# Patient Record
Sex: FEMALE | Race: WHITE | NOT HISPANIC OR LATINO | Employment: OTHER | URBAN - METROPOLITAN AREA
[De-identification: names, ages, dates, MRNs, and addresses within clinical notes are randomized per-mention and may not be internally consistent; named-entity substitution may affect disease eponyms.]

---

## 2017-03-10 ENCOUNTER — TELEPHONE (OUTPATIENT)
Dept: VASCULAR SURGERY | Facility: CLINIC | Age: 27
End: 2017-03-10

## 2017-03-10 DIAGNOSIS — I87.2 VENOUS INSUFFICIENCY: Primary | ICD-10-CM

## 2017-03-15 ENCOUNTER — HOSPITAL ENCOUNTER (EMERGENCY)
Facility: OTHER | Age: 27
Discharge: HOME | End: 2017-03-15
Attending: EMERGENCY MEDICINE
Payer: MEDICARE

## 2017-03-15 VITALS
TEMPERATURE: 99 F | SYSTOLIC BLOOD PRESSURE: 150 MMHG | HEIGHT: 63 IN | HEART RATE: 96 BPM | DIASTOLIC BLOOD PRESSURE: 60 MMHG | BODY MASS INDEX: 22.68 KG/M2 | OXYGEN SATURATION: 99 % | WEIGHT: 128 LBS | RESPIRATION RATE: 18 BRPM

## 2017-03-15 DIAGNOSIS — R10.9 CHRONIC ABDOMINAL PAIN: ICD-10-CM

## 2017-03-15 DIAGNOSIS — R10.10 PAIN OF UPPER ABDOMEN: Primary | ICD-10-CM

## 2017-03-15 DIAGNOSIS — R11.2 NON-INTRACTABLE VOMITING WITH NAUSEA, UNSPECIFIED VOMITING TYPE: ICD-10-CM

## 2017-03-15 DIAGNOSIS — G89.29 CHRONIC ABDOMINAL PAIN: ICD-10-CM

## 2017-03-15 LAB
ALBUMIN SERPL BCP-MCNC: 4.1 G/DL
ALP SERPL-CCNC: 44 U/L
ALT SERPL W/O P-5'-P-CCNC: 14 U/L
ANION GAP SERPL CALC-SCNC: 7 MMOL/L
AST SERPL-CCNC: 19 U/L
B-HCG UR QL: NEGATIVE
BASOPHILS # BLD AUTO: 0.01 K/UL
BASOPHILS NFR BLD: 0.1 %
BILIRUB SERPL-MCNC: 0.9 MG/DL
BUN SERPL-MCNC: 4 MG/DL
CALCIUM SERPL-MCNC: 9.5 MG/DL
CHLORIDE SERPL-SCNC: 106 MMOL/L
CO2 SERPL-SCNC: 26 MMOL/L
CREAT SERPL-MCNC: 0.9 MG/DL
CTP QC/QA: YES
DIFFERENTIAL METHOD: ABNORMAL
EOSINOPHIL # BLD AUTO: 0.1 K/UL
EOSINOPHIL NFR BLD: 0.8 %
ERYTHROCYTE [DISTWIDTH] IN BLOOD BY AUTOMATED COUNT: 11.6 %
EST. GFR  (AFRICAN AMERICAN): >60 ML/MIN/1.73 M^2
EST. GFR  (NON AFRICAN AMERICAN): >60 ML/MIN/1.73 M^2
GLUCOSE SERPL-MCNC: 83 MG/DL
HCT VFR BLD AUTO: 42.7 %
HGB BLD-MCNC: 14.9 G/DL
INR PPP: 1
LIPASE SERPL-CCNC: 7 U/L
LYMPHOCYTES # BLD AUTO: 2.8 K/UL
LYMPHOCYTES NFR BLD: 27.4 %
MAGNESIUM SERPL-MCNC: 2.2 MG/DL
MCH RBC QN AUTO: 31.5 PG
MCHC RBC AUTO-ENTMCNC: 34.9 %
MCV RBC AUTO: 90 FL
MONOCYTES # BLD AUTO: 0.7 K/UL
MONOCYTES NFR BLD: 6.4 %
NEUTROPHILS # BLD AUTO: 6.7 K/UL
NEUTROPHILS NFR BLD: 65.1 %
PLATELET # BLD AUTO: 232 K/UL
PMV BLD AUTO: 9.9 FL
POTASSIUM SERPL-SCNC: 4.3 MMOL/L
PROT SERPL-MCNC: 7.7 G/DL
PROTHROMBIN TIME: 10.7 SEC
RBC # BLD AUTO: 4.73 M/UL
SODIUM SERPL-SCNC: 139 MMOL/L
WBC # BLD AUTO: 10.25 K/UL

## 2017-03-15 PROCEDURE — 85025 COMPLETE CBC W/AUTO DIFF WBC: CPT

## 2017-03-15 PROCEDURE — 25000003 PHARM REV CODE 250: Performed by: EMERGENCY MEDICINE

## 2017-03-15 PROCEDURE — 96376 TX/PRO/DX INJ SAME DRUG ADON: CPT

## 2017-03-15 PROCEDURE — 80053 COMPREHEN METABOLIC PANEL: CPT

## 2017-03-15 PROCEDURE — 83690 ASSAY OF LIPASE: CPT

## 2017-03-15 PROCEDURE — 81025 URINE PREGNANCY TEST: CPT | Performed by: EMERGENCY MEDICINE

## 2017-03-15 PROCEDURE — 83735 ASSAY OF MAGNESIUM: CPT

## 2017-03-15 PROCEDURE — 96375 TX/PRO/DX INJ NEW DRUG ADDON: CPT

## 2017-03-15 PROCEDURE — 96365 THER/PROPH/DIAG IV INF INIT: CPT

## 2017-03-15 PROCEDURE — 99284 EMERGENCY DEPT VISIT MOD MDM: CPT | Mod: 25

## 2017-03-15 PROCEDURE — 63600175 PHARM REV CODE 636 W HCPCS: Performed by: EMERGENCY MEDICINE

## 2017-03-15 PROCEDURE — 96361 HYDRATE IV INFUSION ADD-ON: CPT

## 2017-03-15 PROCEDURE — 85610 PROTHROMBIN TIME: CPT

## 2017-03-15 RX ORDER — AMOXICILLIN AND CLAVULANATE POTASSIUM 875; 125 MG/1; MG/1
1 TABLET, FILM COATED ORAL EVERY 12 HOURS
Qty: 14 TABLET | Refills: 0 | Status: SHIPPED | OUTPATIENT
Start: 2017-03-15 | End: 2017-04-19 | Stop reason: ALTCHOICE

## 2017-03-15 RX ORDER — PROMETHAZINE HYDROCHLORIDE 25 MG/1
25 TABLET ORAL 3 TIMES DAILY PRN
Qty: 20 TABLET | Refills: 0 | Status: SHIPPED | OUTPATIENT
Start: 2017-03-15 | End: 2017-04-19 | Stop reason: ALTCHOICE

## 2017-03-15 RX ORDER — PROMETHAZINE HYDROCHLORIDE 25 MG/1
25 SUPPOSITORY RECTAL EVERY 4 HOURS PRN
COMMUNITY
End: 2017-03-15

## 2017-03-15 RX ORDER — AMOXICILLIN AND CLAVULANATE POTASSIUM 875; 125 MG/1; MG/1
1 TABLET, FILM COATED ORAL
Status: DISCONTINUED | OUTPATIENT
Start: 2017-03-15 | End: 2017-03-15 | Stop reason: HOSPADM

## 2017-03-15 RX ORDER — OXYCODONE AND ACETAMINOPHEN 10; 325 MG/1; MG/1
1 TABLET ORAL EVERY 6 HOURS PRN
COMMUNITY
End: 2017-04-19 | Stop reason: ALTCHOICE

## 2017-03-15 RX ORDER — METRONIDAZOLE 500 MG/1
500 TABLET ORAL EVERY 12 HOURS
COMMUNITY
End: 2017-04-19 | Stop reason: ALTCHOICE

## 2017-03-15 RX ORDER — SULFAMETHOXAZOLE AND TRIMETHOPRIM 800; 160 MG/1; MG/1
1 TABLET ORAL 2 TIMES DAILY
COMMUNITY
End: 2017-04-19 | Stop reason: ALTCHOICE

## 2017-03-15 RX ORDER — PROMETHAZINE HYDROCHLORIDE 25 MG/1
25 TABLET ORAL 3 TIMES DAILY PRN
COMMUNITY
End: 2017-03-15

## 2017-03-15 RX ORDER — MORPHINE SULFATE 2 MG/ML
4 INJECTION, SOLUTION INTRAMUSCULAR; INTRAVENOUS
Status: COMPLETED | OUTPATIENT
Start: 2017-03-15 | End: 2017-03-15

## 2017-03-15 RX ADMIN — MORPHINE SULFATE 4 MG: 2 INJECTION, SOLUTION INTRAMUSCULAR; INTRAVENOUS at 06:03

## 2017-03-15 RX ADMIN — PROMETHAZINE HYDROCHLORIDE 12.5 MG: 25 INJECTION INTRAMUSCULAR; INTRAVENOUS at 06:03

## 2017-03-15 RX ADMIN — PROMETHAZINE HYDROCHLORIDE 12.5 MG: 25 INJECTION INTRAMUSCULAR; INTRAVENOUS at 08:03

## 2017-03-15 RX ADMIN — SODIUM CHLORIDE 1000 ML: 0.9 INJECTION, SOLUTION INTRAVENOUS at 06:03

## 2017-03-15 NOTE — ED PROVIDER NOTES
"Encounter Date: 3/15/2017       History     Chief Complaint   Patient presents with    Abdominal Pain     Pt CO abd pain with diarrhea, and vomiting Xs 3 weeks.     Review of patient's allergies indicates:   Allergen Reactions    Ciprofloxacin hcl     Flagyl [metronidazole hcl]     Hydrocodone-acetaminophen      Bad migraines; dizzy, nausea    Metoclopramide      Other reaction(s): Rash, Unspecified    Peanut     Pylera [bismuth subcit [id number]] Nausea And Vomiting and Other (See Comments)     Pt reports dizziness with Pylera    Toradol [ketorolac]     Zofran [ondansetron hcl (pf)] Other (See Comments)     Pt reports migraine with Zofran    Adhesive Rash     Plastic tape    Avelox [moxifloxacin] Nausea And Vomiting    Demerol [meperidine] Nausea And Vomiting    Neurontin [gabapentin] Nausea And Vomiting    Ultram [tramadol] Nausea And Vomiting     HPI Comments: 26-year-old female with history of chronic abdominal pain, headaches, history of bowel obstruction, ovarian cyst, ulcer with H. pylori interstitial cystitis presents emergency department with complaints of right upper abdominal pain.  She reports associated nausea, vomiting and diarrhea.  She states her symptoms been hasn't for the last 3 weeks.  She complains of pain that is a 9 out of 10.  She reports subjective fever and chills.  She admits that she's been seen multiple times for these complaints since onset.  She states that she was seen at [facility] where she had urine, labs and CT obtained.  She reports that they initially saw concerns for "leaking in my intestines" so a CT was repeated one hour later with no acute findings.  She states that she was then discharged home.  She states that she returned on Sunday for the same complaints any repeat CT was obtained.  She states that she was told that she had colitis in her small intestine and was treated with Flagyl and Cipro as well as Percocet and Phenergan.  She states " "that when she went to fill the prescriptions, the pharmacist would not fill them due to ALLERGIES to Avelox and Pylera.  She states that she was told clear liquid diet and pain medications.  She states that she followed up with gastroenterologist, Dr. Hodge today.  She states that he wanted to do a scope on her however she "did not like the way he spoke to me."  She states that he was asking her "personal questions but didn't apply."  She admits that she treated with Percocet earlier today with no relief.  She admits that she has a history of cholecystectomy.    The history is provided by the patient and a parent.     Past Medical History:   Diagnosis Date    FHx: bowel obstruction     HA (headache)     Interstitial cystitis     Ovarian cyst     Ulcer      Past Surgical History:   Procedure Laterality Date    BACK SURGERY  2010    had the scar from this surgery fixed recently    CHOLECYSTECTOMY N/A 05/2015    COLON SURGERY  2012    bowel obstruction    MINI-LAPAROTOMY      NOSE SURGERY      RHINOPLASTY TIP      tracheal polyp removed       Family History   Problem Relation Age of Onset    Diabetes Mother     Stroke Mother      51 yo    Diabetes Father     Hypertension Father     GI problems Father     Hyperlipidemia Father     Diabetes Maternal Aunt     Diabetes Maternal Uncle     Diabetes Maternal Grandmother     Diabetes Maternal Grandfather     Cancer Paternal Grandmother      bladder    Colon cancer Paternal Grandfather     Breast cancer Neg Hx     Heart disease Neg Hx      Social History   Substance Use Topics    Smoking status: Never Smoker    Smokeless tobacco: Never Used    Alcohol use No     Review of Systems   Constitutional: Positive for chills and fever.   HENT: Negative for sore throat.    Respiratory: Negative for shortness of breath.    Cardiovascular: Negative for chest pain.   Gastrointestinal: Positive for abdominal pain, blood in stool, diarrhea, nausea and vomiting. "   Genitourinary: Negative for dysuria, flank pain, frequency, hematuria, urgency and vaginal bleeding.   Musculoskeletal: Negative for back pain.   Skin: Negative for rash.   Neurological: Negative for weakness.   Hematological: Does not bruise/bleed easily.       Physical Exam   Initial Vitals   BP Pulse Resp Temp SpO2   03/15/17 1647 03/15/17 1647 03/15/17 1647 03/15/17 1647 03/15/17 1647   120/95 12 18 98.5 °F (36.9 °C) 100 %     Physical Exam    Nursing note and vitals reviewed.  Constitutional: Vital signs are normal. She appears well-developed and well-nourished. She is not diaphoretic.  Non-toxic appearance. No distress.   Tearful on exam   HENT:   Head: Normocephalic and atraumatic.   Right Ear: External ear normal.   Left Ear: External ear normal.   Nose: Nose normal.   Mouth/Throat: Uvula is midline, oropharynx is clear and moist and mucous membranes are normal. Mucous membranes are not dry. No trismus in the jaw. No uvula swelling. No oropharyngeal exudate, posterior oropharyngeal edema, posterior oropharyngeal erythema or tonsillar abscesses.   Eyes: Conjunctivae, EOM and lids are normal. Pupils are equal, round, and reactive to light. No scleral icterus.   Neck: Normal range of motion and phonation normal. Neck supple.   Cardiovascular: Normal rate, regular rhythm and normal heart sounds. Exam reveals no gallop and no friction rub.    No murmur heard.  Pulmonary/Chest: Effort normal and breath sounds normal. No respiratory distress. She has no decreased breath sounds. She has no wheezes. She has no rhonchi. She has no rales.   Abdominal: Soft. Normal appearance and bowel sounds are normal. There is no tenderness. There is no rigidity, no rebound, no guarding, no CVA tenderness, no tenderness at McBurney's point and negative Serrano's sign.   Musculoskeletal: Normal range of motion.   No obvious deformities, moving all extremities, normal gait   Neurological: She is alert and oriented to person, place,  and time. She has normal strength and normal reflexes. No sensory deficit.   Skin: Skin is warm, dry and intact. No lesion and no rash noted. No erythema.   Psychiatric: She has a normal mood and affect. Her speech is normal and behavior is normal. Judgment normal. Cognition and memory are normal.         ED Course   Procedures  Labs Reviewed   CBC W/ AUTO DIFFERENTIAL - Abnormal; Notable for the following:        Result Value    MCH 31.5 (*)     All other components within normal limits   COMPREHENSIVE METABOLIC PANEL - Abnormal; Notable for the following:     BUN, Bld 4 (*)     Alkaline Phosphatase 44 (*)     Anion Gap 7 (*)     All other components within normal limits   MAGNESIUM   LIPASE   PROTIME-INR   POCT URINE PREGNANCY             Medical Decision Making:   History:   I obtained history from: someone other than patient.       <> Summary of History: mother  Old Medical Records: I decided to obtain old medical records.  Old Records Summarized: other records.  Initial Assessment:   26-year-old female with complaints consistent with acute on chronic abdominal pain with associated nausea and vomiting.  Vital signs stable, afebrile, neurovascular intact.  She is alert, healthy and nontoxic appearing.  Abdomen is nontender and soft.  Patient is tearful on exam.  No active emesis while in the emergency department.  Clinical Tests:   Lab Tests: Reviewed and Ordered  The following lab test(s) were unremarkable: UPT, CBC, CMP and Lipase  ED Management:  I do believe that this is acute on chronic pain.  I do not feel that workup is indicated as patient has had multiple recent CT exams, hospital visits as well as an evaluation by gastroenterology today. I discussed with the attending physician.  I do not feel that narcotics are indicated at this time based on patient's history and exam.  She is requesting Dilaudid by name.  He does feel that analgesic is going to be required in order to be able to discharge patient.   As the attending physician.  No acute findings on blood work.  Bleeding and pregnancy.  She was administered IV fluids, Phenergan and morphine in the emergency department.  She refused oral Augmentin in the emergency department secondary to being  nauseous. 8:36 PM patient requesting discharge.  She was discharged home with Phenergan and Augmentin and is urged to follow-up with your GI for outpatient scope.    Other:   I have discussed this case with another health care provider.       <> Summary of the Discussion: Yesika  This note was created using Dragon Medical dictation.  There may be typographical errors secondary to dictation.                Attending Attestation:     Physician Attestation Statement for NP/PA:   I have conducted a face to face encounter with this patient in addition to the NP/PA, due to Medical Complexity    Other NP/PA Attestation Additions:      Medical Decision Making:     Complex GI history, 26F with h/o recurrent upper abdominal pain and reported CT at  dx as 'small bowel infection', with persistent pain and nausea, unable to fill Cipro/Flagyl due to reported Levaquin allergy. Likely not true allergy but side effects. Per chart review she had very similar presentation last year and saw GI at [place], but she states this is different. She needs EGD and colonoscopy, and saw GI today but 'did not like his bedside manner'.  Labs checked and normal, no indication for repeat CT. Pain controlled and no emesis in ED. Given reported bowel infection and allergy, advised change Abx to Augmentin. Pt did not want to take dose in ED on empty stomach, will start it tomorrow and f/u with another GI, as scopes are needed for definitive dx of any inflammatory bowel dz.                 ED Course     Clinical Impression:     1. Pain of upper abdomen    2. Chronic abdominal pain    3. Non-intractable vomiting with nausea, unspecified vomiting type          Disposition:   Disposition:  Discharged  Condition: Stable       Nai Drummond PA-C  03/15/17 203       Justin Napoles MD  03/16/17 5804

## 2017-03-15 NOTE — ED AVS SNAPSHOT
[facility]  [address]  [facility] [address]               Kendra Russell   3/15/2017  5:47 PM   ED    Description:  Female : 1990   Department:  [facility]           Your Care was Coordinated By:     Provider Role From To    Justin Napoles MD Attending Provider 03/15/17 974 --    Nai Drummond PA-C Physician Assistant 03/15/17 1749 --      Reason for Visit     Abdominal Pain           Diagnoses this Visit        Comments    Pain of upper abdomen    -  Primary       ED Disposition     None           To Do List           Follow-up Information     Follow up with Roldan De Jesus MD. Schedule an appointment as soon as possible for a visit in 3 days.    Specialty:  Gastroenterology    Contact information:    [address] [facility] [address]  [place] [id number] [phone]         These Medications        Disp Refills Start End    promethazine (PHENERGAN) 25 MG tablet 20 tablet 0 3/15/2017     Take 1 tablet (25 mg total) by mouth 3 (three) times daily as needed for Nausea. - Oral    Pharmacy: [facility] [address] [place] [address] [address] [facility] Ph #: [phone]       amoxicillin-clavulanate 875-125mg (AUGMENTIN) 875-125 mg per tablet 14 tablet 0 3/15/2017     Take 1 tablet by mouth every 12 (twelve) hours. - Oral    Pharmacy: [facility] [address] [place] [address] [address] AT [facility] Ph #: [phone]         [facility]     [facility] Care [facility] -  Assistance  Registered nurses in the [facility] provide clinical advisement, health education, appointment booking, and other advisory services.  Call for this free service at [phone].             Medications           Message regarding Medications     Verify the changes and/or additions to your medication regime listed below are the same as discussed with your clinician  today.  If any of these changes or additions are incorrect, please notify your healthcare provider.        START taking these NEW medications        Refills    promethazine (PHENERGAN) 25 MG tablet 0    Sig: Take 1 tablet (25 mg total) by mouth 3 (three) times daily as needed for Nausea.    Class: Print    Route: Oral    amoxicillin-clavulanate 875-125mg (AUGMENTIN) 875-125 mg per tablet 0    Sig: Take 1 tablet by mouth every 12 (twelve) hours.    Class: Print    Route: Oral      These medications were administered today        Dose Freq    sodium chloride 0.9% bolus 1,000 mL 1,000 mL ED 1 Time    Sig: Inject 1,000 mLs into the vein ED 1 Time.    Class: Normal    Route: Intravenous    morphine injection 4 mg 4 mg ED 1 Time    Sig: Inject 2 mLs (4 mg total) into the vein ED 1 Time.    Class: Normal    Route: Intravenous    promethazine (PHENERGAN) 12.5 mg in dextrose 5 % 50 mL IVPB 12.5 mg ED 1 Time    Sig: Inject 12.5 mg into the vein ED 1 Time.    Class: Normal    Route: Intravenous    promethazine (PHENERGAN) 12.5 mg in dextrose 5 % 50 mL IVPB 12.5 mg ED 1 Time    Sig: Inject 12.5 mg into the vein ED 1 Time.    Class: Normal    Route: Intravenous    amoxicillin-clavulanate 875-125mg per tablet 1 tablet 1 tablet ED 1 Time    Sig: Take 1 tablet by mouth ED 1 Time.    Class: Normal    Route: Oral      STOP taking these medications     oxycodone-acetaminophen (PERCOCET) 5-325 mg per tablet Take 1 tablet by mouth every 6 (six) hours as needed for Pain (severe pain).    promethazine (PHENERGAN) 25 MG suppository Place 25 mg rectally every 4 (four) hours as needed for Nausea.           Verify that the below list of medications is an accurate representation of the medications you are currently taking.  If none reported, the list may be blank. If incorrect, please contact your healthcare provider. Carry this list with you in case of emergency.           Current Medications     amoxicillin-clavulanate 875-125mg (AUGMENTIN)  "875-125 mg per tablet Take 1 tablet by mouth every 12 (twelve) hours.    amoxicillin-clavulanate 875-125mg per tablet 1 tablet Take 1 tablet by mouth ED 1 Time.    docusate sodium (COLACE) 100 MG capsule Take 100 mg by mouth.    etonogestrel-ethinyl estradiol (NUVARING) 0.12-0.015 mg/24 hr vaginal ring Place vaginally.    metronidazole (FLAGYL) 500 MG tablet Take 500 mg by mouth every 12 (twelve) hours.    oxycodone-acetaminophen (PERCOCET)  mg per tablet Take 1 tablet by mouth every 6 (six) hours as needed for Pain.    promethazine (PHENERGAN) 12.5 mg in dextrose 5 % 50 mL IVPB Inject 12.5 mg into the vein ED 1 Time.    promethazine (PHENERGAN) 25 MG tablet Take 1 tablet (25 mg total) by mouth 3 (three) times daily as needed for Nausea.    sulfamethoxazole-trimethoprim 800-160mg (BACTRIM DS) 800-160 mg Tab Take 1 tablet by mouth 2 (two) times daily.           Clinical Reference Information           Your Vitals Were     BP Pulse Temp Resp Height Weight    120/95 (BP Location: Left arm, Patient Position: Sitting) 12 98.5 °F (36.9 °C) (Oral) 18 5' 3" (1.6 m) 58.1 kg (128 lb)    SpO2 BMI             100% 22.67 kg/m2         Allergies as of 3/15/2017        Reactions    Ciprofloxacin Hcl     Flagyl [Metronidazole Hcl]     Hydrocodone-acetaminophen     Bad migraines; dizzy, nausea    Metoclopramide     Other reaction(s): Rash, Unspecified    Peanut     Pylera [Bismuth Subcit [id number]] Nausea And Vomiting, Other (See Comments)    Pt reports dizziness with Pylera    Toradol [Ketorolac]     Zofran [Ondansetron Hcl (Pf)] Other (See Comments)    Pt reports migraine with Zofran    Adhesive Rash    Plastic tape    Avelox [Moxifloxacin] Nausea And Vomiting    Demerol [Meperidine] Nausea And Vomiting    Neurontin [Gabapentin] Nausea And Vomiting    Ultram [Tramadol] Nausea And Vomiting      Immunizations Administered on Date of Encounter - 3/15/2017     None      ED Micro, Lab, POCT     Start Ordered       Status " Ordering Provider    03/15/17 1822 03/15/17 1822  CBC auto differential  STAT      Final result     03/15/17 1822 03/15/17 1822  Comprehensive metabolic panel  STAT      Final result     03/15/17 1822 03/15/17 1822  Magnesium  STAT      Final result     03/15/17 1822 03/15/17 1822  Lipase  STAT      Final result     03/15/17 1822 03/15/17 1822  Protime-INR  STAT      Final result     03/15/17 1650 03/15/17 1649  POCT urine pregnancy  Once      Final result       ED Imaging Orders     None        Discharge Instructions         Abdominal Pain    Abdominal pain is pain in the stomach or belly area. Everyone has this pain from time to time. In many cases it goes away on its own. But abdominal pain can sometimes be due to a serious problem, such as appendicitis. So its important to know when to seek help.  Causes of abdominal pain  There are many possible causes of abdominal pain. Common causes in adults include:  · Constipation, diarrhea, or gas  · Stomach acid flowing back up into the esophagus (acid reflux or heartburn)  · Severe acid reflux, called GERD (gastroesophageal reflux disease)  · A sore in the lining of the stomach or small intestine (peptic ulcer)  · Inflammation of the gallbladder, liver, or pancreas  · Gallstones or kidney stones  · Appendicitis   · Intestinal blockage   · An internal organ pushing through a muscle or other tissue (hernia)  · Urinary tract infections  · In women, menstrual cramps, fibroids, or endometriosis  · Inflammation or infection of the intestines  Diagnosing the cause of abdominal pain  Your healthcare provider will do a physical exam help find the cause of your pain. If needed, tests will be ordered. Belly pain has many possible causes. So it can be hard to find the reason for your pain. Giving details about your pain can help. Tell your provider where and when you feel the pain, and what makes it better or worse. Also let your provider know if you have other symptoms such  as:  · Fever  · Tiredness  · Upset stomach (nausea)  · Vomiting  · Changes in bathroom habits  Treating abdominal pain  Some causes of pain need emergency medical treatment right away. These include appendicitis or a bowel blockage. Other problems can be treated with rest, fluids, or medicines. Your healthcare provider can give you specific instructions for treatment or self-care based on what is causing your pain.  If you have vomiting or diarrhea, sip water or other clear fluids. When you are ready to eat solid foods again, start with small amounts of easy-to-digest, low-fat foods. These include apple sauce, toast, or crackers.   When to seek medical care  Call 911 or go to the hospital right away if you:  · Cant pass stool and are vomiting  · Are vomiting blood or have bloody diarrhea or black, tarry diarrhea  · Have chest, neck, or shoulder pain  · Feel like you might pass out  · Have pain in your shoulder blades with nausea  · Have sudden, severe belly pain  · Have new, severe pain unlike any you have felt before  · Have a belly that is rigid, hard, and tender to touch  Call your healthcare provider if you have:  · Pain for more than 5 days  · Bloating for more than 2 days  · Diarrhea for more than 5 days  · A fever of 100.4°F (38.0°C) or higher, or as directed by your provider  · Pain that gets worse  · Weight loss for no reason  · Continued lack of appetite  · Blood in your stool  How to prevent abdominal pain  Here are some tips to help prevent abdominal pain:  · Eat smaller amounts of food at one time.  · Avoid greasy, fried, or other high-fat foods.  · Avoid foods that give you gas.  · Exercise regularly.  · Drink plenty of fluids.  To help prevent GERD symptoms:  · Quit smoking.  · Reduce alcohol and certain foods that increase stomach acid.  · Avoid aspirin and over-the-counter pain and fever medicines (NSAIDS or nonsteroidal anti-inflammatory drugs), if possible  · Lose extra weight.  · Finish eating  at least 2 hours before you go to bed or lie down.  · Raise the head of your bed.  Date Last Reviewed: 7/1/2016  © 4212-6058 [employer]. [address] [place] [address], [place] [address]. All rights reserved. This information is not intended as a substitute for professional medical care. Always follow your healthcare professional's instructions.          [facility] Sign-Up     Activating your [facility] account is as easy as 1-2-3!     1) Visit [email or web address], select Sign Up Now, enter this activation code and your date of birth, then select Next.  [email or web address]  Expires: 4/29/2017  8:33 PM      2) Create a username and password to use when you visit [facility] in the future and select a security question in case you lose your password and select Next.    3) Enter your e-mail address and click Sign Up!    Additional Information  If you have questions, please e-mail [email or web address] or call [phone] to talk to our [facility] staff. Remember, [facility] is NOT to be used for urgent needs. For medical emergencies, dial 911.          [facility] complies with applicable Federal civil rights laws and does not discriminate on the basis of race, color, national origin, age, disability, or sex.        Language Assistance Services     ATTENTION: Language assistance services are available, free of charge. Please call [phone].      ATENCIÓN: Si habla español, tiene a peter disposición servicios gratuitos de asistencia lingüística. Llame al [phone].     CHÚ Ý: N?u b?n nói Ti?ng Vi?t, có các d?ch v? h? tr? ngôn ng? mi?n phí dành cho b?n. G?i s? [phone].

## 2017-03-15 NOTE — ED NOTES
"Over the past 3 weeks pt reports n/v/d. "severe cramping pain" to epigastric region. Was seen at [facility] 3 weeks ago. CT scan was performed and revealed suspected perforation. Repeat scan done 1 hour later and did not show a perf. Pt went back to [facility] with same symptoms and was dx with UTI and small intestine infection. Given Cipro and Flagyl, but cannot take it because she is allergic. Pt states she has been throwing up bile and has bloody stools. NAD. Pt is crying and anxious. Will cont to monitor.  "

## 2017-03-16 NOTE — ED NOTES
Pt reports that she does not want to stay and finish her IV medication and she just wants to go home. Pt has been drinking juice and has eaten cracker, no emesis since arrival but does report she still has nausea but refuses to stay to receive all medication. Pt given d/c instructions to include medications and follow up care and verbalized understanding. Pt ambulated with steady gait to d/c window and d/c in stable condition.

## 2017-03-16 NOTE — DISCHARGE INSTRUCTIONS
Abdominal Pain    Abdominal pain is pain in the stomach or belly area. Everyone has this pain from time to time. In many cases it goes away on its own. But abdominal pain can sometimes be due to a serious problem, such as appendicitis. So its important to know when to seek help.  Causes of abdominal pain  There are many possible causes of abdominal pain. Common causes in adults include:  · Constipation, diarrhea, or gas  · Stomach acid flowing back up into the esophagus (acid reflux or heartburn)  · Severe acid reflux, called GERD (gastroesophageal reflux disease)  · A sore in the lining of the stomach or small intestine (peptic ulcer)  · Inflammation of the gallbladder, liver, or pancreas  · Gallstones or kidney stones  · Appendicitis   · Intestinal blockage   · An internal organ pushing through a muscle or other tissue (hernia)  · Urinary tract infections  · In women, menstrual cramps, fibroids, or endometriosis  · Inflammation or infection of the intestines  Diagnosing the cause of abdominal pain  Your healthcare provider will do a physical exam help find the cause of your pain. If needed, tests will be ordered. Belly pain has many possible causes. So it can be hard to find the reason for your pain. Giving details about your pain can help. Tell your provider where and when you feel the pain, and what makes it better or worse. Also let your provider know if you have other symptoms such as:  · Fever  · Tiredness  · Upset stomach (nausea)  · Vomiting  · Changes in bathroom habits  Treating abdominal pain  Some causes of pain need emergency medical treatment right away. These include appendicitis or a bowel blockage. Other problems can be treated with rest, fluids, or medicines. Your healthcare provider can give you specific instructions for treatment or self-care based on what is causing your pain.  If you have vomiting or diarrhea, sip water or other clear fluids. When you are ready to eat solid foods again,  start with small amounts of easy-to-digest, low-fat foods. These include apple sauce, toast, or crackers.   When to seek medical care  Call 911 or go to the hospital right away if you:  · Cant pass stool and are vomiting  · Are vomiting blood or have bloody diarrhea or black, tarry diarrhea  · Have chest, neck, or shoulder pain  · Feel like you might pass out  · Have pain in your shoulder blades with nausea  · Have sudden, severe belly pain  · Have new, severe pain unlike any you have felt before  · Have a belly that is rigid, hard, and tender to touch  Call your healthcare provider if you have:  · Pain for more than 5 days  · Bloating for more than 2 days  · Diarrhea for more than 5 days  · A fever of 100.4°F (38.0°C) or higher, or as directed by your provider  · Pain that gets worse  · Weight loss for no reason  · Continued lack of appetite  · Blood in your stool  How to prevent abdominal pain  Here are some tips to help prevent abdominal pain:  · Eat smaller amounts of food at one time.  · Avoid greasy, fried, or other high-fat foods.  · Avoid foods that give you gas.  · Exercise regularly.  · Drink plenty of fluids.  To help prevent GERD symptoms:  · Quit smoking.  · Reduce alcohol and certain foods that increase stomach acid.  · Avoid aspirin and over-the-counter pain and fever medicines (NSAIDS or nonsteroidal anti-inflammatory drugs), if possible  · Lose extra weight.  · Finish eating at least 2 hours before you go to bed or lie down.  · Raise the head of your bed.  Date Last Reviewed: 7/1/2016  © 0758-0474 [employer]. [address] [place] [address], [place] [address]. All rights reserved. This information is not intended as a substitute for professional medical care. Always follow your healthcare professional's instructions.

## 2017-04-19 ENCOUNTER — OFFICE VISIT (OUTPATIENT)
Dept: VASCULAR SURGERY | Facility: CLINIC | Age: 27
End: 2017-04-19
Payer: MEDICARE

## 2017-04-19 ENCOUNTER — HOSPITAL ENCOUNTER (OUTPATIENT)
Dept: VASCULAR SURGERY | Facility: CLINIC | Age: 27
Discharge: HOME | End: 2017-04-19
Attending: SURGERY
Payer: MEDICARE

## 2017-04-19 VITALS
HEIGHT: 63 IN | DIASTOLIC BLOOD PRESSURE: 72 MMHG | SYSTOLIC BLOOD PRESSURE: 105 MMHG | BODY MASS INDEX: 22.5 KG/M2 | TEMPERATURE: 99 F | WEIGHT: 127 LBS | HEART RATE: 91 BPM

## 2017-04-19 DIAGNOSIS — I87.2 VENOUS INSUFFICIENCY: ICD-10-CM

## 2017-04-19 DIAGNOSIS — I83.93 SPIDER VEINS OF BOTH LOWER EXTREMITIES: Primary | ICD-10-CM

## 2017-04-19 PROCEDURE — 99999 PR PBB SHADOW E&M-EST. PATIENT-LVL III: CPT | Mod: PBBFAC,,, | Performed by: SURGERY

## 2017-04-19 PROCEDURE — 99213 OFFICE O/P EST LOW 20 MIN: CPT | Mod: PBBFAC | Performed by: SURGERY

## 2017-04-19 PROCEDURE — 99203 OFFICE O/P NEW LOW 30 MIN: CPT | Mod: S$PBB,,, | Performed by: SURGERY

## 2017-04-19 PROCEDURE — 93970 EXTREMITY STUDY: CPT | Mod: 26,S$PBB,, | Performed by: SURGERY

## 2017-04-19 NOTE — PROGRESS NOTES
"Patient ID: Kendra Russell is a 26 y.o. female.    I. HISTORY     Chief Complaint: Rule out venous insufficiency, and has spider veins    HPI Pleasant 26 year old woman with "spider veins", presents for evaluation.    She has had these for 10 years, and these are slowly worsening.  They have become much worse over the last year and are now painful.      She went to a cosmetic vein center, where a study had revealed venous insufficiency, so she was instructed to seek treatment for this.  A second doctor ordered an US, which showed the same.  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    She has never had any leg swelling, ulcers, or skin changes.    Review of Systems   Constitution: Negative for chills and diaphoresis.   HENT: Negative for congestion and ear discharge.    Eyes: Negative for pain and photophobia.   Cardiovascular: Negative for chest pain and claudication.   Respiratory: Negative for cough, shortness of breath, sleep disturbances due to breathing and snoring.    Endocrine: Negative for heat intolerance and polydipsia.       II. PHYSICAL EXAM     Physical Exam   Constitutional: She is oriented to person, place, and time. She appears well-developed and well-nourished.   HENT:   Head: Normocephalic and atraumatic.   Eyes: Pupils are equal, round, and reactive to light.   Neck: Normal range of motion. Neck supple.   Cardiovascular: Normal rate and regular rhythm.    Pulmonary/Chest: Effort normal and breath sounds normal.   Abdominal: Soft. Bowel sounds are normal. She exhibits no distension. There is no tenderness.   Neurological: She is alert and oriented to person, place, and time.   Skin: Skin is warm and dry.        Minimal spider veins of the hips and upper thigh.  No edema, ulcers, or skin changes of the bilateral legs.  2+ DP bilaterally    III. ASSESSMENT & PLAN (MEDICAL DECISION MAKING)         Imaging Results:  4/19/17 US venous, " bilateral lower extremity: No thrombus, and no signs of reflux    Assessment/Diagnosis and Plan:    26 year old woman with spider veins, no venous insufficiency    OK to go to a cosmetic center for superificial venous sclerotherapy if she desires.    No evidence of venous insufficiency, nor thrombus on today's studies.  No clinical suspicion for these either at this time.    Hernandez Simmons MD  General Surgery, PGY-3  [phone]        Vascular Surgery Staff  I have seen and examined the patient and reviewed the residents note. I agree with their assessment and plan.    No evidence of reflux on our study nor on the exam by Dr Menendez. Sclerotherpay as desired fro spider veins.  See me back as needed    Kelly Calvert MD FACS [facility]  Vascular & Endovascular Surgery

## 2017-10-10 ENCOUNTER — HOSPITAL ENCOUNTER (EMERGENCY)
Facility: HOSPITAL | Age: 27
Discharge: HOME | End: 2017-10-10
Attending: EMERGENCY MEDICINE
Payer: MEDICARE

## 2017-10-10 VITALS
BODY MASS INDEX: 23.74 KG/M2 | DIASTOLIC BLOOD PRESSURE: 71 MMHG | OXYGEN SATURATION: 100 % | TEMPERATURE: 99 F | HEIGHT: 63 IN | WEIGHT: 134 LBS | SYSTOLIC BLOOD PRESSURE: 119 MMHG | RESPIRATION RATE: 16 BRPM | HEART RATE: 88 BPM

## 2017-10-10 DIAGNOSIS — R10.9 LEFT SIDED ABDOMINAL PAIN: Primary | ICD-10-CM

## 2017-10-10 DIAGNOSIS — R11.2 NAUSEA, VOMITING, AND DIARRHEA: ICD-10-CM

## 2017-10-10 DIAGNOSIS — R19.7 NAUSEA, VOMITING, AND DIARRHEA: ICD-10-CM

## 2017-10-10 LAB
ALBUMIN SERPL BCP-MCNC: 4.7 G/DL
ALP SERPL-CCNC: 84 U/L
ALT SERPL W/O P-5'-P-CCNC: 14 U/L
AMYLASE SERPL-CCNC: 132 U/L
ANION GAP SERPL CALC-SCNC: 12 MMOL/L
AST SERPL-CCNC: 19 U/L
B-HCG UR QL: NEGATIVE
BACTERIA #/AREA URNS HPF: NORMAL /HPF
BASOPHILS # BLD AUTO: 0.02 K/UL
BASOPHILS NFR BLD: 0.2 %
BILIRUB SERPL-MCNC: 1.1 MG/DL
BILIRUB UR QL STRIP: NEGATIVE
BUN SERPL-MCNC: 10 MG/DL
CALCIUM SERPL-MCNC: 9.9 MG/DL
CHLORIDE SERPL-SCNC: 106 MMOL/L
CLARITY UR: CLEAR
CO2 SERPL-SCNC: 21 MMOL/L
COLOR UR: YELLOW
CREAT SERPL-MCNC: 0.8 MG/DL
CTP QC/QA: YES
DIFFERENTIAL METHOD: ABNORMAL
EOSINOPHIL # BLD AUTO: 0 K/UL
EOSINOPHIL NFR BLD: 0.3 %
ERYTHROCYTE [DISTWIDTH] IN BLOOD BY AUTOMATED COUNT: 11.9 %
EST. GFR  (AFRICAN AMERICAN): >60 ML/MIN/1.73 M^2
EST. GFR  (NON AFRICAN AMERICAN): >60 ML/MIN/1.73 M^2
GLUCOSE SERPL-MCNC: 86 MG/DL
GLUCOSE UR QL STRIP: NEGATIVE
HCT VFR BLD AUTO: 40.7 %
HGB BLD-MCNC: 14.4 G/DL
HGB UR QL STRIP: NEGATIVE
KETONES UR QL STRIP: NEGATIVE
LEUKOCYTE ESTERASE UR QL STRIP: ABNORMAL
LIPASE SERPL-CCNC: 25 U/L
LYMPHOCYTES # BLD AUTO: 2.2 K/UL
LYMPHOCYTES NFR BLD: 21.8 %
MCH RBC QN AUTO: 31.9 PG
MCHC RBC AUTO-ENTMCNC: 35.4 G/DL
MCV RBC AUTO: 90 FL
MICROSCOPIC COMMENT: NORMAL
MONOCYTES # BLD AUTO: 0.8 K/UL
MONOCYTES NFR BLD: 7.9 %
NEUTROPHILS # BLD AUTO: 7 K/UL
NEUTROPHILS NFR BLD: 69.6 %
NITRITE UR QL STRIP: NEGATIVE
PH UR STRIP: 8 [PH] (ref 5–8)
PLATELET # BLD AUTO: 229 K/UL
PMV BLD AUTO: 9.9 FL
POTASSIUM SERPL-SCNC: 3.6 MMOL/L
PROT SERPL-MCNC: 8.7 G/DL
PROT UR QL STRIP: NEGATIVE
RBC # BLD AUTO: 4.51 M/UL
RBC #/AREA URNS HPF: 0 /HPF (ref 0–4)
SODIUM SERPL-SCNC: 139 MMOL/L
SP GR UR STRIP: 1.01 (ref 1–1.03)
SQUAMOUS #/AREA URNS HPF: 3 /HPF
URN SPEC COLLECT METH UR: ABNORMAL
UROBILINOGEN UR STRIP-ACNC: NEGATIVE EU/DL
WBC # BLD AUTO: 10.02 K/UL
WBC #/AREA URNS HPF: 1 /HPF (ref 0–5)
WBC CLUMPS URNS QL MICRO: NORMAL
YEAST URNS QL MICRO: NORMAL

## 2017-10-10 PROCEDURE — 96372 THER/PROPH/DIAG INJ SC/IM: CPT

## 2017-10-10 PROCEDURE — 25000003 PHARM REV CODE 250: Performed by: EMERGENCY MEDICINE

## 2017-10-10 PROCEDURE — 99283 EMERGENCY DEPT VISIT LOW MDM: CPT | Mod: 25

## 2017-10-10 PROCEDURE — 85025 COMPLETE CBC W/AUTO DIFF WBC: CPT

## 2017-10-10 PROCEDURE — 83690 ASSAY OF LIPASE: CPT

## 2017-10-10 PROCEDURE — 82150 ASSAY OF AMYLASE: CPT

## 2017-10-10 PROCEDURE — 80053 COMPREHEN METABOLIC PANEL: CPT

## 2017-10-10 PROCEDURE — 81000 URINALYSIS NONAUTO W/SCOPE: CPT

## 2017-10-10 PROCEDURE — 63600175 PHARM REV CODE 636 W HCPCS: Performed by: EMERGENCY MEDICINE

## 2017-10-10 PROCEDURE — 81025 URINE PREGNANCY TEST: CPT | Performed by: EMERGENCY MEDICINE

## 2017-10-10 PROCEDURE — 96360 HYDRATION IV INFUSION INIT: CPT

## 2017-10-10 RX ORDER — DICYCLOMINE HYDROCHLORIDE 10 MG/ML
20 INJECTION INTRAMUSCULAR
Status: COMPLETED | OUTPATIENT
Start: 2017-10-10 | End: 2017-10-10

## 2017-10-10 RX ORDER — OXYCODONE AND ACETAMINOPHEN 5; 325 MG/1; MG/1
1 TABLET ORAL EVERY 4 HOURS PRN
COMMUNITY
End: 2019-01-30

## 2017-10-10 RX ORDER — PROCHLORPERAZINE EDISYLATE 5 MG/ML
10 INJECTION INTRAMUSCULAR; INTRAVENOUS
Status: DISCONTINUED | OUTPATIENT
Start: 2017-10-10 | End: 2017-10-11 | Stop reason: HOSPADM

## 2017-10-10 RX ADMIN — SODIUM CHLORIDE 1000 ML: 0.9 INJECTION, SOLUTION INTRAVENOUS at 09:10

## 2017-10-10 RX ADMIN — DICYCLOMINE HYDROCHLORIDE 20 MG: 20 INJECTION, SOLUTION INTRAMUSCULAR at 09:10

## 2017-10-11 NOTE — DISCHARGE INSTRUCTIONS
Take phenergan for nausea.  Take immodium for cramping and diarrhea.  Take percocet for pain.  Eat a bland diet.  See your doctor if you are not improving in 2-3 days.

## 2017-10-11 NOTE — ED NOTES
Pt states that she has had diarrhea and abdominal pain since yesterday and started vomiting today. Pt states that she is having 8/10 pain in her abdomen, tender to touch. rr even and unlabored on ra. Will continue to monitor.

## 2018-10-02 ENCOUNTER — OFFICE VISIT (OUTPATIENT)
Dept: PHYSICAL MEDICINE AND REHAB | Facility: CLINIC | Age: 28
End: 2018-10-02
Payer: MEDICARE

## 2018-10-02 VITALS
HEART RATE: 104 BPM | SYSTOLIC BLOOD PRESSURE: 115 MMHG | WEIGHT: 145 LBS | DIASTOLIC BLOOD PRESSURE: 84 MMHG | BODY MASS INDEX: 25.69 KG/M2 | HEIGHT: 63 IN

## 2018-10-02 DIAGNOSIS — M54.50 CHRONIC BILATERAL LOW BACK PAIN WITHOUT SCIATICA: ICD-10-CM

## 2018-10-02 DIAGNOSIS — G89.29 CHRONIC BILATERAL LOW BACK PAIN WITHOUT SCIATICA: ICD-10-CM

## 2018-10-02 PROCEDURE — 99499 UNLISTED E&M SERVICE: CPT | Mod: S$PBB,,, | Performed by: PHYSICAL MEDICINE & REHABILITATION

## 2018-10-02 PROCEDURE — 99213 OFFICE O/P EST LOW 20 MIN: CPT | Mod: PBBFAC | Performed by: PHYSICAL MEDICINE & REHABILITATION

## 2018-10-02 PROCEDURE — 99999 PR PBB SHADOW E&M-EST. PATIENT-LVL III: CPT | Mod: PBBFAC,,, | Performed by: PHYSICAL MEDICINE & REHABILITATION

## 2018-10-02 NOTE — PROGRESS NOTES
"Kendra Bianchi, is 27 y/o female who came first time to clinic for back pain.     She is self referred.  CC: back pain.   HPI  States that her family member, that is pt of mine, who referred her.  States that she has a h/o interstitial cystitis, for which she had bladder stimulator, inserted in 2010-11, that was removed a month later.  She had residual scaring requiring 3 plastic surgeries. She also states that she was told that she has " permanent nerve damage".   She has no PCP, states that dr. Melo is not her PCP, has Urologist, GI doctor, Ob/GYN, ID and pain specialist outside of [facility].   She has been treated for chronic back pain with Percocet, cannot tolerate any other pain medications, she tried them all.   Her pain doctor is Lynn Mcwilliams, and she states that he had all her tests for LBP done, including all imaging, offered her procedures that she is still undecided.   She brings no medical documentation with her.  When asked about the reason, why she wants to transfer care from dr. Bailey, she became very offensive.   When I explained her that is requirement for chronic pain management that all pt bring previous history,and medical documentation, and d/c letter from their   Previous doctor, and use word" baggage".   She became angry and very offensive, and told me that "I am judging her".   Per , Percocet 10/325 mg #120 tabs refills on 8/27, 7/12, 5/21, 4/26.  And she states that she was not d/c'd from his clinic, she just wanted to see how this clinic visit will go.   We have a prolong discussion after that, and I explained her my approach to conservative treatment of low back pain, that include primarily strengthening and stretching of back.   We had a fluent and "normal " conversation, when she states that pt with IC, have a high suicidal rate ~ 15%, that I answered back, that it is chronic disease as every one ( including stroke, Cancer, MS, SCI, or ulcerative colitis) and if you have a  Good " "family support you should not have those thoughts.   She told me back that I do not know much about " interstitial cystitis"      She has an appointment with her pain doctor, Dr. Bailey,  tomorrow, and I suggest that is the her best interest that she keeps that appointment, that I will not charge her for this appointment  ( time spent > 30 minutes)  She agreed and left the clinic.         "

## 2019-01-03 ENCOUNTER — OFFICE VISIT (OUTPATIENT)
Dept: NEUROLOGY | Facility: CLINIC | Age: 29
End: 2019-01-03
Payer: MEDICARE

## 2019-01-03 VITALS
BODY MASS INDEX: 25.2 KG/M2 | DIASTOLIC BLOOD PRESSURE: 89 MMHG | SYSTOLIC BLOOD PRESSURE: 127 MMHG | HEART RATE: 105 BPM | HEIGHT: 63 IN | WEIGHT: 142.19 LBS

## 2019-01-03 DIAGNOSIS — G50.0 TRIGEMINAL NEURALGIA OF LEFT SIDE OF FACE: Primary | ICD-10-CM

## 2019-01-03 PROCEDURE — 99999 PR PBB SHADOW E&M-EST. PATIENT-LVL III: CPT | Mod: PBBFAC,,, | Performed by: PSYCHIATRY & NEUROLOGY

## 2019-01-03 PROCEDURE — 99204 OFFICE O/P NEW MOD 45 MIN: CPT | Mod: S$PBB,,, | Performed by: PSYCHIATRY & NEUROLOGY

## 2019-01-03 PROCEDURE — 99213 OFFICE O/P EST LOW 20 MIN: CPT | Mod: PBBFAC,PO | Performed by: PSYCHIATRY & NEUROLOGY

## 2019-01-03 PROCEDURE — 99999 PR PBB SHADOW E&M-EST. PATIENT-LVL III: ICD-10-PCS | Mod: PBBFAC,,, | Performed by: PSYCHIATRY & NEUROLOGY

## 2019-01-03 PROCEDURE — 99204 PR OFFICE/OUTPT VISIT, NEW, LEVL IV, 45-59 MIN: ICD-10-PCS | Mod: S$PBB,,, | Performed by: PSYCHIATRY & NEUROLOGY

## 2019-01-03 NOTE — PATIENT INSTRUCTIONS
1. Please follow-up with MRI Brain  2. Please follow-up with oromaxillary surgeon.  3. Please notify me if symptoms worsen or if you would like to try medication for the facial pain.

## 2019-01-07 NOTE — PROGRESS NOTES
Neurology Clinic Visit  Primary Care Provider: John Melo  Referring Provider: damaso Curtis  Date of Visit: 01/03/2019  Reason for referral - facial pain     chief complaint:   Chief Complaint   Patient presents with    Facial Pain     History of present illness  Kendra Russell is a 28 y.o. right handed female I have been asked to consult for evaluation and treatment of possible left facial pain. The patient is accompanied by mother who assist with history. Patient reports about six months ago she was having a dental procedure and the dentist hit a nerve when he was numbing her and she immediately felt pain on left side of her face. Since then, she has had swelling and pain on the left side of her face. She reports it as intermittent stabbing pain for a few seconds on left jaw line, left eye, left temporal aspect of head, and left frontal aspect of head. She also reports intermittent fever and that her face on left sided is still swollen. She had a septoplasty a 2 weeks ago. 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 No pain on chewing or swallowing. Mother reports tried amitryplline in the past and she was allergic to it. Patient also report she is allergic to gabapentin.      Patient Active Problem List    Diagnosis Date Noted    Chronic bilateral low back pain without sciatica 10/02/2018    Venous insufficiency     Right upper quadrant abdominal pain of unknown etiology 09/06/2016    Postoperative right upper quadrant abdominal pain 08/31/2016    Altered bowel habits 08/31/2016    Nausea alone 08/31/2016    Vomiting, persistent, in adult 10/09/2014    Abdominal pain 10/09/2014    Abnormal Pap smear of cervix 10/09/2014     Class: Chronic    Chronic pain 10/09/2014     Class: Chronic    HA (headache) 10/09/2014     Class: Chronic    Interstitial cystitis 10/09/2014     Class: Chronic     Past Medical History:   Diagnosis Date    FHx: bowel obstruction     HA (headache)      Interstitial cystitis     Ovarian cyst     Ulcer      Past Surgical History:   Procedure Laterality Date    BACK SURGERY  2010    had the scar from this surgery fixed recently    CHOLECYSTECTOMY N/A 05/2015    COLON SURGERY  2012    bowel obstruction    ESOPHAGOGASTRODUODENOSCOPY (EGD) N/A 10/9/2014    [facility]    MINI-LAPAROTOMY      NOSE SURGERY      RHINOPLASTY TIP      tracheal polyp removed      ULTRASOUND-ENDOSCOPIC-UPPER N/A 9/6/2016    [facility]    UPPER GASTROINTESTINAL ENDOSCOPY  09/2017     Family History   Problem Relation Age of Onset    Diabetes Mother     Stroke Mother         53 yo    Diabetes Father     Hypertension Father     GI problems Father     Hyperlipidemia Father     Diabetes Maternal Aunt     Diabetes Maternal Uncle     Diabetes Maternal Grandmother     Diabetes Maternal Grandfather     Cancer Paternal Grandmother         bladder    Colon cancer Paternal Grandfather     Breast cancer Neg Hx     Heart disease Neg Hx          Current Outpatient Medications   Medication Sig    oxycodone-acetaminophen (PERCOCET) 5-325 mg per tablet Take 1 tablet by mouth every 4 (four) hours as needed for Pain.     No current facility-administered medications for this visit.          Review of patient's allergies indicates:   Allergen Reactions    Ciprofloxacin hcl     Flagyl [metronidazole hcl]     Hydrocodone-acetaminophen      Bad migraines; dizzy, nausea    Metoclopramide      Other reaction(s): Rash, Unspecified    Peanut     Pylera [bismuth subcit [id number]] Nausea And Vomiting and Other (See Comments)     Pt reports dizziness with Pylera    Toradol [ketorolac]     Zofran [ondansetron hcl (pf)] Other (See Comments)     Pt reports migraine with Zofran    Adhesive Rash     Plastic tape    Avelox [moxifloxacin] Nausea And Vomiting    Compazine [prochlorperazine] Rash    Demerol  "[meperidine] Nausea And Vomiting    Neurontin [gabapentin] Nausea And Vomiting    Ultram [tramadol] Nausea And Vomiting     Social History     Socioeconomic History    Marital status: Single     Spouse name: Not on file    Number of children: Not on file    Years of education: Not on file    Highest education level: Not on file   Social Needs    Financial resource strain: Not on file    Food insecurity - worry: Not on file    Food insecurity - inability: Not on file    Transportation needs - medical: Not on file    Transportation needs - non-medical: Not on file   Occupational History    Not on file   Tobacco Use    Smoking status: Never Smoker    Smokeless tobacco: Never Used   Substance and Sexual Activity    Alcohol use: No    Drug use: No    Sexual activity: Not Currently     Partners: Male     Birth control/protection: Other-see comments, None   Other Topics Concern    Not on file   Social History Narrative    Not on file       Review of Systems  Constitutional: negative  Eyes: negative  Ears, nose, mouth, throat, and face: negative  Respiratory: negative  Cardiovascular: negative  Gastrointestinal: negative  Genitourinary:negative  Integument/breast: negative  Hematologic/lymphatic: negative  Musculoskeletal:negative  Neurological: negative  Behavioral/Psych: negative  Endocrine: negative  Allergic/Immunologic: negative    Objective:  Vital signs in last 24 hours:    Vitals:    01/03/19 1509   BP: 127/89   Pulse: 105   Weight: 64.5 kg (142 lb 3.2 oz)   Height: 5' 3" (1.6 m)     General: no acute distress, well nourished, well-groomed  CVS: RRR, no murmur, gallops or rubs  Respiratory: Clear to ausculation  Extremities: no edema    Neurological Examination:    HIGHER INTEGRATIVE FUNCTIONS:  -Normal attention span and concentration; immediately responds to questions and commands  -Oriented to time, place and person  -Recent and remote memory intact  -Language normal (no aphasia or " dysarthria)  -Normal fund of knowledge    CN:  -PERRLA, visual fields full, sharp disc margins on fundus exam  -EOMI with normal saccades and smooth pursuit  -Facial sensation intact bilaterally  -Facial strength/movement intact bilaterally  -Hearing intact to voice  -Palate elevates symmetrically  -Normal shoulder shrug and head turn  -Tongue protrudes midline    MOTOR: (left/right graded 1-5)  -UE: 5/5 deltoids; 5/5 biceps, triceps; 5/5 wrist flexors, extensors; 5/5 interosseous; 5/5   -LEs: 5/5 hip flexion, extension; 5/5 knee flexion, extension; 5/5 ankle flexion, extension  -Tone: normal  -No pronator drift, no orbiting    SENSORY:  -Light touch, temperature sensation intact bilaterally    REFLEXES:  -2+ upper and lower bilaterally  -Flexor plantar reflex bilaterally  -No clonus    COORDINATION:  -FNF, HKS, INGE intact bilaterally    GAIT:  -Normal casual gait    Assessment/Plan:  1. Left facial pain, suspicion for trigeminal neuralgia however we discussed the atypical features such as swelling on left side of face and intermittent fever which might suggest and underlying infection which needs to be evaluated by her dentist or oromaxillary surgeon.  2. Intermittent fever, per report  3. Left facial swelling  4. Left frontal DVA    Plan:  MRI Brain with and without contrast to evaluate for trigeminal neuralgia and DVA  We discussed starting treatment for pain but patient deferred medications for now until see obtains evaluation by dentist or oromaxillary surgeon.   I asked the patient to notify me if symptoms worsens    I discussed assessment and plan with patient and her mother and answered the questions that they had.

## 2019-01-09 ENCOUNTER — TELEPHONE (OUTPATIENT)
Dept: NEUROLOGY | Facility: CLINIC | Age: 29
End: 2019-01-09

## 2019-01-16 ENCOUNTER — TELEPHONE (OUTPATIENT)
Dept: NEUROLOGY | Facility: CLINIC | Age: 29
End: 2019-01-16

## 2019-01-16 ENCOUNTER — TELEPHONE (OUTPATIENT)
Dept: PHYSICAL MEDICINE AND REHAB | Facility: CLINIC | Age: 29
End: 2019-01-16

## 2019-01-16 NOTE — TELEPHONE ENCOUNTER
Decision to except patient  will be decided by the Doctor.    ----- Message from Colin BOLDEN Suzanna sent at 1/16/2019 12:16 PM CST -----  Needs Advice    Reason for call: Pt states she does not want to continue treatment w/ Dr. Trujillo and would like to schedule w/ Dr. Perez. Pt has only seen Dr. Trujillo once on 10/02/18.        Communication Preference: [phone]    Additional Information:

## 2019-01-16 NOTE — TELEPHONE ENCOUNTER
----- Message from Carmela Espana sent at 1/16/2019  1:56 PM CST -----  Contact: self/[phone]  Patient needs a referral from your office for the oral surgeon before she can schedule an appointment.    Please call and advise.

## 2019-01-16 NOTE — TELEPHONE ENCOUNTER
I returned patient call in regards to a oral surgeon referral. There was no answer and I left a message.

## 2019-01-17 ENCOUNTER — TELEPHONE (OUTPATIENT)
Dept: NEUROLOGY | Facility: CLINIC | Age: 29
End: 2019-01-17

## 2019-01-17 NOTE — TELEPHONE ENCOUNTER
----- Message from Elizabeth Mendoza sent at 1/17/2019  3:43 PM CST -----  Contact: [phone]/self  Patient called in returning your call. She did not get a message. Please advise.

## 2019-01-17 NOTE — TELEPHONE ENCOUNTER
I spoke to patient and she wanted to know if I told Dr. Wright of the oral surgeon referral. I let her know that I sent him a message but he is on vacation this week. And she understood.

## 2019-01-21 DIAGNOSIS — R51.9 LEFT FACIAL PAIN: Primary | ICD-10-CM

## 2019-01-22 ENCOUNTER — TELEPHONE (OUTPATIENT)
Dept: PHYSICAL MEDICINE AND REHAB | Facility: CLINIC | Age: 29
End: 2019-01-22

## 2019-01-22 NOTE — TELEPHONE ENCOUNTER
Patient has an appointment scheduled 05/2019.  Reminder letter mailed to her home.  Message on patient answer machine.      ----- Message from Colin Briseno sent at 1/22/2019 12:10 PM CST -----  Patient Returning Call from [facility]    Who Left Message for Patient: Kamilla  Communication Preference: [phone]  Additional Information:

## 2019-01-22 NOTE — TELEPHONE ENCOUNTER
Called and spoke with patient.  Patient was informed on the message.  Patient states she only wants to see Dr Perez she is willing to wait  2-3 months for an appointment.  Patient appointment scheduled and mailed to patient.          ----- Message from Shobha Perez MD sent at 1/21/2019  5:59 PM CST -----  Pls let her know my schedule is full for the next 2-3 months.  She can see some of the other PM&R doctors.  ----- Message -----  From: Kamilla Madison MA  Sent: 1/21/2019   1:11 PM  To: Shobha Perez MD        ----- Message -----  From: Colin Briseno  Sent: 1/21/2019  11:54 AM  To: Chris ZAMARRIPA Staff    Needs Advice     Reason for call: Pt states she does not want to continue treatment w/ Dr. Trujillo and would like to schedule w/ Dr. Perez. Pt has only seen Dr. Trujillo once on 10/02/18.        Communication Preference: [phone]     Additional Information: Pt said she spoke w/ Dr. Trujillo who advised it be best pt schedule w/ Dr. Perez. Pt is asking for a call back today.

## 2019-01-24 ENCOUNTER — TELEPHONE (OUTPATIENT)
Dept: NEUROLOGY | Facility: CLINIC | Age: 29
End: 2019-01-24

## 2019-01-24 NOTE — TELEPHONE ENCOUNTER
I returned patient call and she wants an external oral surgeon referral. I sent a message to Dr Wright,

## 2019-01-24 NOTE — TELEPHONE ENCOUNTER
I called patient to inform her that her orders for the oral surgeon was put in by Dr. Wright and I was faxing it over to [facility] now. She was appreciative.

## 2019-01-25 ENCOUNTER — TELEPHONE (OUTPATIENT)
Dept: NEUROLOGY | Facility: CLINIC | Age: 29
End: 2019-01-25

## 2019-01-25 NOTE — TELEPHONE ENCOUNTER
----- Message from Delphine Abreu sent at 1/25/2019 12:14 PM CST -----  Contact: Self/ [phone]  Patient asked to speak with you about her MRI.    Please call.

## 2019-01-25 NOTE — TELEPHONE ENCOUNTER
----- Message from Júnior Negron sent at 1/25/2019  1:07 PM CST -----  Contact: [phone]/self  Patient states she's returning your call   Please call and advise

## 2019-01-25 NOTE — TELEPHONE ENCOUNTER
I called patient in regards to her mri. She stated that the facility never called her and now that she has called them, they don't have any appt. Until feb. They did state that they will contact her if anyone cancels

## 2019-01-29 ENCOUNTER — TELEPHONE (OUTPATIENT)
Dept: NEUROLOGY | Facility: CLINIC | Age: 29
End: 2019-01-29

## 2019-01-29 NOTE — TELEPHONE ENCOUNTER
Patient left a message to give her a call about her oral surgeon referral. I called the patient back 3 times. There was no answer and I left a message for a return call.

## 2019-01-29 NOTE — TELEPHONE ENCOUNTER
I called patient and she says that the oral surgeon facility says they didn't receive the referral from us. So I am faxing it again.

## 2019-01-29 NOTE — TELEPHONE ENCOUNTER
----- Message from Lilli Rivera sent at 1/29/2019  1:30 PM CST -----  Contact: self @ [phone]  Pt says Dr Wright want her to see an Oral Surgeon but they will not see her without a referral from Dr Wright.  Pls call asap.

## 2019-01-29 NOTE — TELEPHONE ENCOUNTER
----- Message from Kayla Shultz sent at 1/29/2019  1:59 PM CST -----  Contact: [phone]  Patient called in returning your call. Please advise

## 2019-01-30 ENCOUNTER — OFFICE VISIT (OUTPATIENT)
Dept: PHYSICAL MEDICINE AND REHAB | Facility: CLINIC | Age: 29
End: 2019-01-30
Payer: MEDICARE

## 2019-01-30 ENCOUNTER — HOSPITAL ENCOUNTER (OUTPATIENT)
Dept: RADIOLOGY | Facility: HOSPITAL | Age: 29
Discharge: HOME | End: 2019-01-30
Attending: PHYSICAL MEDICINE & REHABILITATION
Payer: MEDICARE

## 2019-01-30 ENCOUNTER — TELEPHONE (OUTPATIENT)
Dept: PHYSICAL MEDICINE AND REHAB | Facility: CLINIC | Age: 29
End: 2019-01-30

## 2019-01-30 VITALS
WEIGHT: 144.25 LBS | DIASTOLIC BLOOD PRESSURE: 89 MMHG | HEIGHT: 63 IN | SYSTOLIC BLOOD PRESSURE: 137 MMHG | HEART RATE: 114 BPM | BODY MASS INDEX: 25.56 KG/M2

## 2019-01-30 DIAGNOSIS — M54.50 CHRONIC BILATERAL LOW BACK PAIN WITHOUT SCIATICA: ICD-10-CM

## 2019-01-30 DIAGNOSIS — M54.50 CHRONIC BILATERAL LOW BACK PAIN WITHOUT SCIATICA: Primary | ICD-10-CM

## 2019-01-30 DIAGNOSIS — G89.29 CHRONIC BILATERAL LOW BACK PAIN WITHOUT SCIATICA: Primary | ICD-10-CM

## 2019-01-30 DIAGNOSIS — G89.29 CHRONIC BILATERAL LOW BACK PAIN WITHOUT SCIATICA: ICD-10-CM

## 2019-01-30 DIAGNOSIS — N30.10 INTERSTITIAL CYSTITIS: ICD-10-CM

## 2019-01-30 PROCEDURE — 99213 OFFICE O/P EST LOW 20 MIN: CPT | Mod: PBBFAC,25 | Performed by: PHYSICAL MEDICINE & REHABILITATION

## 2019-01-30 PROCEDURE — 99999 PR PBB SHADOW E&M-EST. PATIENT-LVL III: CPT | Mod: PBBFAC,,, | Performed by: PHYSICAL MEDICINE & REHABILITATION

## 2019-01-30 PROCEDURE — 72100 X-RAY EXAM L-S SPINE 2/3 VWS: CPT | Mod: 26,,, | Performed by: RADIOLOGY

## 2019-01-30 PROCEDURE — 72120 XR LUMBAR SPINE AP AND LAT WITH FLEX/EXT: ICD-10-PCS | Mod: 26,,, | Performed by: RADIOLOGY

## 2019-01-30 PROCEDURE — 72100 XR LUMBAR SPINE AP AND LAT WITH FLEX/EXT: ICD-10-PCS | Mod: 26,,, | Performed by: RADIOLOGY

## 2019-01-30 PROCEDURE — 72120 X-RAY BEND ONLY L-S SPINE: CPT | Mod: 26,,, | Performed by: RADIOLOGY

## 2019-01-30 PROCEDURE — 72100 X-RAY EXAM L-S SPINE 2/3 VWS: CPT | Mod: TC

## 2019-01-30 PROCEDURE — 99999 PR PBB SHADOW E&M-EST. PATIENT-LVL III: ICD-10-PCS | Mod: PBBFAC,,, | Performed by: PHYSICAL MEDICINE & REHABILITATION

## 2019-01-30 PROCEDURE — 99215 OFFICE O/P EST HI 40 MIN: CPT | Mod: S$PBB,,, | Performed by: PHYSICAL MEDICINE & REHABILITATION

## 2019-01-30 PROCEDURE — 99215 PR OFFICE/OUTPT VISIT, EST, LEVL V, 40-54 MIN: ICD-10-PCS | Mod: S$PBB,,, | Performed by: PHYSICAL MEDICINE & REHABILITATION

## 2019-01-30 RX ORDER — OXYCODONE AND ACETAMINOPHEN 10; 325 MG/1; MG/1
1 TABLET ORAL 3 TIMES DAILY PRN
Qty: 90 TABLET | Refills: 0 | Status: SHIPPED | OUTPATIENT
Start: 2019-02-01 | End: 2019-02-19 | Stop reason: SDUPTHER

## 2019-01-30 NOTE — TELEPHONE ENCOUNTER
----- Message from Colin Briseno sent at 1/30/2019  9:15 AM CST -----  Pharmacy Calling    Reason for call: [facility] is calling to confirm it's okay to dispense medication, due to pt still taking the same medication from a diff doctor prescribed on 01/14    [facility]armacy Name: [facility]    Prescription Name: oxyCODONE-acetaminophen (PERCOCET)  mg per tablet    Phone Number: [phone]    Additional Information:

## 2019-01-30 NOTE — PATIENT INSTRUCTIONS
Neck/Back Pain [General]    Both neck and back pain are usually caused by injury to the muscles or ligaments of the spine. Sometimes the disks that separate each bone of the spine may cause pain by putting pressure on a nearby nerve. Back and neck pain may appear after a sudden twisting/bending force (such as in a car accident), or sometimes after a simple awkward movement. In either case, muscle spasm is often present and adds to the pain.  Acute neck and back pain usually gets better in one to two weeks. Pain related to disk disease, arthritis in the spinal joints or spinal stenosis (narrowing of the spinal canal) can become chronic and last for months or years.  Home Care:  · FOR NECK PAIN: Use a comfortable pillow that supports the head and keeps the spine in a neutral position. The position of the head should not be tilted forward or backward.  · FOR BACK PAIN: You may need to stay in bed the first few days. But, as soon as possible, begin sitting or walking to avoid problems with prolonged bed rest (muscle weakness, worsening back stiffness and pain, blood clots in the legs).  · When in bed, try to find a position of comfort. A firm mattress is best. Try lying flat on your back with pillows under your knees. You can also try lying on your side with your knees bent up towards your chest and a pillow between your knees.  · Avoid prolonged sitting. This puts more stress on the lower back than standing or walking.  · During the first two days after injury, apply an ICE PACK to the painful area for 20 minutes every 2-4 hours. This will reduce swelling and pain. HEAT (hot shower, hot bath or heating pad) works well for muscle spasm. You can start with ice, then switch to heat after two days. Some patients feel best alternating ice and heat treatments. Use the one method that feels the best to you.  · You may use acetaminophen (Tylenol) or ibuprofen (Motrin, Advil) to control pain, unless another pain medicine was  prescribed. [NOTE: If you have chronic liver or kidney disease or ever had a stomach ulcer or GI bleeding, talk with your doctor before using these medicines.]  · Be aware of safe lifting methods and do not lift anything over 15 pounds until all the pain is gone.  Follow Up  with your physician or this facility if your symptoms do not start to improve after one week. Physical therapy or further tests may be needed.  [NOTE: If X-rays were taken, they will be reviewed by a radiologist. You will be notified of any new findings that may affect your care.]  Get Prompt Medical Attention  if any of the following occur:  · Pain becomes worse or spreads into your arms or legs  · Weakness, numbness or pain in one or both arms or legs  · Loss of bowel or bladder control  · Numbness in the groin area  · Difficulty walking  · Fever of 100.4ºF (38ºC) or higher, or as directed by your healthcare provider  © 0147-6585 [facility], [address] [place] [address]. All rights reserved. This information is not intended as a substitute for professional medical care. Always follow your healthcare professional's instructions.    Back Exercises: Back Press  Do this exercise on your hands and knees. Keep your knees under your hips and your hands under your shoulders. Keep your spine in a neutral position (not arched or sagging). Be sure to maintain your necks natural curve.  · Tighten your abdominal and buttocks muscles to press your back upward. Let your head drop slightly.  · Hold for 5 seconds. Return to starting position.  · Repeat 5 times.     © 8353-4696 [facility], [address] [place] [address]. All rights reserved. This information is not intended as a substitute for professional medical care. Always follow your healthcare professional's instructions.    Back Exercises: Back Release  Do this exercise on your hands and knees. Keep your knees under your hips and your hands under your shoulders. Keep  your spine in a neutral position (not arched or sagging). Be sure to maintain your necks natural curve.  · Relax your abdominal and buttocks muscles, lift your head, and let your back sag. Be sure to keep your weight evenly distributed. Dont sit back on your hips.   · Hold for 5 seconds.  · Return to starting position.  · Repeat 5 times.  © 5760-0732 [facility] [place] [address]. All rights reserved. This information is not intended as a substitute for professional medical care. Always follow your healthcare professional's instructions.    Back Exercises: Bridge  The Bridge exercise strengthens your abdominal, buttocks, and hamstring muscles. This helps keep your back stable and aligned when you walk.  · Lie on the floor with your back and palms flat. Bend your knees. Keep your feet flat on the floor.  · Contract your abdominal and buttocks muscles. Slowly lift your buttocks off the floor until there is a straight line from your knees to your shoulders.  · Hold for 5  seconds. Repeat 10 times.    © 2661-5406 [place] [address]. All rights reserved. This information is not intended as a substitute for professional medical care. Always follow your healthcare professional's instructions.    Back Exercises: Elbow Press    To start, lie face down on your stomach, feet slightly apart, forehead on the floor. Breathe deeply. You should feel comfortable and relaxed in this position.  · Press up on your forearms. Keep your abdomen and hips on the floor.  · Hold for 20 seconds. Lower slowly.  · Repeat 2 times.  · Return to starting position.  © 5484-3575 [facility], [address] [place] [address]. All rights reserved. This information is not intended as a substitute for professional medical care. Always follow your healthcare professional's instructions.    Back Exercises: Pelvic Tilt  To start, lie on your back with your knees bent and  feet flat on the floor. Dont press your neck or lower back to the floor. Breathe deeply. You should feel comfortable and relaxed in this position.  · Tighten your abdomen and buttocks, and press your lower back toward the floor. This should be a small, subtle movement.  · Hold for 5 seconds. Release.  · Repeat 5 times.         © 9170-2151 [facility], [address] [place] [address]. All rights reserved. This information is not intended as a substitute for professional medical care. Always follow your healthcare professional's instructions.    Back Exercises: Partial Curl-Ups          To start, lie on your back with your knees bent and feet flat on the floor. Dont press your neck or lower back to the floor. Breathe deeply. You should feel comfortable and relaxed in this position.  · Cross your arms loosely.  · Tighten your abdomen and curl custodial up, keeping your head in line with your shoulders.  · Hold for 5 seconds. Uncurl to lie down.  · Repeat 5 times.   © 5987-0185 [facility], [address] [place] [address]. All rights reserved. This information is not intended as a substitute for professional medical care. Always follow your healthcare professional's instructions.    Back Exercises: Lower Back Stretch                            To start, sit in a chair with your feet flat on the floor. Shift your weight slightly forward to avoid rounding your back. Relax, and keep your ears, shoulders, and hips aligned.  · Sit with your feet well apart.  · Bend forward and touch the floor with the backs of your hands. Relax and let your body drop.  · Hold for 20 seconds. Return to starting position.  · Repeat 2 times.   © 3055-2359 [facility], [address] [place] [address]. All rights reserved. This information is not intended as a substitute for professional medical care. Always follow your healthcare professional's instructions.    Back Exercises: Seated Rotation      To  start, sit in a chair with your feet flat on the floor. Shift your weight slightly forward to avoid rounding your back. Relax, and keep your ears, shoulders, and hips aligned.  · Fold your arms, elbows just below shoulder height.  · Turn from the waist with hips forward. Turn your head last.  · Hold for a count of 5. Return to starting position.  · Repeat 5 times on one side. Then switch sides.  © 2560-2990 [facility], [address] [place] [address]. All rights reserved. This information is not intended as a substitute for professional medical care. Always follow your healthcare professional's instructions.    Back Exercises: Side Stretch  To start, sit in a chair with your feet flat on the floor. Shift your weight slightly forward to avoid rounding your back. Relax. Keep your ears, shoulders, and hips aligned.  · Stretch your right arm overhead.  · Slowly bend to the left. Dont twist your torso.  · Hold for 20 seconds. Return to starting position.  · Repeat 2 times. Then, switch to the other side.  © 4814-9997 [facility], [address] [place] [address]. All rights reserved. This information is not intended as a substitute for professional medical care. Always follow your healthcare professional's instructions

## 2019-01-30 NOTE — PROGRESS NOTES
Subjective:       Patient ID: Kendra Russell is a 28 y.o. female.    Chief Complaint: No chief complaint on file.    HPI     HISTORY OF PRESENT ILLNESS:  Ms. Russell is a 28-year-old white female who is   presenting to the [facility] for chronic right back and buttock   pain.  She was [facility], but is   switching providers.  Her past medical history is significant for interstitial   cystitis.  The patient underwent back surgery for placement of a bladder   stimulator in 2010.  However, this failed and she needed subsequent removal of   the stimulator.  She has been complaining of chronic back pain since.  She also   has a history of gastric ulcer.  She has history of colon surgery in 2012 due to   bowel obstruction.  She has more recent history of trigeminal neuralgia for   which she has been seen by Dentistry and Neurology.  The patient has been   followed up for the last six months or so by Dr. Kina Bailey, a pain specialist   in [place].  She had multiple imaging studies.  She also reports being   offered spine injections, but she declined.  She has been maintained on oral   medication, namely oxycodone/APAP p.r.n.  She is intolerant/allergic to multiple   medications including hydrocodone, tramadol, gabapentin, Cymbalta.  She cannot   take NSAIDs due to history of gastric ulcer.    Currently, her pain is a constant dull aching sensation in the lower lumbar   spine and right buttock region at the site of her previous surgery.  She has   occasional shooting pain to the buttock, but she denies any radiation distally   to her legs.  Her pain is aggravated by prolonged standing or sitting, walking,   bending and lifting.  It is better with rest, lying down and heating pad.  Her   maximum pain is 9-10/10 and minimum 3-4/10.  Today, it is 7-8/10.  The patient   denies any lower extremity weakness.  She denies any leg numbness.    She is currently taking  oxycodone/APAP 10/325 p.r.n. previously four, but more   recently three times per day.      MS/HN  dd: 01/30/2019 11:50:49 (CST)  td: 01/31/2019 07:19:24 (CST)  Doc ID   #[id number]  Job ID #[id number]    CC:       Review of Systems   Constitutional: Negative for chills, fatigue and fever.   HENT: Negative for trouble swallowing.    Eyes: Positive for visual disturbance.   Respiratory: Negative for shortness of breath.    Cardiovascular: Negative for chest pain.   Gastrointestinal: Positive for nausea and vomiting. Negative for constipation.   Genitourinary: Positive for difficulty urinating.   Musculoskeletal: Positive for back pain. Negative for arthralgias, gait problem, joint swelling and neck pain.   Neurological: Positive for dizziness. Negative for headaches.   Psychiatric/Behavioral: Negative for behavioral problems and sleep disturbance.       Objective:      Physical Exam   Constitutional: She is oriented to person, place, and time. She appears well-developed and well-nourished.   HENT:   Head: Normocephalic and atraumatic.   Neck: Normal range of motion.   Mild tenderness.   Musculoskeletal:   BUE:  ROM:   RUE: full.   LUE: full.  Strength:    RUE: 5/5 at shoulder abduction, 5 elbow flexion, 5 elbow extension, 5 hand .   LUE: 5/5 at shoulder abduction, 5 elbow flexion, 5 elbow extension, 5 hand .  Sensation to pinprick:   RUE: intact.   LUE: intact.  DTR:    RUE: +2 biceps, +2 triceps.   LUE:  +2 biceps, +2 triceps.    BLE:  ROM:   RLE: full.   LLE: full.  Knee crepitus:   RLE: -ve.   LLE: -ve.   Strength:    RLE: 5/5 at hip flexion, 5 knee extension, 5 ankle DF, 5 PF, 5 EHL.   LLE: 5/5 at hip flexion, 5 knee extension, 5 ankle DF, 5 PF, 5 EHL.  Sensation to pinprick:     RLE: intact.      LLE: intact.   DTR:     RLE: +2 knee, +2 ankle.    LLE: +2 knee, +2 ankle.  Clonus:    Rt ankle: -ve.    Lt ankle: -ve.  SLR:      RLE: +ve at 60 degrees.      LLE: +ve at 60 degrees.   TITA:     RLE: -ve.      LLE:  -ve.  SI tenderness:     RLE: +ve.      LLE: -ve.  Gilet's test:     RLE: +ve.      LLE: -ve.  Healed scar over Rt buttock.  +ve mild tenderness over lumbar spine.  90 leg length discrepancy.    Directional Preference:  Spine flexion: 90 degrees , mild pain in back.  Spine extension: 30 degrees, mild pain in back.  Lateral bending: mild pain to Right, mild pain to Left.      Heel walking: WNL.  Toe walking: WNL.  Gait: WNL     Neurological: She is alert and oriented to person, place, and time.   Skin: Skin is warm.   Psychiatric: She has a normal mood and affect. Her behavior is normal.   Vitals reviewed.      Assessment:       1. Chronic bilateral low back pain without sciatica    2. Interstitial cystitis        Plan:       - X-Ray Lumbar Spine Ap Lateral w/Flex Ext; Future  - Continue oxyCODONE-acetaminophen (PERCOCET)  mg per tablet; Take 1 tablet by mouth 3 (three) times daily as needed for Pain.  - A Pain contract was signed and will be scanned into the chart.  - The patient was encouraged to adopt a regular Home Exercise Program, and provided with printouts.  - Follow-up in about 2 months (around 3/30/2019).      This was a 40 minute visit, 50% of which was spent educating the patient about the diagnosis and the treatment plan.

## 2019-01-31 ENCOUNTER — TELEPHONE (OUTPATIENT)
Dept: PHYSICAL MEDICINE AND REHAB | Facility: CLINIC | Age: 29
End: 2019-01-31

## 2019-01-31 NOTE — TELEPHONE ENCOUNTER
Called and spoke with patient.  Patient was confused on why  her Rx refill and her follow up appointment  Did not match up on the same day.  Patient thought she were to come to receive her Rx refill the same day as her appointment so patient wanted to change appointments.  Patient was informed to call her Pharmacy for a refill and they will inform the office.  Patient will keep her appointment.      ----- Message from Ping Martinez sent at 1/31/2019  2:58 PM CST -----  Contact: pt @ [phone]  Calling regarding her appts, says she was in the office on yesterday and Dr. Perez wanted her schedule 2 months out, but her appt is only 1 month, calling to confirm that 3/4 is enough time between visits. Please call patient to discuss.

## 2019-02-19 DIAGNOSIS — M54.50 CHRONIC BILATERAL LOW BACK PAIN WITHOUT SCIATICA: ICD-10-CM

## 2019-02-19 DIAGNOSIS — G89.29 CHRONIC BILATERAL LOW BACK PAIN WITHOUT SCIATICA: ICD-10-CM

## 2019-02-19 RX ORDER — OXYCODONE AND ACETAMINOPHEN 10; 325 MG/1; MG/1
1 TABLET ORAL EVERY 6 HOURS PRN
Qty: 120 TABLET | Refills: 0 | Status: SHIPPED | OUTPATIENT
Start: 2019-02-20 | End: 2019-02-20 | Stop reason: SDUPTHER

## 2019-02-19 NOTE — TELEPHONE ENCOUNTER
Dr. Perez the patient stated that you told her she could increase to 4 times a day taking the medication so now she is out and asking for more.  Please call to discuss or send refill.  I did not see anything in chart stating this

## 2019-02-19 NOTE — TELEPHONE ENCOUNTER
01/30/19 last Rx refill  01/30/19 last office visit  03/04/19 RTC    ----- Message from Ping Martinez sent at 2/19/2019 12:35 PM CST -----  Contact: pt@ [phone]  Asking to speak with Dr. Perez's office regarding her medications, says she was told by the doctor to call to give details, and when she completed the medication. Please call.

## 2019-02-19 NOTE — TELEPHONE ENCOUNTER
----- Message from Colin BOLDEN Suzanna sent at 2/19/2019  4:24 PM CST -----  Rx Refill/Request     Is this a Refill or New Rx: Refill    Rx Name and Strength: oxyCODONE-acetaminophen (PERCOCET)  mg per tablet  Preferred Pharmacy with phone number: see below  Communication Preference: [phone]  Additional Information: Pt said the doctor needs to change the prescription. Pt states pharmacy closes at 6:30 PM and she's sking this be completed before then. Pt is asking to speak w/ the nurse regarding this.    [facility] [place] - [address]  [address]  [place] [address]  Phone: [phone] Fax: [phone]

## 2019-02-20 ENCOUNTER — TELEPHONE (OUTPATIENT)
Dept: NEUROLOGY | Facility: CLINIC | Age: 29
End: 2019-02-20

## 2019-02-20 DIAGNOSIS — M54.50 CHRONIC BILATERAL LOW BACK PAIN WITHOUT SCIATICA: ICD-10-CM

## 2019-02-20 DIAGNOSIS — Q28.3 DEVELOPMENTAL VENOUS ANOMALY: Primary | ICD-10-CM

## 2019-02-20 DIAGNOSIS — G89.29 CHRONIC BILATERAL LOW BACK PAIN WITHOUT SCIATICA: ICD-10-CM

## 2019-02-20 RX ORDER — OXYCODONE AND ACETAMINOPHEN 10; 325 MG/1; MG/1
1 TABLET ORAL EVERY 6 HOURS PRN
Qty: 120 TABLET | Refills: 0 | Status: SHIPPED | OUTPATIENT
Start: 2019-02-20 | End: 2019-03-15 | Stop reason: SDUPTHER

## 2019-02-20 NOTE — TELEPHONE ENCOUNTER
Spoke with patient to inform her that the vascular neurology didn't have a schedule opened yet and they will be open next week.She understood

## 2019-02-20 NOTE — TELEPHONE ENCOUNTER
I called the pt.  She said she had nose surgery.  She is having more pain and may have taken more pain medications.  Her last refill for 90 tablets of oxycodone/apap 10/325 was picked up per  on 2/1/19.  I told her she has to keep track of her pain medication use.  I agreed to refill it tomorrow.

## 2019-02-20 NOTE — TELEPHONE ENCOUNTER
Pharmacy change request  Called to cancel the [facility] prescription to [facility].  Last visit: 01/30/2019  Next visit: 03/04/2019  Last refill Percocet: 02/20/2019      ----- Message from Lilli Rivera sent at 2/20/2019 10:59 AM [facility] -----  Contact: self @ [phone]  Pts prescription for oxycodone 10/325 was sent to [facility] in error.  Pt says it was suppose to go to [facility]acy.  Pls send in asap.     [facility] [place] - [address]                                     [phone] (Phone)          [phone] (Fax)

## 2019-03-04 ENCOUNTER — OFFICE VISIT (OUTPATIENT)
Dept: PHYSICAL MEDICINE AND REHAB | Facility: CLINIC | Age: 29
End: 2019-03-04
Payer: MEDICARE

## 2019-03-04 VITALS
DIASTOLIC BLOOD PRESSURE: 84 MMHG | HEIGHT: 63 IN | SYSTOLIC BLOOD PRESSURE: 132 MMHG | HEART RATE: 108 BPM | WEIGHT: 144.31 LBS | BODY MASS INDEX: 25.57 KG/M2

## 2019-03-04 DIAGNOSIS — Z79.891 CHRONIC USE OF OPIATE FOR THERAPEUTIC PURPOSE: ICD-10-CM

## 2019-03-04 DIAGNOSIS — M26.609 TMJ DYSFUNCTION: ICD-10-CM

## 2019-03-04 DIAGNOSIS — G89.29 CHRONIC BILATERAL LOW BACK PAIN WITHOUT SCIATICA: Primary | ICD-10-CM

## 2019-03-04 DIAGNOSIS — M54.50 CHRONIC BILATERAL LOW BACK PAIN WITHOUT SCIATICA: Primary | ICD-10-CM

## 2019-03-04 PROCEDURE — 99999 PR PBB SHADOW E&M-EST. PATIENT-LVL II: CPT | Mod: PBBFAC,,, | Performed by: PHYSICAL MEDICINE & REHABILITATION

## 2019-03-04 PROCEDURE — 99214 OFFICE O/P EST MOD 30 MIN: CPT | Mod: S$GLB,,, | Performed by: PHYSICAL MEDICINE & REHABILITATION

## 2019-03-04 PROCEDURE — 99214 PR OFFICE/OUTPT VISIT, EST, LEVL IV, 30-39 MIN: ICD-10-PCS | Mod: S$GLB,,, | Performed by: PHYSICAL MEDICINE & REHABILITATION

## 2019-03-04 PROCEDURE — 99212 OFFICE O/P EST SF 10 MIN: CPT | Mod: PBBFAC | Performed by: PHYSICAL MEDICINE & REHABILITATION

## 2019-03-04 PROCEDURE — 99999 PR PBB SHADOW E&M-EST. PATIENT-LVL II: ICD-10-PCS | Mod: PBBFAC,,, | Performed by: PHYSICAL MEDICINE & REHABILITATION

## 2019-03-04 NOTE — PROGRESS NOTES
Subjective:       Patient ID: Kendra Russell is a 28 y.o. female.    Chief Complaint: No chief complaint on file.    HPI     HISTORY OF PRESENT ILLNESS:  Mrs. Russell is a 28-year-old white female who   presented to the Physical Medicine Clinic on 01/30/2019 for chronic low back   pain.  Her past medical history is significant for interstitial cystitis, status   post bladder stimulator in 2010 and subsequent removal, colon surgery in 2012   for bowel obstruction, TMJ dysfunction and trigeminal neuralgia.  The patient   presented to the Rehab Clinic on 01/30/2019.  X-rays of the lumbar spine were   ordered.  She was maintained on oxycodone/APAP (previously prescribed by her   pain specialist outside [facility]).    The patient has come to the clinic for followup.  She is still under duress due   to her trigeminal neuralgia as well as TMJ dysfunction.  She was seen recently   by Neurology outside [facility].  She was complaining of dizziness and blurred   vision, headaches, upper and lower extremity weakness.  She was told that   further Neurology workup was needed and she may be referred to Vascular   Neurology.    Her low back pain has been recently worse.  It is a constant aching pain in the   lower lumbar spine.  She has occasional shooting pain to the right buttock.  She   denies any radiation distally to her legs.  Her pain is worse with activity and   with bending.  It is better with rest.  Her maximum pain is 9/10 and minimum   3/10.  Today, it is 5/10.  The patient denies any lower extremity weakness.  She   denies any leg numbness.  She denies any bowel or bladder incontinence.  She   thinks she has occasional swelling in her lower lumbar/upper sacral region with   fluctuance.    As noted previous visit, the patient is allergic/intolerant to multiple   medications.  She is currently taking oxycodone/APAP 10/325 p.r.n. four times   per day.  She has been trying to exercise regularly.      MS/HN  dd: 03/04/2019  11:34:12 (CST)  td: 03/05/2019 01:20:40 (CST)  Doc ID   #[id number]  Job ID #[id number]    CC:         Review of Systems   Constitutional: Negative for chills, fatigue and fever.   HENT: Negative for trouble swallowing.    Eyes: Positive for visual disturbance.   Respiratory: Negative for shortness of breath.    Cardiovascular: Negative for chest pain.   Gastrointestinal: Positive for nausea and vomiting. Negative for constipation.   Genitourinary: Positive for difficulty urinating.   Musculoskeletal: Positive for back pain. Negative for arthralgias, gait problem, joint swelling and neck pain.   Neurological: Positive for dizziness. Negative for headaches.   Psychiatric/Behavioral: Negative for behavioral problems and sleep disturbance.       Objective:      Physical Exam   Constitutional: She is oriented to person, place, and time. She appears well-developed and well-nourished.   HENT:   Head: Normocephalic and atraumatic.   Neck: Normal range of motion.   Mild tenderness.   Musculoskeletal:   BLE:  ROM:   RLE: full.   LLE: full.  Knee crepitus:   RLE: -ve.   LLE: -ve.   Strength:    RLE: 5/5 at hip flexion, 5 knee extension, 5 ankle DF, 5 PF, 5 EHL.   LLE: 5/5 at hip flexion, 5 knee extension, 5 ankle DF, 5 PF, 5 EHL.  Sensation to pinprick:     RLE: intact.      LLE: intact.   DTR:     RLE: +2 knee, +2 ankle.    LLE: +2 knee, +2 ankle.  Clonus:    Rt ankle: -ve.    Lt ankle: -ve.  SLR:      RLE: -ve at 60 degrees.      LLE: -ve at 60 degrees.   TITA:     RLE: -ve.      LLE: -ve.  SI tenderness:     RLE: -ve.      LLE: -ve.  Gilet's test:     RLE: -ve.      LLE: -ve.  Healed scar over Rt buttock.    +ve mild tenderness over low lumbar spine.    Gait: WNL     Neurological: She is alert and oriented to person, place, and time.   Skin: Skin is warm.   Psychiatric: She has a normal mood and affect. Her behavior is normal.   Vitals reviewed.        IMAGING STUDIES:    XR LUMBAR SPINE AP AND LAT WITH FLEX/EXT  (1/30/19)    CLINICAL HISTORY:  Low back pain    FINDINGS:  No fracture dislocation bone destruction seen.  There is mild anterolisthesis of L5 on S1.  No instability seen.  No fracture dislocation bone destruction seen.    Impression      See above    No acute process seen.      Assessment:       1. Chronic bilateral low back pain without sciatica    2. TMJ dysfunction    3. Chronic use of opiate for therapeutic purpose        Plan:       - X-Ray findings were discussed with the patient.  - Continue oxyCODONE-acetaminophen (PERCOCET)  mg per tablet; Take 1 tablet by mouth 3 (three) times daily as needed for Pain.  - A Pain contract was signed and will be scanned into the chart.  - Pain Clinic Drug Screen; Future  - Regular home exercise program was encouraged.  - Follow-up in about 3 months (around 6/4/2019).     This was a 25 minute visit, more than 50% of which was spent counseling the patient about the diagnosis and the treatment plan.      This was a 25 minute visit, more than 50% of which was spent counseling the patient about the diagnosis and the treatment plan.

## 2019-03-08 ENCOUNTER — TELEPHONE (OUTPATIENT)
Dept: PHYSICAL MEDICINE AND REHAB | Facility: CLINIC | Age: 29
End: 2019-03-08

## 2019-03-08 NOTE — TELEPHONE ENCOUNTER
Called lab, they say the sample leaked in transit and will need to be recollected.    ----- Message from Alma Rothman sent at 3/8/2019 11:09 AM CST -----  There was an issue with a test ordered on this patient from 03/04/2019. Please call the [facility] lab as soon as possible at [phone] ext. [phone] for complete details.  Anyone in the [facility] lab will be able to assist you with further information.

## 2019-03-15 ENCOUNTER — TELEPHONE (OUTPATIENT)
Dept: PHYSICAL MEDICINE AND REHAB | Facility: CLINIC | Age: 29
End: 2019-03-15

## 2019-03-15 DIAGNOSIS — M54.50 CHRONIC BILATERAL LOW BACK PAIN WITHOUT SCIATICA: ICD-10-CM

## 2019-03-15 DIAGNOSIS — G89.29 CHRONIC BILATERAL LOW BACK PAIN WITHOUT SCIATICA: ICD-10-CM

## 2019-03-15 RX ORDER — OXYCODONE AND ACETAMINOPHEN 10; 325 MG/1; MG/1
1 TABLET ORAL EVERY 6 HOURS PRN
Qty: 120 TABLET | Refills: 0 | Status: SHIPPED | OUTPATIENT
Start: 2019-03-22 | End: 2019-03-18 | Stop reason: SDUPTHER

## 2019-03-15 NOTE — TELEPHONE ENCOUNTER
02/20/19 last Rx refill  03/04/19 last office visit  07/18/19 RTC    ----- Message from Dawson Perdomo sent at 3/15/2019 11:06 AM CDT -----        Medication #1 oxyCODONE-acetaminophen (PERCOCET)  mg per tablet    Preferred Pharmacy: [facility] [place] - [address]

## 2019-03-18 ENCOUNTER — TELEPHONE (OUTPATIENT)
Dept: PHYSICAL MEDICINE AND REHAB | Facility: CLINIC | Age: 29
End: 2019-03-18

## 2019-03-18 DIAGNOSIS — M54.50 CHRONIC BILATERAL LOW BACK PAIN WITHOUT SCIATICA: ICD-10-CM

## 2019-03-18 DIAGNOSIS — G89.29 CHRONIC BILATERAL LOW BACK PAIN WITHOUT SCIATICA: ICD-10-CM

## 2019-03-18 RX ORDER — OXYCODONE AND ACETAMINOPHEN 10; 325 MG/1; MG/1
1 TABLET ORAL EVERY 6 HOURS PRN
Qty: 120 TABLET | Refills: 0 | Status: SHIPPED | OUTPATIENT
Start: 2019-03-21 | End: 2019-04-15 | Stop reason: SDUPTHER

## 2019-03-18 NOTE — TELEPHONE ENCOUNTER
----- Message from Elias Mccormick sent at 3/18/2019 11:29 AM CDT -----  Contact: [facility] @  [phone]  Caller requesting a return call per patient request about changing the release date on the (oxyCODONE-acetaminophen (PERCOCET)  mg per tablet )  from 3-22nd to 3-21st, pls call to advise     [facility] [place] - [address]  [address]  [place] [address]  Phone: [phone] Fax: [phone]

## 2019-04-08 ENCOUNTER — TELEPHONE (OUTPATIENT)
Dept: PHYSICAL MEDICINE AND REHAB | Facility: CLINIC | Age: 29
End: 2019-04-08

## 2019-04-08 NOTE — TELEPHONE ENCOUNTER
----- Message from Elias Mccormick sent at 4/8/2019 11:13 AM CDT -----  Contact: [facility] @  [phone]  Caller requesting a return caller regarding the medication (oxyCODONE-acetaminophen (PERCOCET)  mg per tablet ) pls call to discuss     [facility] [place] - [address]  [address]  [place] [address]  Phone: [phone] Fax: [phone]

## 2019-04-08 NOTE — TELEPHONE ENCOUNTER
"Called and spoke with [facility].  She asked if the Doctor can give her a call.  Patient received Percocet 5-325,10 tablets from the emergency room but is taking  Percocet  from your office.  Asking to give her a call which to refill.        Jace ZAMARRIPA Staff   Caller: [facility] @  [phone] (Today, 11:13 AM)             Caller requesting a return caller regarding the medication (oxyCODONE-acetaminophen (PERCOCET)  mg per tablet ) pls call to discuss     [facility] [place] - [address]   [address]   [place] [address]   Phone: [phone] Fax: [phone]              "

## 2019-04-15 ENCOUNTER — TELEPHONE (OUTPATIENT)
Dept: PHYSICAL MEDICINE AND REHAB | Facility: CLINIC | Age: 29
End: 2019-04-15

## 2019-04-15 DIAGNOSIS — G89.29 CHRONIC BILATERAL LOW BACK PAIN WITHOUT SCIATICA: ICD-10-CM

## 2019-04-15 DIAGNOSIS — M54.50 CHRONIC BILATERAL LOW BACK PAIN WITHOUT SCIATICA: ICD-10-CM

## 2019-04-15 RX ORDER — OXYCODONE AND ACETAMINOPHEN 10; 325 MG/1; MG/1
1 TABLET ORAL EVERY 6 HOURS PRN
Qty: 120 TABLET | Refills: 0 | Status: SHIPPED | OUTPATIENT
Start: 2019-04-15 | End: 2019-05-14 | Stop reason: SDUPTHER

## 2019-04-15 NOTE — TELEPHONE ENCOUNTER
I called the patient.  He is going out of town to [place] later today.  I checked her [place]  and her last refill was picked up on 03/21/2019.  Her next refill is due on 04/20/2019.  I sent a refill for today with a message to the pharmacist that the next refill is still do on 05/20/2019.

## 2019-04-15 NOTE — TELEPHONE ENCOUNTER
----- Message from Lilli Rivera sent at 4/15/2019 11:27 AM CDT -----  Contact: self @ [phone]  Pt says she has to leave town unexpectedly and may be gone for a couple of weeks.  She would like to speak with someone asap about her medication, oxycodone 10/325 that is due to refill on 4-20-19.  Would like to speak with someone before she leaves this afternoon.  Pls call asap.

## 2019-04-15 NOTE — TELEPHONE ENCOUNTER
----- Message from Ping Martinez sent at 4/15/2019 12:07 PM CDT -----  Contact: pt @ [phone]  Says she is returning a missed call from Dr. Perez's office says if the call was accidentally done, there is no need to call back.

## 2019-04-15 NOTE — TELEPHONE ENCOUNTER
----- Message from Kamilla Madison MA sent at 4/15/2019 12:59 PM CDT -----  Contact: pt @ [phone]      ----- Message -----  From: Ping Martinez  Sent: 4/15/2019  12:07 PM  To: Chris ZAMARRIPA Staff    Says she is returning a missed call from Dr. Perez's office says if the call was accidentally done, there is no need to call back.

## 2019-05-08 ENCOUNTER — OFFICE VISIT (OUTPATIENT)
Dept: NEUROLOGY | Facility: CLINIC | Age: 29
End: 2019-05-08
Payer: MEDICARE

## 2019-05-08 VITALS
HEIGHT: 63 IN | WEIGHT: 144 LBS | DIASTOLIC BLOOD PRESSURE: 84 MMHG | HEART RATE: 103 BPM | SYSTOLIC BLOOD PRESSURE: 120 MMHG | BODY MASS INDEX: 25.52 KG/M2

## 2019-05-08 DIAGNOSIS — G89.4 CHRONIC PAIN SYNDROME: Primary | ICD-10-CM

## 2019-05-08 DIAGNOSIS — Q28.3 DEVELOPMENTAL VENOUS ANOMALY: ICD-10-CM

## 2019-05-08 DIAGNOSIS — G50.0 TRIGEMINAL NEURALGIA: ICD-10-CM

## 2019-05-08 PROCEDURE — 99215 OFFICE O/P EST HI 40 MIN: CPT | Mod: S$GLB,,, | Performed by: PSYCHIATRY & NEUROLOGY

## 2019-05-08 PROCEDURE — 99999 PR PBB SHADOW E&M-EST. PATIENT-LVL IV: CPT | Mod: PBBFAC,,, | Performed by: PSYCHIATRY & NEUROLOGY

## 2019-05-08 PROCEDURE — 99215 PR OFFICE/OUTPT VISIT, EST, LEVL V, 40-54 MIN: ICD-10-PCS | Mod: S$GLB,,, | Performed by: PSYCHIATRY & NEUROLOGY

## 2019-05-08 PROCEDURE — 99999 PR PBB SHADOW E&M-EST. PATIENT-LVL IV: ICD-10-PCS | Mod: PBBFAC,,, | Performed by: PSYCHIATRY & NEUROLOGY

## 2019-05-08 RX ORDER — CELECOXIB 200 MG/1
CAPSULE ORAL
COMMUNITY
End: 2019-06-03

## 2019-05-08 RX ORDER — ESOMEPRAZOLE MAGNESIUM 40 MG/1
40 CAPSULE, DELAYED RELEASE ORAL
COMMUNITY
Start: 2015-08-24 | End: 2019-06-03

## 2019-05-08 NOTE — PROGRESS NOTES
"Vascular Neurology  Clinic Note    Reason For Visit (Chief Complaint): DVA, facial pain    HPI: 28 y.o. woman with PMH interstitial cystitis, chronic pain, traumatic TGN, migraines who presents for evaluation of incidentally discovered DVA.    Patient first sought neurological care with Dr. Wright when she developed facial pain following a dental procedure last summer.  She believes the dentist hit a nerve while applying anesthesia.  Since then she has had swelling and pain throughout the L side of her face.  Rather than improve over time, it has become more severe, and is now radiating up the top of her head and to the R side of her face.  Facial CT (done by oral surgeon) was reportedly normal.  Dr. Wright ordered MRI, which was unremarkable aside from L frontal convexity DVA (no cavernoma seen).  She has been managing her facial pain with CBD oil, as she has allergies to several neuropathic pain medications.  She also sees a pain specialist for her other ailments.  She expresses concern that her facial symptoms are getting worse, and that the pain feels "inside" her head, rather than just localized to her face.  She has done research on TGN and wonders if something intracranial may be responsible for her symptoms.    Brain Imaging:  MRI Brain 2/11/19:  L frontal convexity DVA    Cardiac Evaluation:  No results found for this or any previous visit.    I independently viewed the above imaging studies and  I reviewed the reports of the above imaging.  I reviewed the above labwork.    Review of Systems  Msk: negative for muscle pain  Skin: negative for pruritis  Neuro: + facial pain  All others negative    Past Medical History  Past Medical History:   Diagnosis Date    FHx: bowel obstruction     HA (headache)     Interstitial cystitis     Ovarian cyst     Ulcer      Family History  Cancer-related family history includes Cancer in her paternal grandmother. There is no history of Breast cancer.    Social " "History  Non-smoker.  Not currently working due to medical issues.    Medication List with Changes/Refills   New Medications    CARBAMAZEPINE (TEGRETOL) 100 MG CHEWABLE TABLET    Take 1 tablet (100 mg total) by mouth 2 (two) times daily. In 1-2 days, if no relief, may increase dose to 3 times per day.   Current Medications    CELECOXIB (CELEBREX) 200 MG CAPSULE    celecoxib 200 mg capsule    ESOMEPRAZOLE (NEXIUM) 40 MG CAPSULE    Take 40 mg by mouth.    ETONOGESTREL/ETHINYL ESTRADIOL (NUVARING VAGL)    Place vaginally.   Changed and/or Refilled Medications    Modified Medication Previous Medication    OXYCODONE-ACETAMINOPHEN (PERCOCET)  MG PER TABLET oxyCODONE-acetaminophen (PERCOCET)  mg per tablet       Take 1 tablet by mouth every 6 (six) hours as needed for Pain.    Take 1 tablet by mouth every 6 (six) hours as needed for Pain.       EXAM  Vital Signs:Blood pressure 120/84, pulse 103, height 5' 3" (1.6 m), weight 65.3 kg (144 lb).  General: well appearing without discomfort   HEENT: very subtle fullness of L side of face   CV: RRR, nL S1&S2  Resp: breathing comfortably, no wheezing  Ext: wwp, no pedal edema    Mental Status: Alert and oriented, normal attention, speech fluent and prosodic, naming and repetition intact, follows multistep embedded commands, no e/o neglect or extinction  Cranial Nerves: PERRL, EOMI, VFF, sensation intact (+hyperesthesia LV1-V3), face symmetric, TUP midline, SCM/trap 5/5  Motor: Normal bulk and tone, no drift, finger taps symmetric  Strength 5/5 throughout  Sensory: intact light touch bilaterally  Coordination: no ataxia on finger-to-nose  Gait & Stance: normal  DTR: 2+ symmetric    NIHSS - 0     ___________________  ASSESSMENT & PLAN  Ms. Russell is a 28 y.o. woman with PMH interstitial cystitis, chronic pain, traumatic TGN, migraines who presents for evaluation of DVA.  We discussed that the DVA is likely an incidental finding and not related to her symptoms of facial " pain.  No further treatment or monitoring necessary for DVA.  Her facial pain does seem to be a traumatic case of trigeminal neuralgia, following a dental injection.  We discussed that it often takes up to 12 mos for traumatic nerve injury to improve.  No need for additional CNS imaging as injury to TGN is most certainly extracranial.  I think she would be best served by seeing a specialist in this area (role for EMG?).   Will discuss with neuromuscular and facial pain colleagues.    - Continue symptomatic treatment of facial pain.  - No follow-up needed of DVA.  - No need to return to vascular clinic.    Problem List Items Addressed This Visit        Unprioritized    Chronic pain - Primary    Developmental venous anomaly    Trigeminal neuralgia          Josie Driver MD  Vascular Neurology

## 2019-05-08 NOTE — LETTER
May 13, 2019      Evan Wright MD  [address] [address]           [facility]  [address]  [facility] [place] [facility] [address]  Phone: [phone]          Patient: Kendra Russell   MR Number: [id number]   YOB: 1990   Date of Visit: 5/8/2019       Dear Dr. Evan Wright:    Thank you for referring Kendra Russell to me for evaluation. Attached you will find relevant portions of my assessment and plan of care.    If you have questions, please do not hesitate to call me. I look forward to following Kendra Russell along with you.    Sincerely,    Sidney Mejia  CC:  No Recipients    If you would like to receive this communication electronically, please contact [email or web address] or [phone] to request more information on [employer] access.    For providers and/or their staff who would like to refer a patient to [facility], please contact us through our one-stop-shop provider referral line, Arabella Daley, at [phone].    If you feel you have received this communication in error or would no longer like to receive these types of communications, please e-mail [email or web address]

## 2019-05-08 NOTE — PATIENT INSTRUCTIONS
- Will refer to neuromuscular specialist Dr. Evan Huitron.    - Will likely need Nerve Conduction Study / EMG.    - Can also check if Dr. Christiano Biswas ([facility]) has availability.  - Continue symptomatic management of facial pain.

## 2019-05-09 ENCOUNTER — TELEPHONE (OUTPATIENT)
Dept: PHYSICAL MEDICINE AND REHAB | Facility: CLINIC | Age: 29
End: 2019-05-09

## 2019-05-09 ENCOUNTER — TELEPHONE (OUTPATIENT)
Dept: NEUROLOGY | Facility: CLINIC | Age: 29
End: 2019-05-09

## 2019-05-09 NOTE — TELEPHONE ENCOUNTER
Patient placed on wait list for sooner appt        ----- Message from Colin Briseno sent at 5/9/2019 11:29 AM CDT -----  Patient Requesting Sooner Appointment.     Reason for sooner appt.: pt said Dr. Driver advised pt see Dr. Chris rosales  When is the first available appointment? 07/18/19, pt currently scheduled  Communication Preference: [phone]  Additional Information:

## 2019-05-09 NOTE — TELEPHONE ENCOUNTER
I returned patient's call in regards to vascular neurologist. She wanted to inform Dr. Wright that she went to the appt. 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 I will send the message to Dr. Wright

## 2019-05-09 NOTE — TELEPHONE ENCOUNTER
----- Message from Brianne Calvert sent at 5/9/2019 11:33 AM CDT -----  Contact: PT  Needs Advice    Reason for call: Patient needs to speak to someone in regards to the diagnosis that the doctor gave her.        Communication Preference: [phone]    Additional Information:

## 2019-05-10 ENCOUNTER — TELEPHONE (OUTPATIENT)
Dept: PHYSICAL MEDICINE AND REHAB | Facility: CLINIC | Age: 29
End: 2019-05-10

## 2019-05-10 NOTE — TELEPHONE ENCOUNTER
Patient placed on wait list for sooner appointment.    ----- Message from Elias Mccormick sent at 5/10/2019 12:01 PM CDT -----  Contact: Patient @ [phone]  Patient requesting a sooner appt, due to the 5-8th visit, pls call

## 2019-05-10 NOTE — TELEPHONE ENCOUNTER
Patient received first available.  Patient added to wait list.      ----- Message from Shobha Perez MD sent at 5/9/2019  6:31 PM CDT -----  You can give her 1st available appointment and place her on a waiting list.  If she has a problem I can try to talk to her tomorrow.  ----- Message -----  From: Kamilla Madison MA  Sent: 5/9/2019   2:27 PM  To: Shobha Perez MD        ----- Message -----  From: Colin Briseno  Sent: 5/9/2019  11:29 AM  To: Chris ZAMARRIPA Staff    Patient Requesting Sooner Appointment.     Reason for sooner appt.: pt said Dr. Driver advised pt see Dr. Chris rosales  When is the first available appointment? 07/18/19, pt currently scheduled  Communication Preference: [phone]  Additional Information:

## 2019-05-13 ENCOUNTER — TELEPHONE (OUTPATIENT)
Dept: PHYSICAL MEDICINE AND REHAB | Facility: CLINIC | Age: 29
End: 2019-05-13

## 2019-05-13 NOTE — TELEPHONE ENCOUNTER
Per your chart message 05/10/19  Patient received first available appointment and placed on wait list.      ----- Message from Colin Briseno sent at 5/13/2019 11:08 AM CDT -----  Needs Advice    Reason for call: Pt is asking to speak w/ the doctor about seeing him earlier than 07/18/19, pt said the doctor is the only doctor that can order a test on her nerves. Pt states it's really important she speak w/ the doctor, pt said she'll need to go to the ED and wants to speak w/ the doctor before that happens.        Communication Preference: [phone]    Additional Information:

## 2019-05-14 ENCOUNTER — TELEPHONE (OUTPATIENT)
Dept: PHYSICAL MEDICINE AND REHAB | Facility: CLINIC | Age: 29
End: 2019-05-14

## 2019-05-14 DIAGNOSIS — G89.29 CHRONIC BILATERAL LOW BACK PAIN WITHOUT SCIATICA: ICD-10-CM

## 2019-05-14 DIAGNOSIS — M54.50 CHRONIC BILATERAL LOW BACK PAIN WITHOUT SCIATICA: ICD-10-CM

## 2019-05-14 PROBLEM — G50.0 TRIGEMINAL NEURALGIA: Status: ACTIVE | Noted: 2019-05-14

## 2019-05-14 PROBLEM — Q28.3 DEVELOPMENTAL VENOUS ANOMALY: Status: ACTIVE | Noted: 2019-05-14

## 2019-05-14 RX ORDER — CARBAMAZEPINE 100 MG/1
100 TABLET, CHEWABLE ORAL 2 TIMES DAILY
Qty: 90 TABLET | Refills: 1 | Status: SHIPPED | OUTPATIENT
Start: 2019-05-15 | End: 2019-07-24 | Stop reason: SDUPTHER

## 2019-05-14 RX ORDER — OXYCODONE AND ACETAMINOPHEN 10; 325 MG/1; MG/1
1 TABLET ORAL EVERY 6 HOURS PRN
Qty: 120 TABLET | Refills: 0 | Status: SHIPPED | OUTPATIENT
Start: 2019-05-15 | End: 2019-05-30 | Stop reason: SDUPTHER

## 2019-05-14 NOTE — TELEPHONE ENCOUNTER
Called the pt.  She just came from [facility] after treatment for UTI.  She said she was seen by Neurology for trigeminal neuralgia following dental procedures.  The doctor apparently suggested EMG testing but wanted her pain management specialist to order the test, as well as starting seizure medications (likely Tegretol) to help with the trigeminal neuralgia.  She needed a refill for her pain medications.  She is having a procedure on her back by Plastic surgery and may need to increase her dosage after the procedure.  I told her I will order Tegretol and Percocet.  I will send a message to the neurologist specializing in electrodiagnostic studies to check if he does testing for trigeminal nerves

## 2019-05-14 NOTE — TELEPHONE ENCOUNTER
----- Message from Colinnavin Briseno sent at 5/14/2019 12:42 PM CDT -----  Needs Advice     Reason for call: Pt states she understands she has the doctor's first available and she's been added to the wait list, but she is still requesting to speak w/ the doctor regarding her condition. Pt is asking for a call back today.        Communication Preference: [phone]     Additional Information:

## 2019-05-15 DIAGNOSIS — G50.0 TRIGEMINAL NEURALGIA: Primary | ICD-10-CM

## 2019-05-17 ENCOUNTER — TELEPHONE (OUTPATIENT)
Dept: UROGYNECOLOGY | Facility: CLINIC | Age: 29
End: 2019-05-17

## 2019-05-17 NOTE — TELEPHONE ENCOUNTER
Spoke to pt and scheduled pt appointment with Dr. Walker on 5/24 and informed her I'd send an appointment letter in the mail. Pt voiced understanding and call ended.

## 2019-05-17 NOTE — TELEPHONE ENCOUNTER
----- Message from Mere Rocha sent at 5/17/2019  1:47 PM CDT -----  Contact: PT   PT calling in to schedule an appointment with Dr Jayne VICKERS was unable to schedule   Hospital F/U from  Pt was discharged on 05/14/2019  PT can be reached at [phone].    Thanks

## 2019-05-24 ENCOUNTER — TELEPHONE (OUTPATIENT)
Dept: UROGYNECOLOGY | Facility: CLINIC | Age: 29
End: 2019-05-24

## 2019-05-24 NOTE — TELEPHONE ENCOUNTER
Pt states she has a virus. Rescheduled pt appointment per request and informed her I'd send the appointment letter in the mail.

## 2019-05-24 NOTE — TELEPHONE ENCOUNTER
----- Message from Tramaine Oden sent at 5/24/2019 11:00 AM CDT -----  Contact: self  Type:  Sooner Apoointment Request    Caller is requesting a sooner appointment.  Caller declined first available appointment listed below.  Caller will not accept being placed on the waitlist and is requesting a message be sent to doctor.  Name of Caller:Pt  When is the first available appointment?n/a  Symptoms:  Would the patient rather a call back or a response via [facility]? [employer]  Best Call Back Number: [phone]  Additional Information: Pt called in about wanting to cancel appt and rs appt

## 2019-05-24 NOTE — TELEPHONE ENCOUNTER
----- Message from Aaron Davis sent at 5/24/2019  3:50 PM CDT -----  Contact: pt  Name of Who is Calling: DUTCH GALINDO [[id number]]    What is the request in detail: Patient is requesting a call back regarding her appt on 6/6/19 pt would like and earlier appt date and time  that the nurse first stated to her which was the 6/3/19........Please contact to further discuss and advise      Can the clinic reply by [facility]: Yes    What Number [facility] Back [facility]:  [phone]

## 2019-05-30 ENCOUNTER — TELEPHONE (OUTPATIENT)
Dept: PHYSICAL MEDICINE AND REHAB | Facility: CLINIC | Age: 29
End: 2019-05-30

## 2019-05-30 DIAGNOSIS — M54.50 CHRONIC BILATERAL LOW BACK PAIN WITHOUT SCIATICA: ICD-10-CM

## 2019-05-30 DIAGNOSIS — G89.29 CHRONIC BILATERAL LOW BACK PAIN WITHOUT SCIATICA: ICD-10-CM

## 2019-05-30 RX ORDER — OXYCODONE AND ACETAMINOPHEN 10; 325 MG/1; MG/1
1 TABLET ORAL EVERY 4 HOURS PRN
Qty: 180 TABLET | Refills: 0 | Status: SHIPPED | OUTPATIENT
Start: 2019-06-01 | End: 2019-06-28 | Stop reason: SDUPTHER

## 2019-05-30 NOTE — TELEPHONE ENCOUNTER
----- Message from Ping Martinez sent at 5/30/2019  1:55 PM CDT -----  Contact: pt @ [phone]  Calling to inform Dr. Perez of her surgery date and has other topics to discuss with the doctor that are time sensitive . Please call.

## 2019-05-30 NOTE — TELEPHONE ENCOUNTER
Call the patient to get more information she stated she will just wait for you to call her, thanks

## 2019-05-31 ENCOUNTER — TELEPHONE (OUTPATIENT)
Dept: UROGYNECOLOGY | Facility: CLINIC | Age: 29
End: 2019-05-31

## 2019-05-31 ENCOUNTER — TELEPHONE (OUTPATIENT)
Dept: PHYSICAL MEDICINE AND REHAB | Facility: CLINIC | Age: 29
End: 2019-05-31

## 2019-05-31 NOTE — TELEPHONE ENCOUNTER
----- Message from Ronan Lobato sent at 5/31/2019 10:51 AM CDT -----  PLEASE CALL [facility] REGARDING A RX FOR THIS -[phone]

## 2019-05-31 NOTE — TELEPHONE ENCOUNTER
Spoke with pt and rescheduled her appointment to a sooner date due to pt being in pain, pt voiced understanding and call was ended.

## 2019-05-31 NOTE — TELEPHONE ENCOUNTER
Yvonne with Pharmacy called back.      She is concerned that the pt has gotten early refills 3mo in a row, and that this refill is 16d early with a dose increase.      Pt told Pharmacy that she is having surgery on Monday 06/03/2019 [told Dr Perez 06/01/2019] with Dr Walker [Dr Walker is an [facility] MD, no surgery scheduled in the system at all].  Pharmacy states they called Dr Walker's office, who said the pt is not scheduled for surgery.      In addition, if pt was taking the LR from 05/15/2019 q4h PRN it should have lasted 20d, but would be receiving the new prescription after 18d.

## 2019-05-31 NOTE — TELEPHONE ENCOUNTER
Spoke to pt's pharmacy who wanted to confirm if pt was due to have emergency surgery with  on Monday because pt was requesting to johana her pain medication 2 weeks early, pharmacy concern because this is not the pt's first attempt to receive medication before refill date, Advised pharmacy that pt is not scheduled for surgery and has not established care with  yet at [facility] pt is scheduled for a consult appointment on 6/6/19, she voiced understanding and call was ended.

## 2019-05-31 NOTE — TELEPHONE ENCOUNTER
Refill start date 06/01/19  Med Comments: refill was due 04/20/19,  Please refill today due to travel  Next refill due 05/2019      ----- Message from Elias Mccormick sent at 5/31/2019 10:53 AM CDT -----  Contact: [facility] @ [phone]  Caller requesting a return call to discuss the details of the early fill for (oxyCODONE-acetaminophen (PERCOCET)  mg per tablet ) pls advise       [facility] [place] - [address]  [address]  [place] [address]  Phone: [phone] Fax: [phone]

## 2019-05-31 NOTE — TELEPHONE ENCOUNTER
----- Message from Melinda Evansin sent at 5/31/2019 11:22 AM CDT -----  Contact: DUTCH GALINDO [[id number]]  Name of Who is Calling:  DUTCH GALINDO [[id number]]      What is the request in detail:   Patient called requesting a call as this pertains to rescheduling her appointment on Thursday 06/06/2019. I did attempt to reschedule per patient's request but was unsuccessful.      Please give a call back at your earliest convenience.      THANKS!    Can the clinic reply by [facility]:  no      What Number to Call Back :DUTCH GALINDO / # [phone]

## 2019-05-31 NOTE — TELEPHONE ENCOUNTER
I spoke to the patient.  She said she is going to see Urology on 06/01/2019 at which time she may need surgery.  She has been using more of the Percocet.  She was asking for it to be filled to more.  On my calculation, it should be due on 06/03/2019.  I called the pharmacist and she is okay to fill tomorrow if necessary.  She is going to talk to the patient.

## 2019-06-03 ENCOUNTER — TELEPHONE (OUTPATIENT)
Dept: UROGYNECOLOGY | Facility: CLINIC | Age: 29
End: 2019-06-03

## 2019-06-03 ENCOUNTER — INITIAL CONSULT (OUTPATIENT)
Dept: UROGYNECOLOGY | Facility: CLINIC | Age: 29
End: 2019-06-03
Payer: MEDICARE

## 2019-06-03 VITALS
WEIGHT: 153.25 LBS | DIASTOLIC BLOOD PRESSURE: 70 MMHG | BODY MASS INDEX: 27.15 KG/M2 | SYSTOLIC BLOOD PRESSURE: 100 MMHG | HEIGHT: 63 IN

## 2019-06-03 DIAGNOSIS — R10.2 PELVIC PAIN: ICD-10-CM

## 2019-06-03 DIAGNOSIS — N30.10 IC (INTERSTITIAL CYSTITIS): Primary | ICD-10-CM

## 2019-06-03 PROCEDURE — 99203 OFFICE O/P NEW LOW 30 MIN: CPT | Mod: S$PBB,,, | Performed by: OBSTETRICS & GYNECOLOGY

## 2019-06-03 PROCEDURE — 99999 PR PBB SHADOW E&M-EST. PATIENT-LVL III: CPT | Mod: PBBFAC,,, | Performed by: OBSTETRICS & GYNECOLOGY

## 2019-06-03 PROCEDURE — 99213 OFFICE O/P EST LOW 20 MIN: CPT | Mod: PBBFAC | Performed by: OBSTETRICS & GYNECOLOGY

## 2019-06-03 PROCEDURE — 99203 PR OFFICE/OUTPT VISIT, NEW, LEVL III, 30-44 MIN: ICD-10-PCS | Mod: S$PBB,,, | Performed by: OBSTETRICS & GYNECOLOGY

## 2019-06-03 PROCEDURE — 99999 PR PBB SHADOW E&M-EST. PATIENT-LVL III: ICD-10-PCS | Mod: PBBFAC,,, | Performed by: OBSTETRICS & GYNECOLOGY

## 2019-06-03 RX ORDER — PROMETHAZINE HYDROCHLORIDE 12.5 MG/1
TABLET ORAL
COMMUNITY
Start: 2015-08-09 | End: 2019-06-03

## 2019-06-03 RX ORDER — HYDROCODONE BITARTRATE AND ACETAMINOPHEN 5; 325 MG/1; MG/1
TABLET ORAL
COMMUNITY
End: 2019-06-03

## 2019-06-03 RX ORDER — GABAPENTIN 300 MG/1
CAPSULE ORAL
COMMUNITY
End: 2019-06-03

## 2019-06-03 RX ORDER — PANTOPRAZOLE SODIUM 40 MG/1
TABLET, DELAYED RELEASE ORAL
COMMUNITY
End: 2019-06-03

## 2019-06-03 RX ORDER — FLUTICASONE PROPIONATE 50 MCG
SPRAY, SUSPENSION (ML) NASAL
COMMUNITY
End: 2019-06-03

## 2019-06-03 RX ORDER — VALACYCLOVIR HYDROCHLORIDE 500 MG/1
TABLET, FILM COATED ORAL
COMMUNITY
End: 2019-06-03

## 2019-06-03 RX ORDER — OXYCODONE AND ACETAMINOPHEN 7.5; 325 MG/1; MG/1
TABLET ORAL
COMMUNITY
Start: 2017-08-14 | End: 2019-06-03

## 2019-06-03 RX ORDER — OXYCODONE AND ACETAMINOPHEN 10; 325 MG/1; MG/1
TABLET ORAL
COMMUNITY
End: 2019-06-03 | Stop reason: SDUPTHER

## 2019-06-03 RX ORDER — ETONOGESTREL AND ETHINYL ESTRADIOL .12; .015 MG/D; MG/D
INSERT, EXTENDED RELEASE VAGINAL
Refills: 6 | COMMUNITY
Start: 2019-05-28 | End: 2021-04-30 | Stop reason: SDUPTHER

## 2019-06-03 RX ORDER — PROMETHAZINE HYDROCHLORIDE 25 MG/1
25 TABLET ORAL EVERY 4 HOURS PRN
Refills: 2 | COMMUNITY
Start: 2019-05-29 | End: 2019-06-03 | Stop reason: SDUPTHER

## 2019-06-03 RX ORDER — FAMOTIDINE 20 MG/1
20 TABLET, FILM COATED ORAL
COMMUNITY
Start: 2016-07-25 | End: 2019-06-03

## 2019-06-03 RX ORDER — OXYCODONE AND ACETAMINOPHEN 5; 325 MG/1; MG/1
TABLET ORAL
COMMUNITY
End: 2019-06-03

## 2019-06-03 RX ORDER — OXYBUTYNIN CHLORIDE 5 MG/1
TABLET, EXTENDED RELEASE ORAL
Qty: 63 TABLET | Refills: 0 | Status: SHIPPED | OUTPATIENT
Start: 2019-06-03 | End: 2019-10-10

## 2019-06-03 RX ORDER — FLUCONAZOLE 150 MG/1
TABLET ORAL
COMMUNITY
End: 2019-06-03

## 2019-06-03 RX ORDER — PROMETHAZINE HYDROCHLORIDE 25 MG/1
TABLET ORAL
COMMUNITY
Start: 2018-12-30 | End: 2020-04-13

## 2019-06-03 RX ORDER — NYSTATIN AND TRIAMCINOLONE ACETONIDE 100000; 1 [USP'U]/G; MG/G
CREAM TOPICAL
COMMUNITY
End: 2019-06-03

## 2019-06-03 RX ORDER — FLAVOXATE HYDROCHLORIDE 100 MG/1
100 TABLET ORAL 3 TIMES DAILY PRN
Qty: 90 TABLET | Refills: 3 | Status: SHIPPED | OUTPATIENT
Start: 2019-06-03 | End: 2019-10-10

## 2019-06-03 NOTE — PROGRESS NOTES
Subjective:      Patient ID: Kendra Russell is a 28 y.o. female.    Chief Complaint:  interstitial cystitis      History of Present Illness  28-year-old  0 with longstanding history of interstitial cystitis as per patient.  Patient states 10 years ago she had a diagnosis made by Dr. Bailey  who apparently told her that there was was the worst IC patient ever seen.  Patient has been doing well for the last several years.  Treatment a log roll went as far as an InterStim device.  It has it was then recur removed.  Patient has been undergoing plastic surgery to have the scar revised.  Patient has felt as though she has had a urinary tract infection goals last several months.  Patient states she has the been going to the emergency room at [facility] and that in the past she has been administered 2 by Dr. Evan richey.  Patient now comes to us through the emergency room where she might have had another interstitial cystitis flare is requesting help.  In the past patient states that pretty mess helped but not Uribel but she has not taken that continuously.  Patient states that she has had bladder installations to some efficacy in the past.     Patient with urinary frequency as well as pelvic discomfort        Past Medical History:   Diagnosis Date    FHx: bowel obstruction     HA (headache)     Interstitial cystitis     Ovarian cyst     Ulcer        Past Surgical History:   Procedure Laterality Date    BACK SURGERY      had the scar from this surgery fixed recently    CHOLECYSTECTOMY N/A 2015    COLON SURGERY  2012    bowel obstruction    ESOPHAGOGASTRODUODENOSCOPY (EGD) N/A 10/9/2014    Performed by [facility]    MINI-LAPAROTOMY      NOSE SURGERY      RHINOPLASTY TIP      tracheal polyp removed      ULTRASOUND-ENDOSCOPIC-UPPER N/A 2016    Performed by John Cervantes MD at [facility] ENDO    UPPER GASTROINTESTINAL ENDOSCOPY  2017        GYN & OB History  Patient's last menstrual period was 05/10/2019.   Date of Last Pap: No result found    OB History    Para Term  AB Living   0 0 0 0 0 0   SAB TAB Ectopic Multiple Live Births   0 0 0 0 0       Health Maintenance       Date Due Completion Date    TETANUS VACCINE 2006 ---    Pap Smear with HPV Cotest 2019    Influenza Vaccine 2019 ---          Family History   Problem Relation Age of Onset    Diabetes Mother     Stroke Mother         51 yo    Diabetes Father     Hypertension Father     GI problems Father     Hyperlipidemia Father     Diabetes Maternal Aunt     Diabetes Maternal Uncle     Diabetes Maternal Grandmother     Diabetes Maternal Grandfather     Cancer Paternal Grandmother         bladder    Bladder Cancer Paternal Grandmother     Colon cancer Paternal Grandfather     Breast cancer Neg Hx     Heart disease Neg Hx     Ovarian cancer Neg Hx     Cervical cancer Neg Hx     Vaginal cancer Neg Hx     Endometrial cancer Neg Hx        Social History     Socioeconomic History    Marital status: Single     Spouse name: Not on file    Number of children: Not on file    Years of education: Not on file    Highest education level: Not on file   Occupational History    Not on file   Social Needs    Financial resource strain: Not on file    Food insecurity:     Worry: Not on file     Inability: Not on file    Transportation needs:     Medical: Not on file     Non-medical: Not on file   Tobacco Use    Smoking status: Never Smoker    Smokeless tobacco: Never Used   Substance and Sexual Activity    Alcohol use: No    Drug use: No    Sexual activity: Not Currently     Partners: Male     Birth control/protection: Other-see comments, None   Lifestyle    Physical activity:     Days per week: Not on file     Minutes per session: Not on file    Stress: Not on file   Relationships    Social connections:     Talks on phone: Not on file  "    Gets together: Not on file     Attends [employer] service: Not on file     Active member of club or organization: Not on file     Attends meetings of clubs or organizations: Not on file     Relationship status: Not on file   Other Topics Concern    Not on file   Social History Narrative    Not on file       Review of Systems  Review of Systems   Genitourinary: Positive for dysuria, pelvic pain and urgency. Negative for dyspareunia.          Objective:   /70 (BP Location: Right arm, Patient Position: Sitting, BP Method: Medium (Manual))   Ht 5' 3" (1.6 m)   Wt 69.5 kg (153 lb 3.5 oz)   LMP 05/10/2019   BMI 27.14 kg/m²     Physical Exam   Genitourinary: Pelvic exam was performed with patient supine. Vagina normal, uterus normal, cervix normal, skenes normal and bartholins normal. Right labia normal and left labia normal. Urethra exhibits no urethral caruncle, no urethral diverticulum, no urethral mass and no hypermobility. No tenderness, rectocele, cystocele or unspecified prolapse of vaginal walls in the vagina.   Empty cough stress test: Negative.  Kegel: 3/5    POP-Q deferred.     No evidence of trigonitis no urethritis on physical exam     Assessment:     1. IC (interstitial cystitis)    2. Pelvic pain            Plan:     1. IC (interstitial cystitis)    2. Pelvic pain        After physical exam we then presented to the office for direct face-to-face consultation.    I started by discussing the American urologic treatment a log rhythm for IC.  This my intent at this point to start with a Urogesic in combination with an oxybutynin titration if patient does not have success with the anticholinergic we will switch her to a beta 3 agonist.    Patient was requesting pain manage medication at this time it is not that is not indicated especially on my physical exam findings.  I was called away from my office upon my return patient had left.    Phone call to patient in electronic Medical Record went " unaswered    Time spent with the patient in a face-to-face discussion was 30 min in total with greater than 50% of time 20 min spent obtaining history as well as physical exam in starting with discussion.  I was not able to finish complete discussion with patient  I have asked nursing to document their observations of the visit as well.  Orders will be placed to the pharmacy should the patient decide to obtain the initial medications as discussed          Patient Instructions

## 2019-06-03 NOTE — TELEPHONE ENCOUNTER
Saw pt in the office today however when reviewing medication in the chart she was upset when various oxycodone-acetaminophen medications appeared on her medication list. Pt asked repeatedly where we got the list from. Pt was then seen for an exam by Dr. Walker and was asked to step in the office to discuss IC management options. When Dr. Walker stepped out she then asked if we were going to do surgery today. I informed her, If a scope was needed Dr. Walker will discuss that with her and she will be rescheduled for another day. Pt then stepped in Dr. Walker's office. Physical therapy then came to speak with Dr. Walker about another patient. After stepping out of the office pt left the office.

## 2019-06-28 ENCOUNTER — PATIENT MESSAGE (OUTPATIENT)
Dept: PHYSICAL MEDICINE AND REHAB | Facility: CLINIC | Age: 29
End: 2019-06-28

## 2019-06-28 DIAGNOSIS — G89.29 CHRONIC BILATERAL LOW BACK PAIN WITHOUT SCIATICA: ICD-10-CM

## 2019-06-28 DIAGNOSIS — M54.50 CHRONIC BILATERAL LOW BACK PAIN WITHOUT SCIATICA: ICD-10-CM

## 2019-06-28 RX ORDER — OXYCODONE AND ACETAMINOPHEN 10; 325 MG/1; MG/1
1 TABLET ORAL EVERY 4 HOURS PRN
Qty: 180 TABLET | Refills: 0 | Status: SHIPPED | OUTPATIENT
Start: 2019-07-01 | End: 2019-07-24 | Stop reason: SDUPTHER

## 2019-07-01 ENCOUNTER — PATIENT MESSAGE (OUTPATIENT)
Dept: PHYSICAL MEDICINE AND REHAB | Facility: CLINIC | Age: 29
End: 2019-07-01

## 2019-07-02 ENCOUNTER — TELEPHONE (OUTPATIENT)
Dept: PHYSICAL MEDICINE AND REHAB | Facility: CLINIC | Age: 29
End: 2019-07-02

## 2019-07-18 ENCOUNTER — TELEPHONE (OUTPATIENT)
Dept: PHYSICAL MEDICINE AND REHAB | Facility: CLINIC | Age: 29
End: 2019-07-18

## 2019-07-18 ENCOUNTER — PATIENT MESSAGE (OUTPATIENT)
Dept: PHYSICAL MEDICINE AND REHAB | Facility: CLINIC | Age: 29
End: 2019-07-18

## 2019-07-18 NOTE — TELEPHONE ENCOUNTER
patient scheduled and placed on wait list.    ----- Message from Colin Briseno sent at 7/18/2019 11:00 AM CDT -----  Patient Requesting Sooner Appointment.     Reason for sooner appt.: pt said she canceled appt scheduled today bc she completed surgery recently  When is the first available appointment? None available for the year  Communication Preference: [phone]  Additional Information:

## 2019-07-19 ENCOUNTER — PATIENT MESSAGE (OUTPATIENT)
Dept: PHYSICAL MEDICINE AND REHAB | Facility: CLINIC | Age: 29
End: 2019-07-19

## 2019-07-19 ENCOUNTER — TELEPHONE (OUTPATIENT)
Dept: PHYSICAL MEDICINE AND REHAB | Facility: CLINIC | Age: 29
End: 2019-07-19

## 2019-07-19 NOTE — TELEPHONE ENCOUNTER
Called patient no answer.    ----- Message from Elias Mccormick sent at 7/19/2019  4:41 PM CDT -----  Contact: Patient @ [phone]  Patient returning a missed call, pls return call

## 2019-07-19 NOTE — TELEPHONE ENCOUNTER
responding to patient threw [facility] message.    ----- Message from Elias Mccormick sent at 7/19/2019  4:41 PM CDT -----  Contact: Patient @ [phone]  Patient returning a missed call, pls return call

## 2019-07-24 ENCOUNTER — PATIENT MESSAGE (OUTPATIENT)
Dept: PHYSICAL MEDICINE AND REHAB | Facility: CLINIC | Age: 29
End: 2019-07-24

## 2019-07-24 ENCOUNTER — TELEPHONE (OUTPATIENT)
Dept: PHYSICAL MEDICINE AND REHAB | Facility: CLINIC | Age: 29
End: 2019-07-24

## 2019-07-24 DIAGNOSIS — G89.29 CHRONIC BILATERAL LOW BACK PAIN WITHOUT SCIATICA: ICD-10-CM

## 2019-07-24 DIAGNOSIS — M54.50 CHRONIC BILATERAL LOW BACK PAIN WITHOUT SCIATICA: ICD-10-CM

## 2019-07-24 RX ORDER — CARBAMAZEPINE 100 MG/1
TABLET, CHEWABLE ORAL
Qty: 90 TABLET | Refills: 1 | Status: SHIPPED | OUTPATIENT
Start: 2019-07-24 | End: 2019-11-20 | Stop reason: SDUPTHER

## 2019-07-24 RX ORDER — OXYCODONE AND ACETAMINOPHEN 10; 325 MG/1; MG/1
1 TABLET ORAL EVERY 4 HOURS PRN
Qty: 180 TABLET | Refills: 0 | Status: SHIPPED | OUTPATIENT
Start: 2019-07-30 | End: 2019-08-29 | Stop reason: SDUPTHER

## 2019-07-24 NOTE — TELEPHONE ENCOUNTER
Message forward to the Nurse.        ----- Message from Noel Carroll sent at 7/24/2019 12:25 PM CDT -----  Contact: pt   PT missed a call and would the nurse to return their call     Pt can be reached at [phone]

## 2019-08-16 ENCOUNTER — OFFICE VISIT (OUTPATIENT)
Dept: PHYSICAL MEDICINE AND REHAB | Facility: CLINIC | Age: 29
End: 2019-08-16
Payer: MEDICARE

## 2019-08-16 VITALS
SYSTOLIC BLOOD PRESSURE: 127 MMHG | HEART RATE: 101 BPM | BODY MASS INDEX: 25.78 KG/M2 | DIASTOLIC BLOOD PRESSURE: 90 MMHG | WEIGHT: 145.5 LBS | HEIGHT: 63 IN

## 2019-08-16 DIAGNOSIS — M54.42 CHRONIC BILATERAL LOW BACK PAIN WITH LEFT-SIDED SCIATICA: Primary | ICD-10-CM

## 2019-08-16 DIAGNOSIS — Z79.891 CHRONIC USE OF OPIATE FOR THERAPEUTIC PURPOSE: ICD-10-CM

## 2019-08-16 DIAGNOSIS — M26.609 TMJ DYSFUNCTION: ICD-10-CM

## 2019-08-16 DIAGNOSIS — G89.29 CHRONIC BILATERAL LOW BACK PAIN WITH LEFT-SIDED SCIATICA: Primary | ICD-10-CM

## 2019-08-16 PROCEDURE — 99999 PR PBB SHADOW E&M-EST. PATIENT-LVL III: ICD-10-PCS | Mod: PBBFAC,,, | Performed by: PHYSICAL MEDICINE & REHABILITATION

## 2019-08-16 PROCEDURE — 99213 OFFICE O/P EST LOW 20 MIN: CPT | Mod: PBBFAC | Performed by: PHYSICAL MEDICINE & REHABILITATION

## 2019-08-16 PROCEDURE — 99999 PR PBB SHADOW E&M-EST. PATIENT-LVL III: CPT | Mod: PBBFAC,,, | Performed by: PHYSICAL MEDICINE & REHABILITATION

## 2019-08-16 PROCEDURE — 99214 PR OFFICE/OUTPT VISIT, EST, LEVL IV, 30-39 MIN: ICD-10-PCS | Mod: S$PBB,,, | Performed by: PHYSICAL MEDICINE & REHABILITATION

## 2019-08-16 PROCEDURE — 99214 OFFICE O/P EST MOD 30 MIN: CPT | Mod: S$PBB,,, | Performed by: PHYSICAL MEDICINE & REHABILITATION

## 2019-08-16 NOTE — PROGRESS NOTES
"Subjective:       Patient ID: Kendra Russell is a 29 y.o. female.    Chief Complaint: No chief complaint on file.    HPI     HISTORY OF PRESENT ILLNESS:  Mrs. Russell is a 29-year-old white female who is followed up [facility] for chronic low back pain.  Her past medical history is significant for interstitial cystitis, status post bladder stimulator in 2010 and subsequent removal, colon surgery in 2012 for bowel obstruction, TMJ dysfunction and trigeminal neuralgia.  Her last visit to the clinic was on 03/05/2019.  She was maintained on p.r.n. Oxycodone/APAP.    The patient has come to the clinic for followup.  Since her last visit, she has been followed up by Urology at [facility].  In 06/2019, she underwent surgery for interstitial cystitis.  In 07/2019, she underwent plastic surgery due to scar tissue in her previous surgical site.  Her her low back pain got worse and her pain medications were consequently increased.    The patient continues to complain of back pain.  It is a deep sensation of shattering her tailbone".  Over the past couple months, she has been having intermittent shooting pain to the left foot with tingling sensations.  Her pain is aggravated with prolonged standing, bending and lifting.  It is better with alternating heat or ice, lying down and pain medications.  Her maximum pain is 10/10 and minimum 3 to 4/10.  Today it is 6/10.  The patient complains of left lower extremity weakness.  She denies any bowel or bladder incontinence although she does intermittent bladder catheterization program due to occasional retention.    As noted previous visit, the patient is allergic/intolerant to multiple medications.  She is currently taking oxycodone/APAP 10/325 p.r.n. 6 times per day.  She has been trying to exercise regularly using a stationary bicycle.          Review of Systems   Constitutional: Positive for fatigue and fever. Negative for chills.   HENT: " Negative for trouble swallowing.    Eyes: Positive for visual disturbance.   Respiratory: Negative for shortness of breath.    Cardiovascular: Negative for chest pain.   Gastrointestinal: Negative for blood in stool, constipation, nausea and vomiting.   Genitourinary: Positive for difficulty urinating.   Musculoskeletal: Positive for back pain and neck pain. Negative for arthralgias, gait problem and joint swelling.   Neurological: Positive for dizziness. Negative for headaches.   Psychiatric/Behavioral: Negative for behavioral problems and sleep disturbance.       Objective:      Physical Exam   Constitutional: She is oriented to person, place, and time. She appears well-developed and well-nourished.   HENT:   Head: Normocephalic and atraumatic.   Neck: Normal range of motion.   Mild tenderness.   Musculoskeletal:   BUE:  ROM:full.  Strength:    RUE: 5/5 at shoulder abduction, 5 elbow flexion, 5 elbow extension, 5 hand .   LUE: 5/5 at shoulder abduction, 5 elbow flexion, 5 elbow extension, 5 hand .  Sensation to pinprick:   RUE: intact.   LUE: intact.  DTR:    RUE: +2 biceps, +1 triceps.   LUE:  +2 biceps, +1 triceps.      BLE:  ROM:   RLE: full.   LLE: full.  Knee crepitus:   RLE: -ve.   LLE: -ve.   Strength:    RLE: 5/5 at hip flexion, 5 knee extension, 5 ankle DF, 5 PF, 5 EHL.   LLE: 5/5 at hip flexion, 5 knee extension, 5 ankle DF, 5 PF, 5 EHL.  Sensation to pinprick:     RLE: intact.      LLE: intact.   DTR:     RLE: +2 knee, +2 ankle.    LLE: +2 knee, +2 ankle.  Clonus:    Rt ankle: -ve.    Lt ankle: -ve.  SLR:      RLE: -ve at 60 degrees.      LLE: -ve at 60 degrees.   TITA:     RLE: -ve.      LLE: -ve.  SI tenderness:     RLE: -ve.      LLE: -ve.    Healed scar over Rt buttock.    +ve mild tenderness over low lumbar spine.    Gait: WNL     Neurological: She is alert and oriented to person, place, and time.   Skin: Skin is warm.   Psychiatric: She has a normal mood and affect. Her behavior is  normal.   Vitals reviewed.          Assessment:       1. Chronic bilateral low back pain with left-sided sciatica    2. TMJ dysfunction    3. Chronic use of opiate for therapeutic purpose        Plan:       - MRI of lumbar spine was ordered (an open MRI was requested outside [facility] due to the patient's claustrophobia).  - Continue oxyCODONE-acetaminophen (PERCOCET)  mg per tablet; Take 1 tablet by mouth every 4 hours as needed for Pain.  Plan to decrease her maximum 5 times per day at next refill time.  - Pain Clinic Drug Screen; Future  - Regular home exercise program was encouraged.  - Follow up in about 4 months (around 12/16/2019).     This was a 25 minute visit, more than 50% of which was spent counseling the patient about the diagnosis and the treatment plan.

## 2019-08-22 ENCOUNTER — TELEPHONE (OUTPATIENT)
Dept: NEUROLOGY | Facility: CLINIC | Age: 29
End: 2019-08-22

## 2019-08-22 NOTE — TELEPHONE ENCOUNTER
----- Message from Yvonne Asencio RN sent at 8/22/2019  3:46 PM CDT -----  Contact: pt       ----- Message -----  From: Noel Carroll  Sent: 8/22/2019   2:26 PM  To: [facility] Staff    Pt would like a call back regarding scheduling np appointment no dates in Epic referral in the system         Pt can be reached at [phone]

## 2019-08-23 ENCOUNTER — PATIENT MESSAGE (OUTPATIENT)
Dept: PHYSICAL MEDICINE AND REHAB | Facility: CLINIC | Age: 29
End: 2019-08-23

## 2019-08-23 DIAGNOSIS — G89.29 CHRONIC BILATERAL LOW BACK PAIN WITHOUT SCIATICA: ICD-10-CM

## 2019-08-23 DIAGNOSIS — M54.50 CHRONIC BILATERAL LOW BACK PAIN WITHOUT SCIATICA: ICD-10-CM

## 2019-08-23 RX ORDER — OXYCODONE AND ACETAMINOPHEN 10; 325 MG/1; MG/1
TABLET ORAL
Qty: 180 TABLET | OUTPATIENT
Start: 2019-08-23

## 2019-08-28 NOTE — TELEPHONE ENCOUNTER
I talked to the patient.  I told her that 2 options at this point is continuing with oxycodone/APAP 10/325 6 times per day, or switching to oxycodone (without Tylenol) 15 mg tablets 4 times per day.  It will be the same total daily dose oxycodone.  The patient was interested in trying it for few days.  I told her she can check with her insurance to see if getting a few day supply does not interfere with her ability to get a month supply later.  She said she would let me know tomorrow.

## 2019-08-29 ENCOUNTER — PATIENT MESSAGE (OUTPATIENT)
Dept: PHYSICAL MEDICINE AND REHAB | Facility: CLINIC | Age: 29
End: 2019-08-29

## 2019-08-29 DIAGNOSIS — G89.29 CHRONIC BILATERAL LOW BACK PAIN WITHOUT SCIATICA: ICD-10-CM

## 2019-08-29 DIAGNOSIS — M54.50 CHRONIC BILATERAL LOW BACK PAIN WITHOUT SCIATICA: ICD-10-CM

## 2019-08-29 RX ORDER — OXYCODONE AND ACETAMINOPHEN 10; 325 MG/1; MG/1
1 TABLET ORAL
Qty: 150 TABLET | Refills: 0 | Status: SHIPPED | OUTPATIENT
Start: 2019-08-29 | End: 2019-09-23 | Stop reason: SDUPTHER

## 2019-09-17 ENCOUNTER — TELEPHONE (OUTPATIENT)
Dept: VASCULAR SURGERY | Facility: CLINIC | Age: 29
End: 2019-09-17

## 2019-09-17 NOTE — TELEPHONE ENCOUNTER
----- Message from Papa Gong sent at 9/17/2019  2:07 PM CDT -----  Contact: Pt  Type:  Needs Medical Advice    Who Called: The Pt states that she was a former Pt of Dr. Kelly Calvert.  She also states that she has been sending messages for a month now to try to get in to the first available doctor.  Please contact the Pt asap.      Best Call Back Number: [phone]    Additional Information:  The Pt is very frustrated and would like a call asap.

## 2019-09-17 NOTE — TELEPHONE ENCOUNTER
I spoke to the pt she was upset because she was calling a number for Dr. Sandy that was no longer good and when give the correct number she calm down. Appt was scheduled with [facility].

## 2019-09-22 ENCOUNTER — PATIENT MESSAGE (OUTPATIENT)
Dept: PHYSICAL MEDICINE AND REHAB | Facility: CLINIC | Age: 29
End: 2019-09-22

## 2019-09-22 DIAGNOSIS — G89.29 CHRONIC BILATERAL LOW BACK PAIN WITHOUT SCIATICA: ICD-10-CM

## 2019-09-22 DIAGNOSIS — M54.50 CHRONIC BILATERAL LOW BACK PAIN WITHOUT SCIATICA: ICD-10-CM

## 2019-09-23 RX ORDER — OXYCODONE AND ACETAMINOPHEN 10; 325 MG/1; MG/1
1 TABLET ORAL
Qty: 150 TABLET | Refills: 0 | Status: SHIPPED | OUTPATIENT
Start: 2019-09-25 | End: 2019-10-25 | Stop reason: SDUPTHER

## 2019-09-24 ENCOUNTER — TELEPHONE (OUTPATIENT)
Dept: PHYSICAL MEDICINE AND REHAB | Facility: CLINIC | Age: 29
End: 2019-09-24

## 2019-09-24 ENCOUNTER — PATIENT MESSAGE (OUTPATIENT)
Dept: PHYSICAL MEDICINE AND REHAB | Facility: CLINIC | Age: 29
End: 2019-09-24

## 2019-09-24 NOTE — TELEPHONE ENCOUNTER
Dx:M54.42      Chronic bilateral low back pain with left-sided sciatica      ----- Message from Lilli Rivera sent at 9/24/2019  2:46 PM CDT -----  Contact: [facility]    [phone]  Calling for pts diagnosis for oxycodone 10/325.      [facility] [place] - [address] [phone] (Phone)                     [phone] (Fax)

## 2019-10-09 ENCOUNTER — TELEPHONE (OUTPATIENT)
Dept: NEUROLOGY | Facility: CLINIC | Age: 29
End: 2019-10-09

## 2019-10-09 NOTE — TELEPHONE ENCOUNTER
----- Message from Karyn Noel RN sent at 10/9/2019  2:50 PM CDT -----  Contact: [phone]/self  Lisa-  Do you think she can see Luiz Barone?  Karyn  ----- Message -----  From: Elizabeth Mendoza  Sent: 10/9/2019   2:03 PM CDT  To: [facility] Staff    Type:  Sooner Apoointment Request    Caller is requesting a sooner appointment.  Caller declined first available appointment listed below.  Caller will not accept being placed on the waitlist and is requesting a message be sent to doctor.  Name of Caller: self  When is the first available appointment? none  Symptoms: New patient with Cons./Trigeminal neuralgia/Driver  Would the patient rather a call back or a response via [facility]?  call  Best Call Back Number:   Additional Information:

## 2019-10-10 ENCOUNTER — INITIAL CONSULT (OUTPATIENT)
Dept: CARDIOLOGY | Facility: CLINIC | Age: 29
End: 2019-10-10
Payer: MEDICARE

## 2019-10-10 VITALS
WEIGHT: 153.69 LBS | HEIGHT: 63 IN | BODY MASS INDEX: 27.23 KG/M2 | SYSTOLIC BLOOD PRESSURE: 121 MMHG | DIASTOLIC BLOOD PRESSURE: 83 MMHG | HEART RATE: 95 BPM

## 2019-10-10 DIAGNOSIS — M54.2 NECK PAIN: ICD-10-CM

## 2019-10-10 DIAGNOSIS — M79.662 PAIN IN BOTH LOWER LEGS: ICD-10-CM

## 2019-10-10 DIAGNOSIS — M79.661 PAIN IN BOTH LOWER LEGS: ICD-10-CM

## 2019-10-10 DIAGNOSIS — M79.601 PAIN IN BOTH UPPER EXTREMITIES: ICD-10-CM

## 2019-10-10 DIAGNOSIS — M79.602 PAIN IN BOTH UPPER EXTREMITIES: ICD-10-CM

## 2019-10-10 DIAGNOSIS — I83.813 VARICOSE VEINS OF BOTH LOWER EXTREMITIES WITH PAIN: Primary | ICD-10-CM

## 2019-10-10 PROBLEM — M79.603 ARM PAIN: Status: ACTIVE | Noted: 2019-10-10

## 2019-10-10 PROBLEM — I83.93 VARICOSE VEINS OF BOTH LOWER EXTREMITIES: Status: ACTIVE | Noted: 2019-10-10

## 2019-10-10 PROCEDURE — 99999 PR PBB SHADOW E&M-EST. PATIENT-LVL IV: CPT | Mod: PBBFAC,,, | Performed by: INTERNAL MEDICINE

## 2019-10-10 PROCEDURE — 99205 PR OFFICE/OUTPT VISIT, NEW, LEVL V, 60-74 MIN: ICD-10-PCS | Mod: S$PBB,,, | Performed by: INTERNAL MEDICINE

## 2019-10-10 PROCEDURE — 99999 PR PBB SHADOW E&M-EST. PATIENT-LVL IV: ICD-10-PCS | Mod: PBBFAC,,, | Performed by: INTERNAL MEDICINE

## 2019-10-10 PROCEDURE — 99205 OFFICE O/P NEW HI 60 MIN: CPT | Mod: S$PBB,,, | Performed by: INTERNAL MEDICINE

## 2019-10-10 PROCEDURE — 99214 OFFICE O/P EST MOD 30 MIN: CPT | Mod: PBBFAC | Performed by: INTERNAL MEDICINE

## 2019-10-10 NOTE — LETTER
October 10, 2019      John Melo MD  [address]  [place] [address]           [facility]  [address]  [place] [address]  Phone: [phone]          Patient: Kendra Russell   MR Number: [id number]   YOB: 1990   Date of Visit: 10/10/2019       Dear Dr. John Melo:    Thank you for referring Kendra Russell to me for evaluation. Attached you will find relevant portions of my assessment and plan of care.    If you have questions, please do not hesitate to call me. I look forward to following Kendra Russell along with you.    Sincerely,    Junaid Manuel MD PhD    Enclosure  CC:  No Recipients    If you would like to receive this communication electronically, please contact [email or web address] or [phone] to request more information on [employer] access.    For providers and/or their staff who would like to refer a patient to [facility], please contact us through our one-stop-shop provider referral line, [facility] , at [phone].    If you feel you have received this communication in error or would no longer like to receive these types of communications, please e-mail [email or web address]

## 2019-10-10 NOTE — PROGRESS NOTES
"[facility]      Chief Complaint   Patient presents with    Varicose veins of left lower extremity with inflammation       Patient ID: Kendra Russell is a 29 y.o. female with a past medical history of interstitial cystitis, multiple surgeries, who presents for an initial appointment.  Pertinent history/events are as follows:     -Pt kindly referred by Dr. Melo for evaluation of varicose veins.     HPI:  Mrs. Russell reports being diagnosed with varicose veins in 2017.  She reports "development of multiple spider veins at an extremely fast rate over the past year."  Also reports some BLE edema.  Pt states she "feels pain in my veins".  Exam shows few small scattered varicose veins on both legs. No significant veins seen at neck or arms.  BLE Venous Reflux Study from 3/10/2017 showed no reflux and no DVT.     Past Medical History:   Diagnosis Date    FHx: bowel obstruction     HA (headache)     Interstitial cystitis     Ovarian cyst     Ulcer      Past Surgical History:   Procedure Laterality Date    BACK SURGERY  2010    had the scar from this surgery fixed recently    CHOLECYSTECTOMY N/A 05/2015    COLON SURGERY  2012    bowel obstruction    MINI-LAPAROTOMY      NOSE SURGERY      RHINOPLASTY TIP      tracheal polyp removed      UPPER GASTROINTESTINAL ENDOSCOPY  09/2017     Social History     Socioeconomic History    Marital status: Single     Spouse name: Not on file    Number of children: Not on file    Years of education: Not on file    Highest education level: Not on file   Occupational History    Not on file   Social Needs    Financial resource strain: Not on file    Food insecurity:     Worry: Not on file     Inability: Not on file    Transportation needs:     Medical: Not on file     Non-medical: Not on file   Tobacco Use    Smoking status: Never Smoker    Smokeless tobacco: Never Used   Substance and Sexual Activity    Alcohol use: No    Drug use: No    Sexual activity: " Not Currently     Partners: Male     Birth control/protection: Other-see comments, None   Lifestyle    Physical activity:     Days per week: Not on file     Minutes per session: Not on file    Stress: Not on file   Relationships    Social connections:     Talks on phone: Not on file     Gets together: Not on file     Attends Cheondo[employer] service: Not on file     Active member of club or organization: Not on file     Attends meetings of clubs or organizations: Not on file     Relationship status: Not on file   Other Topics Concern    Not on file   Social History Narrative    Not on file     Family History   Problem Relation Age of Onset    Diabetes Mother     Stroke Mother         53 yo    Diabetes Father     Hypertension Father     GI problems Father     Hyperlipidemia Father     Diabetes Maternal Aunt     Diabetes Maternal Uncle     Diabetes Maternal Grandmother     Diabetes Maternal Grandfather     Cancer Paternal Grandmother         bladder    Bladder Cancer Paternal Grandmother     Colon cancer Paternal Grandfather     Breast cancer Neg Hx     Heart disease Neg Hx     Ovarian cancer Neg Hx     Cervical cancer Neg Hx     Vaginal cancer Neg Hx     Endometrial cancer Neg Hx        Review of patient's allergies indicates:   Allergen Reactions    Ciprofloxacin hcl     Flagyl [metronidazole hcl]     Hydrocodone-acetaminophen      Bad migraines; dizzy, nausea    Metoclopramide      Other reaction(s): Rash, Unspecified    Peanut     Pylera [bismuth subcit [id number]] Nausea And Vomiting and Other (See Comments)     Pt reports dizziness with Pylera    Toradol [ketorolac]     Zofran [ondansetron hcl (pf)] Other (See Comments)     Pt reports migraine with Zofran    Adhesive Rash     Plastic tape    Avelox [moxifloxacin] Nausea And Vomiting    Compazine [prochlorperazine] Rash    Demerol [meperidine] Nausea And Vomiting    Neurontin [gabapentin] Nausea And Vomiting    Ultram  "[tramadol] Nausea And Vomiting       Medication List with Changes/Refills   Current Medications    CARBAMAZEPINE (TEGRETOL) 100 MG CHEWABLE TABLET    TAKE ONE TABLET BY MOUTH TWICE DAILY. IN 1-2 DAYS. if no RELIEF MAY increase DOSE TO THREE TIMES DAILY    CYANOCOBALAMIN, VITAMIN B-12, (VITAMIN B-12) 5,000 MCG/ML DROP    Take by mouth once daily.     NUVARING 0.12-0.015 MG/24 HR VAGINAL RING    AS DIRECTED    OXYCODONE-ACETAMINOPHEN (PERCOCET)  MG PER TABLET    Take 1 tablet by mouth every 4 to 6 hours as needed for Pain (maximum 5 tablets per day).    PROMETHAZINE (PHENERGAN) 25 MG TABLET    promethazine 25 mg tablet   TAKE one tablet BY MOUTH EVERY FOUR hours AS NEEDED   Discontinued Medications    ETONOGESTREL/ETHINYL ESTRADIOL (NUVARING VAGL)    Place vaginally.    FLAVOXATE (URISPAS) 100 MG TAB    Take 1 tablet (100 mg total) by mouth 3 (three) times daily as needed.    OXYBUTYNIN (DITROPAN-XL) 5 MG TR24    Take 1 tablet (5 mg total) by mouth once daily for 7 days, THEN 2 tablets (10 mg total) once daily for 7 days, THEN 3 tablets (15 mg total) once daily for 14 days. Only increase if needed.       Review of Systems  Constitution: Denies chills, fever, and sweats.  HENT: Denies headaches or blurry vision.  Cardiovascular: Denies chest pain or irregular heart beat.  Respiratory: Denies cough or shortness of breath.  Gastrointestinal: Denies abdominal pain, nausea, or vomiting.  Musculoskeletal: Positive for pain in neck, legs and arm veins.  Neurological: Denies dizziness or focal weakness.  Psychiatric/Behavioral: Normal mental status.  Hematologic/Lymphatic: Denies bleeding problem or easy bruising/bleeding.  Skin: Denies rash or suspicious lesions    Physical Examination  /83 (BP Location: Left arm, Patient Position: Sitting, BP Method: Large (Automatic))   Pulse 95   Ht 5' 3" (1.6 m)   Wt 69.7 kg (153 lb 10.6 oz)   BMI 27.22 kg/m²     Constitutional: No acute distress, conversant  HEENT: " Sclera anicteric, Pupils equal, round and reactive to light, extraocular motions intact, Oropharynx clear  Neck: No JVD, no carotid bruits  Cardiovascular: regular rate and rhythm, no murmur, rubs or gallops, normal S1/S2  Pulmonary: Clear to auscultation bilaterally  Abdominal: Abdomen soft, nontender, nondistended, positive bowel sounds  Extremities: No lower extremity edema, few small scattered varicose veins on both legs. No significant veins seen at neck or arms.      Pulses:  Carotid pulses are 2+ on the right side, and 2+ on the left side.  Radial pulses are 2+ on the right side, and 2+ on the left side.   Femoral pulses are 2+ on the right side, and 2+ on the left side.  Popliteal pulses are 2+ on the right side, and 2+ on the left side.   Dorsalis pedis pulses are 2+ on the right side, and 2+ on the left side.   Posterior tibial pulses are 2+ on the right side, and 2+ on the left side.    Skin: No ecchymosis, erythema, or ulcers  Psych: Alert and oriented x 3, appropriate affect  Neuro: CNII-XII intact, no focal deficits    Labs:  Most Recent Data  CBC:   Lab Results   Component Value Date    WBC 10.02 10/10/2017    HGB 14.4 10/10/2017    HCT 40.7 10/10/2017     10/10/2017    MCV 90 10/10/2017    RDW 11.9 10/10/2017     BMP:   Lab Results   Component Value Date     10/10/2017    K 3.6 10/10/2017     10/10/2017    CO2 21 (L) 10/10/2017    BUN 10 10/10/2017    CREATININE 0.8 10/10/2017    GLU 86 10/10/2017    CALCIUM 9.9 10/10/2017    MG 2.2 03/15/2017     LFTS;   Lab Results   Component Value Date    PROT 8.7 (H) 10/10/2017    ALBUMIN 4.7 10/10/2017    BILITOT 1.1 (H) 10/10/2017    AST 19 10/10/2017    ALKPHOS 84 10/10/2017    ALT 14 10/10/2017     COAGS:   Lab Results   Component Value Date    INR 1.0 03/15/2017     FLP: No results found for: CHOL, HDL, LDLCALC, TRIG, CHOLHDL  CARDIAC: No results found for: TROPONINI, CKMB, BNP    BLE Venous Reflux Study 3/10/2017:  Impression:   Right  Leg:  Color flow evaluation of the lower extremity demonstrates fully compressible and patent veins. There is no evidence of venous thrombosis in the deep or superficial veins. No significant reflux noted in the GSV including the saphenofemoral   junction (SFJ). No reflux noted in the SSV or accessory saphenous vein.     Left Leg:  Color flow evaluation of the lower extremity demonstrates fully compressible and patent veins. There is no evidence of venous thrombosis in the deep or superficial veins. No significant reflux noted in the GSV including the saphenofemoral   junction (SFJ). No reflux noted in the SSV or accessory saphenous vein.    Assessment/Plan:   Kendra Russell is a 29 y.o. female with a past medical history of interstitial cystitis, multiple surgeries, who presents for an initial appointment.    1. Varicose Veins- Pt presents with multiple complaints of pain and edema in veins of arm, neck and legs.  Etiology unclear.  Exam shows few small scattered varicose veins on both legs. No significant veins seen at neck or arms.  Exam of varicose veins is not in proportion with pt's symptoms.   Check BLE venous reflux study and BUE venous ultrasound to evaluate further.  Pt to elevated legs when resting.  Refer to Neurology and Rheumatology for evaluation.     Follow up in 1 month    Total duration of face to face visit time 30 minutes.  Total time spent counseling greater than fifty percent of total visit time.  Counseling included discussion regarding imaging findings, diagnosis, possibilities, treatment options, risks and benefits.  The patient had many questions regarding the options and long-term effects.    Junaid Manuel MD, PhD  Interventional Cardiology

## 2019-10-10 NOTE — PATIENT INSTRUCTIONS
Assessment/Plan:   Kendra Russell is a 29 y.o. female with a past medical history of interstitial cystitis, multiple surgeries, who presents for an initial appointment.    1. Varicose Veins- Pt presents with multiple complaints of pain and edema in veins of arm, neck and legs.  Etiology unclear.  Exam shows few small scattered varicose veins on both legs. No significant veins seen at neck or arms.  Exam of varicose veins is not in proportion with pt's symptoms.   Check BLE venous reflux study and BUE venous ultrasound to evaluate further.  Pt to elevated legs when resting.  Refer to Neurology and Rheumatology for evaluation.     Follow up in 1 month

## 2019-10-14 ENCOUNTER — PATIENT MESSAGE (OUTPATIENT)
Dept: PHYSICAL MEDICINE AND REHAB | Facility: CLINIC | Age: 29
End: 2019-10-14

## 2019-10-14 ENCOUNTER — TELEPHONE (OUTPATIENT)
Dept: PHYSICAL MEDICINE AND REHAB | Facility: CLINIC | Age: 29
End: 2019-10-14

## 2019-10-14 RX ORDER — OXYCODONE HYDROCHLORIDE 10 MG/1
10 TABLET ORAL EVERY 4 HOURS PRN
Qty: 18 TABLET | Refills: 0 | Status: SHIPPED | OUTPATIENT
Start: 2019-10-23 | End: 2019-10-15

## 2019-10-14 NOTE — TELEPHONE ENCOUNTER
Ping Martinez   Mon 10/14/2019 9:41 AM   Kamilla Bender  ?   Ping Martinez 9:37 AM:   santo Kohli, I have Kendra Russell mrn [phone] on the phone she has nothing nice to say about you, but is asking to speak with someone regarding a medication that was to be a 3 day trial prescribed by Dr. Perez

## 2019-10-14 NOTE — TELEPHONE ENCOUNTER
Called and spoke with patient.  Patient was very rude and starting attacking my job ability.  I informed the patient that  each message sent goes directly to the Doctor.  Patient continue to become rude and speak over me.  I informed patient again of the process of sending and receiving messages.  Patient decided to discuss a problem that was resolved  on 07/19.  This was verbaly resolved once patient came to the visit on 08/19.  Patient did not allow me to speak.  Patient wanted to continue to discuss a message from 07/2019.  Patient was verbaly abusive and I informed her that I was hanging up the Phone and I did.      ----- Message from Ping Martinez sent at 10/14/2019  9:44 AM CDT -----  Contact: pt @ [phone]  Patient is asking to speak with Dr. Perez, regarding the 3 day trail of medication: oxycodone 15 mg tablets. Says she has been calling, leaving messages and no return calls. Please call patient prior to sending prescription to the pharmacy.

## 2019-10-14 NOTE — TELEPHONE ENCOUNTER
I called the patient.  We discussed previously a trial of changing her pain medications from Percocet 10/325 6 times per day to oxycodone 15 mg tablets 4 times per day.  She has some problem getting the message through our office recently at this point, he wants to have 3 date trial of oxycodone 10 mg tablets 6 times per day before transitioning to the 50 mg tablets.  I agreed and sent a refill to her pharmacy, dated 10/23/2019.

## 2019-10-15 ENCOUNTER — PATIENT MESSAGE (OUTPATIENT)
Dept: PHYSICAL MEDICINE AND REHAB | Facility: CLINIC | Age: 29
End: 2019-10-15

## 2019-10-15 RX ORDER — OXYCODONE HYDROCHLORIDE 10 MG/1
10 TABLET ORAL EVERY 4 HOURS PRN
Qty: 18 TABLET | Refills: 0 | Status: SHIPPED | OUTPATIENT
Start: 2019-10-22 | End: 2019-10-25

## 2019-10-16 ENCOUNTER — PATIENT MESSAGE (OUTPATIENT)
Dept: PHYSICAL MEDICINE AND REHAB | Facility: CLINIC | Age: 29
End: 2019-10-16

## 2019-10-22 ENCOUNTER — TELEPHONE (OUTPATIENT)
Dept: CARDIOLOGY | Facility: CLINIC | Age: 29
End: 2019-10-22

## 2019-10-22 NOTE — TELEPHONE ENCOUNTER
I called to schedule pt for Rheumatology appointment, Neurology appointment and ultrasound of left arm and pt stated that she is scheduled to see Dr. Friend in Neurology next month and is seeing a new Vascular doctor at  because of her insurance and is seeing Dr. Villafana at  for Rheumatology. Pt stated that she does not want/need to schedule any of the appointments.

## 2019-10-25 ENCOUNTER — PATIENT MESSAGE (OUTPATIENT)
Dept: PHYSICAL MEDICINE AND REHAB | Facility: CLINIC | Age: 29
End: 2019-10-25

## 2019-10-25 DIAGNOSIS — G89.29 CHRONIC BILATERAL LOW BACK PAIN WITHOUT SCIATICA: ICD-10-CM

## 2019-10-25 DIAGNOSIS — M54.50 CHRONIC BILATERAL LOW BACK PAIN WITHOUT SCIATICA: ICD-10-CM

## 2019-10-25 RX ORDER — OXYCODONE AND ACETAMINOPHEN 10; 325 MG/1; MG/1
1 TABLET ORAL
Qty: 150 TABLET | Refills: 0 | Status: SHIPPED | OUTPATIENT
Start: 2019-10-25 | End: 2019-11-19 | Stop reason: SDUPTHER

## 2019-11-11 ENCOUNTER — TELEPHONE (OUTPATIENT)
Dept: PHYSICAL MEDICINE AND REHAB | Facility: CLINIC | Age: 29
End: 2019-11-11

## 2019-11-12 ENCOUNTER — TELEPHONE (OUTPATIENT)
Dept: PHYSICAL MEDICINE AND REHAB | Facility: CLINIC | Age: 29
End: 2019-11-12

## 2019-11-12 NOTE — TELEPHONE ENCOUNTER
----- Message from Colin Briseno sent at 11/11/2019  1:52 PM CST -----  Contact: pt @ [phone]  Pt is asking to speak w/ the doctor regarding MRI completed recently. Pt also wants to discuss a trial medication.      Note                                                                11/11/19     Called and spoke with patient.  Patient was informed that her message will be sent to the Doctor on tomorrow.  None emergency message was on hold until he comes back to the office, 11/12/19.  Patient was ok with this.

## 2019-11-18 ENCOUNTER — PATIENT MESSAGE (OUTPATIENT)
Dept: PHYSICAL MEDICINE AND REHAB | Facility: CLINIC | Age: 29
End: 2019-11-18

## 2019-11-18 DIAGNOSIS — M54.50 CHRONIC BILATERAL LOW BACK PAIN WITHOUT SCIATICA: ICD-10-CM

## 2019-11-18 DIAGNOSIS — G89.29 CHRONIC BILATERAL LOW BACK PAIN WITHOUT SCIATICA: ICD-10-CM

## 2019-11-19 DIAGNOSIS — G89.29 CHRONIC BILATERAL LOW BACK PAIN WITHOUT SCIATICA: ICD-10-CM

## 2019-11-19 DIAGNOSIS — M54.50 CHRONIC BILATERAL LOW BACK PAIN WITHOUT SCIATICA: ICD-10-CM

## 2019-11-19 RX ORDER — OXYCODONE AND ACETAMINOPHEN 10; 325 MG/1; MG/1
1 TABLET ORAL
Qty: 150 TABLET | Refills: 0 | Status: SHIPPED | OUTPATIENT
Start: 2019-11-22 | End: 2019-12-20 | Stop reason: SDUPTHER

## 2019-11-19 RX ORDER — OXYCODONE AND ACETAMINOPHEN 10; 325 MG/1; MG/1
1 TABLET ORAL
Qty: 150 TABLET | Refills: 0 | Status: CANCELLED | OUTPATIENT
Start: 2019-11-19 | End: 2019-12-19

## 2019-11-20 RX ORDER — CARBAMAZEPINE 100 MG/1
TABLET, CHEWABLE ORAL
Qty: 90 TABLET | Refills: 1 | Status: SHIPPED | OUTPATIENT
Start: 2019-11-20 | End: 2020-01-15

## 2019-11-26 ENCOUNTER — OFFICE VISIT (OUTPATIENT)
Dept: NEUROLOGY | Facility: CLINIC | Age: 29
End: 2019-11-26
Payer: MEDICARE

## 2019-11-26 VITALS
SYSTOLIC BLOOD PRESSURE: 121 MMHG | HEART RATE: 98 BPM | HEIGHT: 63 IN | BODY MASS INDEX: 26.83 KG/M2 | WEIGHT: 151.44 LBS | DIASTOLIC BLOOD PRESSURE: 87 MMHG

## 2019-11-26 DIAGNOSIS — G89.29 OTHER CHRONIC PAIN: Primary | ICD-10-CM

## 2019-11-26 DIAGNOSIS — G50.0 TRIGEMINAL NEURALGIA: ICD-10-CM

## 2019-11-26 PROCEDURE — 99213 OFFICE O/P EST LOW 20 MIN: CPT | Mod: PBBFAC | Performed by: PSYCHIATRY & NEUROLOGY

## 2019-11-26 PROCEDURE — 99999 PR PBB SHADOW E&M-EST. PATIENT-LVL III: ICD-10-PCS | Mod: PBBFAC,,, | Performed by: PSYCHIATRY & NEUROLOGY

## 2019-11-26 PROCEDURE — 99214 OFFICE O/P EST MOD 30 MIN: CPT | Mod: S$PBB,,, | Performed by: PSYCHIATRY & NEUROLOGY

## 2019-11-26 PROCEDURE — 99214 PR OFFICE/OUTPT VISIT, EST, LEVL IV, 30-39 MIN: ICD-10-PCS | Mod: S$PBB,,, | Performed by: PSYCHIATRY & NEUROLOGY

## 2019-11-26 PROCEDURE — 99999 PR PBB SHADOW E&M-EST. PATIENT-LVL III: CPT | Mod: PBBFAC,,, | Performed by: PSYCHIATRY & NEUROLOGY

## 2019-11-26 NOTE — PROGRESS NOTES
"Subjective:       Patient ID: Kendra Russell is a 29 y.o. female.    Chief Complaint: Neurologic Problem    HPI     Dr. Driver note:   Ms. Russell is a 28 y.o. woman with PMH interstitial cystitis, chronic pain, traumatic TGN, migraines who presents for evaluation of DVA.  We discussed that the DVA is likely an incidental finding and not related to her symptoms of facial pain.  No further treatment or monitoring necessary for DVA.  Her facial pain does seem to be a traumatic case of trigeminal neuralgia, following a dental injection.  We discussed that it often takes up to 12 mos for traumatic nerve injury to improve.  No need for additional CNS imaging as injury to TGN is most certainly extracranial.  I think she would be best served by seeing a specialist in this area (role for EMG?).   Will discuss with neuromuscular and facial pain colleagues.    1.5 yr ago - dental procedure injury of TN during inj of lwer jaw on L  continuous pain 2/10   Flare up lasting up to 3 weeks   Trigger - chew, talk, touch   Now scalp touch - pain   Also reports pain transferred to R side     " I can feel my DVA on my scalp - its always throbbing"   "I really want to know the extent of the injury"     Also has migraines     Treatment history:  Resolution of headache with sleep - yes   Meds tried:  Failed: Tramadol, tylenol, neurontin  Oxycodone - helps. Can go 4 days a week without taking it.     carbamez - taking PRN     Headache risk factors:   H/o TBI  - no   H/o Meningitis  - no   F/h Aneurysms  - no    Review of Systems   Constitutional: Negative.    HENT: Negative.    Eyes: Negative.    Respiratory: Negative.    Cardiovascular: Negative.    Gastrointestinal: Negative.    Endocrine: Negative.    Genitourinary: Negative.    Musculoskeletal: Negative.    Skin: Negative.    Allergic/Immunologic: Negative.    Neurological: Positive for facial asymmetry.   Hematological: Negative.    Psychiatric/Behavioral: Negative.        Objective:    "   Physical Exam   Constitutional: She is oriented to person, place, and time. She appears well-developed and well-nourished.   HENT:   Head: Normocephalic and atraumatic.   Right Ear: External ear normal.   Left Ear: External ear normal.   Nose: Nose normal.   Mouth/Throat: Oropharynx is clear and moist.   Eyes: Pupils are equal, round, and reactive to light. EOM are normal.   Cardiovascular: Normal rate and regular rhythm.   Pulmonary/Chest: Effort normal.   Neurological: She is alert and oriented to person, place, and time. She exhibits normal muscle tone. Coordination normal.   Skin: Skin is warm and dry.   Psychiatric: She has a normal mood and affect. Her behavior is normal. Judgment and thought content normal.       Slight edema of L face   No autonomic dysf on L     Headache Exam:  Optic disc is flat OU   V2,V3 distribution hyperalgesia jack   No gross TMJ abnormalities   No ptosis   No conj injection   No meningismus   No neck stiffness  No C spine tenderness  No Cervical facet tenderness on palpation jack   No tender points over trapezium   No supraorbital nerve exit point tenderness  No occipital nerve exit point tenderness  jack auriculotemporal nerve tenderness     Assessment:       1. Other chronic pain    2. Trigeminal neuralgia      Unlikely relate to DVA  Pain refers to scalp       Plan:     1, Pending NCS  2, consider gasserian-ganglion-block by pain management     Patient verbalized understanding to plan. I answered all her questions to her satisfaction.

## 2019-11-26 NOTE — LETTER
November 26, 2019      Josie Driver MD  [address]  [facility] [address]           [facility] - Neurology Epilepsy  [address]  [facility] [address]  Phone: [phone]  Fax: [phone]          Patient: Kendra Russell   MR Number: [id number]   YOB: 1990   Date of Visit: 11/26/2019       Dear Dr. Josie Driver:    Thank you for referring Kendra Russell to me for evaluation. Attached you will find relevant portions of my assessment and plan of care.    If you have questions, please do not hesitate to call me. I look forward to following Kendra Russell along with you.    Sincerely,    Amadou Friend MD    Enclosure  CC:  No Recipients    If you would like to receive this communication electronically, please contact [email or web address] or [phone] to request more information on [employer] access.    For providers and/or their staff who would like to refer a patient to [facility], please contact us through our one-stop-shop provider referral line, [facility], at [phone].    If you feel you have received this communication in error or would no longer like to receive these types of communications, please e-mail [email or web address]

## 2019-12-17 ENCOUNTER — TELEPHONE (OUTPATIENT)
Dept: PHYSICAL MEDICINE AND REHAB | Facility: CLINIC | Age: 29
End: 2019-12-17

## 2019-12-17 ENCOUNTER — PATIENT MESSAGE (OUTPATIENT)
Dept: PHYSICAL MEDICINE AND REHAB | Facility: CLINIC | Age: 29
End: 2019-12-17

## 2019-12-17 NOTE — TELEPHONE ENCOUNTER
Doctor was informed of patient message, verbal.    ----- Message from Lilli Rivera sent at 12/17/2019  9:52 AM CST -----  Contact: self @ [phone]  Pt says she was suppose to f/u today but has the flu.  She has rescheduled for 2-21-20.  Pls call.

## 2019-12-20 ENCOUNTER — PATIENT MESSAGE (OUTPATIENT)
Dept: PHYSICAL MEDICINE AND REHAB | Facility: CLINIC | Age: 29
End: 2019-12-20

## 2019-12-20 DIAGNOSIS — G89.29 CHRONIC BILATERAL LOW BACK PAIN WITHOUT SCIATICA: ICD-10-CM

## 2019-12-20 DIAGNOSIS — M54.50 CHRONIC BILATERAL LOW BACK PAIN WITHOUT SCIATICA: ICD-10-CM

## 2019-12-20 RX ORDER — OXYCODONE AND ACETAMINOPHEN 10; 325 MG/1; MG/1
1 TABLET ORAL
Qty: 150 TABLET | Refills: 0 | Status: SHIPPED | OUTPATIENT
Start: 2019-12-21 | End: 2020-01-14 | Stop reason: SDUPTHER

## 2019-12-20 RX ORDER — OXYCODONE AND ACETAMINOPHEN 10; 325 MG/1; MG/1
1 TABLET ORAL
Qty: 150 TABLET | Refills: 0 | Status: CANCELLED | OUTPATIENT
Start: 2019-12-20 | End: 2020-01-19

## 2020-01-14 ENCOUNTER — PATIENT MESSAGE (OUTPATIENT)
Dept: PHYSICAL MEDICINE AND REHAB | Facility: CLINIC | Age: 30
End: 2020-01-14

## 2020-01-14 DIAGNOSIS — M54.50 CHRONIC BILATERAL LOW BACK PAIN WITHOUT SCIATICA: ICD-10-CM

## 2020-01-14 DIAGNOSIS — G89.29 CHRONIC BILATERAL LOW BACK PAIN WITHOUT SCIATICA: ICD-10-CM

## 2020-01-15 ENCOUNTER — PATIENT MESSAGE (OUTPATIENT)
Dept: PHYSICAL MEDICINE AND REHAB | Facility: CLINIC | Age: 30
End: 2020-01-15

## 2020-01-15 RX ORDER — OXYCODONE AND ACETAMINOPHEN 10; 325 MG/1; MG/1
1 TABLET ORAL
Qty: 150 TABLET | Refills: 0 | Status: SHIPPED | OUTPATIENT
Start: 2020-01-17 | End: 2020-02-13 | Stop reason: SDUPTHER

## 2020-01-15 RX ORDER — CARBAMAZEPINE 100 MG/1
TABLET, CHEWABLE ORAL
Qty: 90 TABLET | Refills: 1 | Status: SHIPPED | OUTPATIENT
Start: 2020-01-15 | End: 2020-06-25

## 2020-01-31 NOTE — TELEPHONE ENCOUNTER
0711  Received report on pt.from off going RN. Resting quietly in bed on rounds. Bilateral wrist restraints on. Ace wrap on rt leg stump. No acute distress noted. Mabeline Sink Pt awake and alert. Oriented to person, place and situation. Reoriented to time. Angry that wrist restraints are on. Aware that she pulled her dressing off during the night. Sts\" I did it because it was hurting\". Will remove restraints and monitor orientation. Bed alarms on. Side rails up x 4.      1000  Remains oriented x3 but forgetful to time. Cooperative and not pulling at iv lines or dressings. 1600 family at bedside. Pt pulled dressing off stump again. Seen by Sandi Olivas vascular PA who said  to leave bulky dressing off and apply thin dressing. 1925 Bedside and Verbal shift change report given to [address] (oncoming nurse) by Elizabeth Singh RN (offgoing nurse). Report given with LEA, Izaiah and MAR. I called the patient.  She said she is due for back surgery on 07/09/2019 at the [facility] [place] state take away the scar tissue and the fluid.  He is also due for bladder surgery for interstitial cystitis on 06/01/2019. She has been having more bladder pain and has been using oxycodone/APAP 10/325 every 4 hr instead of every 6 hr.  I agree to send an early refill on 06/01/2019.

## 2020-02-12 ENCOUNTER — TELEPHONE (OUTPATIENT)
Dept: VASCULAR SURGERY | Facility: CLINIC | Age: 30
End: 2020-02-12

## 2020-02-12 NOTE — TELEPHONE ENCOUNTER
Attempted to contact patient in response to message. Voice message left for patient requesting return call.----- Message from Papa Gong sent at 2/12/2020  2:09 PM CST -----  Contact: Pt      The Pt would like to confirm that you would accept her insurance for a consult.  She states that she has Medicare Part A & B and her secondary is Medicaid.    Phone # [phone]

## 2020-02-13 DIAGNOSIS — M54.50 CHRONIC BILATERAL LOW BACK PAIN WITHOUT SCIATICA: ICD-10-CM

## 2020-02-13 DIAGNOSIS — G89.29 CHRONIC BILATERAL LOW BACK PAIN WITHOUT SCIATICA: ICD-10-CM

## 2020-02-13 RX ORDER — OXYCODONE AND ACETAMINOPHEN 10; 325 MG/1; MG/1
1 TABLET ORAL
Qty: 150 TABLET | Refills: 0 | Status: SHIPPED | OUTPATIENT
Start: 2020-02-20 | End: 2020-03-16

## 2020-02-17 ENCOUNTER — PATIENT MESSAGE (OUTPATIENT)
Dept: PHYSICAL MEDICINE AND REHAB | Facility: CLINIC | Age: 30
End: 2020-02-17

## 2020-02-21 ENCOUNTER — PATIENT MESSAGE (OUTPATIENT)
Dept: PHYSICAL MEDICINE AND REHAB | Facility: CLINIC | Age: 30
End: 2020-02-21

## 2020-03-16 ENCOUNTER — PATIENT MESSAGE (OUTPATIENT)
Dept: PHYSICAL MEDICINE AND REHAB | Facility: CLINIC | Age: 30
End: 2020-03-16

## 2020-03-16 DIAGNOSIS — M54.50 CHRONIC BILATERAL LOW BACK PAIN WITHOUT SCIATICA: ICD-10-CM

## 2020-03-16 DIAGNOSIS — G89.29 CHRONIC BILATERAL LOW BACK PAIN WITHOUT SCIATICA: ICD-10-CM

## 2020-03-16 RX ORDER — OXYCODONE AND ACETAMINOPHEN 10; 325 MG/1; MG/1
TABLET ORAL
Qty: 150 TABLET | Refills: 0 | Status: SHIPPED | OUTPATIENT
Start: 2020-03-18 | End: 2020-04-14 | Stop reason: SDUPTHER

## 2020-04-13 ENCOUNTER — OFFICE VISIT (OUTPATIENT)
Dept: URGENT CARE | Facility: CLINIC | Age: 30
End: 2020-04-13
Payer: MEDICARE

## 2020-04-13 VITALS
HEIGHT: 63 IN | WEIGHT: 146 LBS | DIASTOLIC BLOOD PRESSURE: 67 MMHG | HEART RATE: 130 BPM | TEMPERATURE: 101 F | BODY MASS INDEX: 25.87 KG/M2 | OXYGEN SATURATION: 99 % | SYSTOLIC BLOOD PRESSURE: 108 MMHG

## 2020-04-13 DIAGNOSIS — R11.2 NON-INTRACTABLE VOMITING WITH NAUSEA, UNSPECIFIED VOMITING TYPE: ICD-10-CM

## 2020-04-13 DIAGNOSIS — Z20.822 SUSPECTED COVID-19 VIRUS INFECTION: Primary | ICD-10-CM

## 2020-04-13 DIAGNOSIS — R06.02 SOB (SHORTNESS OF BREATH): ICD-10-CM

## 2020-04-13 DIAGNOSIS — R19.7 DIARRHEA, UNSPECIFIED TYPE: ICD-10-CM

## 2020-04-13 PROCEDURE — U0002 COVID-19 LAB TEST NON-CDC: HCPCS

## 2020-04-13 PROCEDURE — 71046 X-RAY EXAM CHEST 2 VIEWS: CPT | Mod: S$GLB,,, | Performed by: RADIOLOGY

## 2020-04-13 PROCEDURE — 71046 XR CHEST PA AND LATERAL: ICD-10-PCS | Mod: S$GLB,,, | Performed by: RADIOLOGY

## 2020-04-13 PROCEDURE — 99214 PR OFFICE/OUTPT VISIT, EST, LEVL IV, 30-39 MIN: ICD-10-PCS | Mod: S$GLB,,, | Performed by: NURSE PRACTITIONER

## 2020-04-13 PROCEDURE — 99214 OFFICE O/P EST MOD 30 MIN: CPT | Mod: S$GLB,,, | Performed by: NURSE PRACTITIONER

## 2020-04-13 RX ORDER — PROMETHAZINE HYDROCHLORIDE 25 MG/1
25 TABLET ORAL EVERY 6 HOURS PRN
Qty: 20 TABLET | Refills: 0 | Status: SHIPPED | OUTPATIENT
Start: 2020-04-13

## 2020-04-13 NOTE — PATIENT INSTRUCTIONS
You are currently being tested for covid 19.  Please self quarantine herself until we have results in the next 2-3 days.  If your symptoms become worse please use [facility] any were care on line and you will be reassessed or go to the nearest emergency room.      [place] Diet (Child)  Your child has been prescribed a bland diet (also called a BRAT diet which stands for bananas, rice, applesauce, toast). This diet consists of foods that are soft in texture, mildly seasoned, low in fiber, and easily digested. This diet is for children who have digestive problems. A bland diet reduces irritation of the digestive tract. Have your child eat small frequent meals throughout the day, but stop eating 2 hours before bedtime. Follow any specific instructions from the healthcare provider about foods and beverages your child can and cannot have. The general guidelines below can help get your child started on this diet.    OK to include:  · Water, formula, milk, clear liquids, juices, oral rehydration solutions, broth.  · Cereal, oatmeal, pasta, mashed bananas, applesauce, cooked vegetables, mashed potatoes, rice, and soups with rice or noodles  · Dry toast, crackers, pretzels, bread  Avoid raw fruits and vegetables, beans, spices.  Note: Some children may be sensitive to the lactose in milk or formula. Their symptoms may worsen. If that happens, use oral rehydration solution instead of milk or formula.  Home care  Children should follow the BRAT diet for only a short period of time because it does not provide all the elements of a healthy diet. Following the BRAT diet for too long can cause your child's body to become malnourished. This means he or she is not getting enough of many important nutrients. If your child's body is malnourished, it will be hard for him or her to get better.  Your child should be able to start eating a more regular diet, including fruits and vegetables, within about 24 to 48 hours after vomiting or  "having diarrhea.  Ask your family doctor if you have any questions about whether your child should follow the BRAT diet.  Date Last Reviewed: 12/21/2015 © 2000-2017 [employer]. [address] [place] [address], [place] [address]. All rights reserved. This information is not intended as a substitute for professional medical care. Always follow your healthcare professional's instructions.        Viral Syndrome (Adult)  A viral illness may cause a number of symptoms. The symptoms depend on the part of the body that the virus affects. If it settles in your nose, throat, and lungs, it may cause cough, sore throat, congestion, and sometimes headache. If it settles in your stomach and intestinal tract, it may cause vomiting and diarrhea. Sometimes it causes vague symptoms like "aching all over," feeling tired, loss of appetite, or fever.  A viral illness usually lasts 1 to 2 weeks, but sometimes it lasts longer. In some cases, a more serious infection can look like a viral syndrome in the first few days of the illness. You may need another exam and additional tests to know the difference. Watch for the warning signs listed below.  Home care  Follow these guidelines for taking care of yourself at home:  · If symptoms are severe, rest at home for the first 2 to 3 days.  · Stay away from cigarette smoke - both your smoke and the smoke from others.  · You may use over-the-counter acetaminophen or ibuprofen for fever, muscle aching, and headache, unless another medicine was prescribed for this. If you have chronic liver or kidney disease or ever had a stomach ulcer or GI bleeding, talk with your doctor before using these medicines. No one who is younger than 18 and ill with a fever should take aspirin. It may cause severe disease or death.  · Your appetite may be poor, so a light diet is fine. Avoid dehydration by drinking 8 to 12 8-ounce glasses of fluids each day. This may include water; orange juice; lemonade; apple, " grape, and cranberry juice; clear fruit drinks; electrolyte replacement and sports drinks; and decaffeinated teas and coffee. If you have been diagnosed with a kidney disease, ask your doctor how much and what types of fluids you should drink to prevent dehydration. If you have kidney disease, drinking too much fluid can cause it build up in the your body and be dangerous to your health.  · Over-the-counter remedies won't shorten the length of the illness but may be helpful for cough, sore throat; and nasal and sinus congestion. Don't use decongestants if you have high blood pressure.  Follow-up care  Follow up with your healthcare provider if you do not improve over the next week.  Call 911  Get emergency medical care if any of the following occur:  · Convulsion  · Feeling weak, dizzy, or like you are going to faint  · Chest pain, shortness of breath, wheezing, or difficulty breathing  When to seek medical advice  Call your healthcare provider right away if any of these occur:  · Cough with lots of colored sputum (mucus) or blood in your sputum  · Chest pain, shortness of breath, wheezing, or difficulty breathing  · Severe headache; face, neck, or ear pain  · Severe, constant pain in the lower right side of your belly (abdominal)  · Continued vomiting (cant keep liquids down)  · Frequent diarrhea (more than 5 times a day); blood (red or black color) or mucus in diarrhea  · Feeling weak, dizzy, or like you are going to faint  · Extreme thirst  · Fever of 100.4°F (38°C) or higher, or as directed by your healthcare provider  Date Last Reviewed: 9/25/2015  © 5192-6611 [employer]. [address] [place] [address], [place] [address]. All rights reserved. This information is not intended as a substitute for professional medical care. Always follow your healthcare professional's instructions.

## 2020-04-13 NOTE — PROGRESS NOTES
"Subjective:       Patient ID: Kendra Russell is a 29 y.o. female.    Vitals:  height is 5' 3" (1.6 m) and weight is 66.2 kg (146 lb). Her temperature is 100.6 °F (38.1 °C) (abnormal). Her blood pressure is 108/67 and her pulse is 130 (abnormal). Her oxygen saturation is 99%.     Chief Complaint: Fever    Patient #[phone]. Exposure to positive COVID family members.  Patient c/o of onset Saturday of fever above 102,body ache/headaches, diarrhea, nausea, vomiting. OTC medication taken with no relief. Started 2 weeks ago with n/v/d and feeling like her liver was bruised. This week started with fever and chest pain, SOB. Is not currently on any immunosuppressing drugs. Took 1000mg of Tylenol at 12 noon PTA.     Fever    The current episode started in the past 7 days. The problem occurs constantly. The problem has been unchanged. The maximum temperature noted was 102 to 102.9 F. The temperature was taken using an oral thermometer. Associated symptoms include diarrhea, headaches, muscle aches, nausea and vomiting. Pertinent negatives include no chest pain, congestion, coughing, rash, sore throat or urinary pain. She has tried acetaminophen for the symptoms. The treatment provided no relief.   Risk factors: sick contacts        Constitution: Positive for chills, sweating, fatigue and fever.   HENT: Negative for congestion and sore throat.    Neck: Negative for painful lymph nodes.   Cardiovascular: Negative for chest pain and leg swelling.   Eyes: Negative for double vision and blurred vision.   Respiratory: Positive for chest tightness and shortness of breath. Negative for cough.    Gastrointestinal: Positive for nausea, vomiting and diarrhea.   Genitourinary: Negative for dysuria, frequency, urgency and history of kidney stones.   Musculoskeletal: Positive for muscle ache. Negative for joint pain, joint swelling and muscle cramps.   Skin: Negative for color change, pale, rash and bruising.   Allergic/Immunologic: " Negative for seasonal allergies.   Neurological: Positive for headaches. Negative for dizziness, history of vertigo, light-headedness and passing out.   Hematologic/Lymphatic: Negative for swollen lymph nodes.   Psychiatric/Behavioral: Negative for nervous/anxious, sleep disturbance and depression. The patient is not nervous/anxious.        Objective:      Physical Exam    due to the state of emergency this visit has been done remotely as per [facility] protocol.   Xr Chest Pa And Lateral    Result Date: 4/13/2020  EXAMINATION: XR CHEST PA AND LATERAL CLINICAL HISTORY: Other general symptoms and signs TECHNIQUE: PA and lateral views of the chest were performed. COMPARISON: 10/09/2014. FINDINGS: Soft tissues of the patient's arms project over lateral view obscuring some detail of the retrosternal airspace and mediastinal margins. Mediastinal structures are midline. Cardiac silhouette and pulmonary vascular distribution are normal. Lung volumes are normal and symmetric. I detect no pulmonary disease, pleural fluid, lymph node enlargement, cardiac decompensation, pneumothorax, pneumomediastinum, pneumoperitoneum or significant osseous abnormality.     I detect no radiographic evidence of pneumonia or other source of cough, noting that early or mild viral pneumonia may be radiographically occult. Electronically signed by: Cindy Broussard MD Date:    04/13/2020 Time:    15:08  Assessment:       1. Suspected Covid-19 Virus Infection    2. Non-intractable vomiting with nausea, unspecified vomiting type    3. Diarrhea, unspecified type    4. SOB (shortness of breath)        Plan:     I had a detailed discussion with the patient on quarantining themselves in till they receive results.  Also discussed symptomatic management.  Also discussed calling anywhere care for further assistance if they start become worse.  Patient voiced understanding and is in agreement with the plan of care.    Suspected Covid-19 Virus Infection  -      XR CHEST PA AND LATERAL; Future; Expected date: 04/13/2020  -     COVID-19 Routine Screening    Non-intractable vomiting with nausea, unspecified vomiting type  -     promethazine (PHENERGAN) 25 MG tablet; Take 1 tablet (25 mg total) by mouth every 6 (six) hours as needed for Nausea.  Dispense: 20 tablet; Refill: 0    Diarrhea, unspecified type    SOB (shortness of breath)          Patient Instructions     You are currently being tested for covid 19.  Please self quarantine herself until we have results in the next 2-3 days.  If your symptoms become worse please use [facility] any were care on line and you will be reassessed or go to the nearest emergency room.      [place] Diet (Child)  Your child has been prescribed a bland diet (also called a BRAT diet which stands for bananas, rice, applesauce, toast). This diet consists of foods that are soft in texture, mildly seasoned, low in fiber, and easily digested. This diet is for children who have digestive problems. A bland diet reduces irritation of the digestive tract. Have your child eat small frequent meals throughout the day, but stop eating 2 hours before bedtime. Follow any specific instructions from the healthcare provider about foods and beverages your child can and cannot have. The general guidelines below can help get your child started on this diet.    OK to include:  · Water, formula, milk, clear liquids, juices, oral rehydration solutions, broth.  · Cereal, oatmeal, pasta, mashed bananas, applesauce, cooked vegetables, mashed potatoes, rice, and soups with rice or noodles  · Dry toast, crackers, pretzels, bread  Avoid raw fruits and vegetables, beans, spices.  Note: Some children may be sensitive to the lactose in milk or formula. Their symptoms may worsen. If that happens, use oral rehydration solution instead of milk or formula.  Home care  Children should follow the BRAT diet for only a short period of time because it does not provide all the elements of a  "healthy diet. Following the BRAT diet for too long can cause your child's body to become malnourished. This means he or she is not getting enough of many important nutrients. If your child's body is malnourished, it will be hard for him or her to get better.  Your child should be able to start eating a more regular diet, including fruits and vegetables, within about 24 to 48 hours after vomiting or having diarrhea.  Ask your family doctor if you have any questions about whether your child should follow the BRAT diet.  Date Last Reviewed: 12/21/2015 © 2000-2017 [employer]. [address] [place] [address]. All rights reserved. This information is not intended as a substitute for professional medical care. Always follow your healthcare professional's instructions.        Viral Syndrome (Adult)  A viral illness may cause a number of symptoms. The symptoms depend on the part of the body that the virus affects. If it settles in your nose, throat, and lungs, it may cause cough, sore throat, congestion, and sometimes headache. If it settles in your stomach and intestinal tract, it may cause vomiting and diarrhea. Sometimes it causes vague symptoms like "aching all over," feeling tired, loss of appetite, or fever.  A viral illness usually lasts 1 to 2 weeks, but sometimes it lasts longer. In some cases, a more serious infection can look like a viral syndrome in the first few days of the illness. You may need another exam and additional tests to know the difference. Watch for the warning signs listed below.  Home care  Follow these guidelines for taking care of yourself at home:  · If symptoms are severe, rest at home for the first 2 to 3 days.  · Stay away from cigarette smoke - both your smoke and the smoke from others.  · You may use over-the-counter acetaminophen or ibuprofen for fever, muscle aching, and headache, unless another medicine was prescribed for this. If you have chronic liver or " kidney disease or ever had a stomach ulcer or GI bleeding, talk with your doctor before using these medicines. No one who is younger than 18 and ill with a fever should take aspirin. It may cause severe disease or death.  · Your appetite may be poor, so a light diet is fine. Avoid dehydration by drinking 8 to 12 8-ounce glasses of fluids each day. This may include water; orange juice; lemonade; apple, grape, and cranberry juice; clear fruit drinks; electrolyte replacement and sports drinks; and decaffeinated teas and coffee. If you have been diagnosed with a kidney disease, ask your doctor how much and what types of fluids you should drink to prevent dehydration. If you have kidney disease, drinking too much fluid can cause it build up in the your body and be dangerous to your health.  · Over-the-counter remedies won't shorten the length of the illness but may be helpful for cough, sore throat; and nasal and sinus congestion. Don't use decongestants if you have high blood pressure.  Follow-up care  Follow up with your healthcare provider if you do not improve over the next week.  Call 911  Get emergency medical care if any of the following occur:  · Convulsion  · Feeling weak, dizzy, or like you are going to faint  · Chest pain, shortness of breath, wheezing, or difficulty breathing  When to seek medical advice  Call your healthcare provider right away if any of these occur:  · Cough with lots of colored sputum (mucus) or blood in your sputum  · Chest pain, shortness of breath, wheezing, or difficulty breathing  · Severe headache; face, neck, or ear pain  · Severe, constant pain in the lower right side of your belly (abdominal)  · Continued vomiting (cant keep liquids down)  · Frequent diarrhea (more than 5 times a day); blood (red or black color) or mucus in diarrhea  · Feeling weak, dizzy, or like you are going to faint  · Extreme thirst  · Fever of 100.4°F (38°C) or higher, or as directed by your healthcare  provider  Date Last Reviewed: 9/25/2015  © 2015-2155 [employer]. [address] [place] [address], [place] [address]. All rights reserved. This information is not intended as a substitute for professional medical care. Always follow your healthcare professional's instructions.

## 2020-04-14 ENCOUNTER — TELEPHONE (OUTPATIENT)
Dept: URGENT CARE | Facility: CLINIC | Age: 30
End: 2020-04-14

## 2020-04-14 DIAGNOSIS — M54.50 CHRONIC BILATERAL LOW BACK PAIN WITHOUT SCIATICA: ICD-10-CM

## 2020-04-14 DIAGNOSIS — G89.29 CHRONIC BILATERAL LOW BACK PAIN WITHOUT SCIATICA: ICD-10-CM

## 2020-04-14 LAB — SARS-COV-2 RNA RESP QL NAA+PROBE: NOT DETECTED

## 2020-04-14 RX ORDER — OXYCODONE AND ACETAMINOPHEN 10; 325 MG/1; MG/1
1 TABLET ORAL
Qty: 150 TABLET | Refills: 0 | Status: SHIPPED | OUTPATIENT
Start: 2020-04-16 | End: 2020-05-12 | Stop reason: SDUPTHER

## 2020-04-14 RX ORDER — ETONOGESTREL AND ETHINYL ESTRADIOL VAGINAL RING .015; .12 MG/D; MG/D
RING VAGINAL
COMMUNITY
Start: 2020-02-14 | End: 2021-04-30 | Stop reason: SDUPTHER

## 2020-04-14 NOTE — TELEPHONE ENCOUNTER
Patient called with questions about her negative COVID results.  States that she is still symptomatic and is sure that she is positive as her father has it and is in the hospital.  She is asking if we do stool samples to test for COVID.  I explained to  her that we did not I did advise her to call her GI doctor.  States that she is going to do this.

## 2020-04-14 NOTE — TELEPHONE ENCOUNTER
Last Rx refill-----03/16/20  Last office visit--08/16/19  Next office visit--06/26/20      Kendra Londono Drew would like a refill of the following medications:        oxyCODONE-acetaminophen (PERCOCET)  mg per tablet [Shobha Perez MD]      Patient Comment: hi, it will be 30 days on Thursday the 16th of this week since my last script was filled. Can you please send it to be filled on Thursday so I can have my caregiver pick it up for me. Thank you, please stay safe at the hospital.    Preferred pharmacy: [facility] [place] - [address]  Delivery method: Pickup

## 2020-08-12 ENCOUNTER — TELEPHONE (OUTPATIENT)
Dept: PHYSICAL MEDICINE AND REHAB | Facility: CLINIC | Age: 30
End: 2020-08-12

## 2020-08-12 DIAGNOSIS — G89.29 CHRONIC BILATERAL LOW BACK PAIN WITHOUT SCIATICA: ICD-10-CM

## 2020-08-12 DIAGNOSIS — M54.50 CHRONIC BILATERAL LOW BACK PAIN WITHOUT SCIATICA: ICD-10-CM

## 2020-08-12 RX ORDER — OXYCODONE AND ACETAMINOPHEN 10; 325 MG/1; MG/1
1 TABLET ORAL
Qty: 150 TABLET | Refills: 0 | Status: SHIPPED | OUTPATIENT
Start: 2020-08-15 | End: 2020-08-13 | Stop reason: SDUPTHER

## 2020-08-13 ENCOUNTER — PATIENT MESSAGE (OUTPATIENT)
Dept: PHYSICAL MEDICINE AND REHAB | Facility: CLINIC | Age: 30
End: 2020-08-13

## 2020-08-13 DIAGNOSIS — M54.50 CHRONIC BILATERAL LOW BACK PAIN WITHOUT SCIATICA: ICD-10-CM

## 2020-08-13 DIAGNOSIS — G89.29 CHRONIC BILATERAL LOW BACK PAIN WITHOUT SCIATICA: ICD-10-CM

## 2020-08-13 RX ORDER — OXYCODONE AND ACETAMINOPHEN 10; 325 MG/1; MG/1
1 TABLET ORAL
Qty: 150 TABLET | Refills: 0 | Status: SHIPPED | OUTPATIENT
Start: 2020-08-14 | End: 2020-09-11 | Stop reason: SDUPTHER

## 2020-08-13 NOTE — TELEPHONE ENCOUNTER
----- Message from Elias Mccormick sent at 8/13/2020 12:43 PM CDT -----  Contact: Pharmacy  [facility] called to inform that they're deleting the ( oxyCODONE-acetaminophen (PERCOCET)  mg per tablet) script they received due to not having this medication in stock an they're not expecting any no time soon, pls forward script to:     [facility] [place] [facility] [address] [facility]  [address] [facility]  [place] [address]  Phone: [phone] Fax: [phone]

## 2020-08-13 NOTE — TELEPHONE ENCOUNTER
Called  and spoke with patient.  Patient asked to send her Rx  to [facility].    Patient also asked to change the medication date from Saturday to Friday.  Patient states Saturday will make 31 days.

## 2020-08-31 ENCOUNTER — TELEPHONE (OUTPATIENT)
Dept: PHYSICAL MEDICINE AND REHAB | Facility: CLINIC | Age: 30
End: 2020-08-31

## 2020-08-31 NOTE — TELEPHONE ENCOUNTER
----- Message from Elias Mccormick sent at 8/31/2020  2:25 PM CDT -----  Contact: Pt @[phone]  Patient requesting a return call regarding the 9-11th appt, she can't do morning appts, pls call

## 2020-08-31 NOTE — TELEPHONE ENCOUNTER
Called and spoke with patient.  Patient rescheduled her appointment to January.  Patient was placed on the wait list for sooner appointment.

## 2020-09-09 ENCOUNTER — PATIENT MESSAGE (OUTPATIENT)
Dept: PHYSICAL MEDICINE AND REHAB | Facility: CLINIC | Age: 30
End: 2020-09-09

## 2020-09-10 ENCOUNTER — OFFICE VISIT (OUTPATIENT)
Dept: PHYSICAL MEDICINE AND REHAB | Facility: CLINIC | Age: 30
End: 2020-09-10
Payer: MEDICARE

## 2020-09-10 ENCOUNTER — OFFICE VISIT (OUTPATIENT)
Dept: PHYSICAL MEDICINE AND REHAB | Facility: CLINIC | Age: 30
End: 2020-09-10
Payer: MEDICAID

## 2020-09-10 ENCOUNTER — PATIENT MESSAGE (OUTPATIENT)
Dept: PHYSICAL MEDICINE AND REHAB | Facility: CLINIC | Age: 30
End: 2020-09-10

## 2020-09-10 DIAGNOSIS — Z79.891 CHRONIC USE OF OPIATE FOR THERAPEUTIC PURPOSE: ICD-10-CM

## 2020-09-10 DIAGNOSIS — G89.29 CHRONIC BILATERAL LOW BACK PAIN WITH LEFT-SIDED SCIATICA: Primary | ICD-10-CM

## 2020-09-10 DIAGNOSIS — M54.42 CHRONIC BILATERAL LOW BACK PAIN WITH LEFT-SIDED SCIATICA: Primary | ICD-10-CM

## 2020-09-10 PROCEDURE — 99443 PR PHYSICIAN TELEPHONE EVALUATION 21-30 MIN: CPT | Mod: 95,,, | Performed by: PHYSICAL MEDICINE & REHABILITATION

## 2020-09-10 PROCEDURE — 99443 PR PHYSICIAN TELEPHONE EVALUATION 21-30 MIN: ICD-10-PCS | Mod: 95,,, | Performed by: PHYSICAL MEDICINE & REHABILITATION

## 2020-09-10 NOTE — PROGRESS NOTES
Subjective:       Patient ID: Kendra Russell is a 30 y.o. female.    A telemedicine Audio Visit is being done today (due to the COVID-19 restrictions).     The patient location is: home  The chief complaint leading to consultation is: back pain.  Visit type: Virtual visit with synchronous audio  Total time spent with patient: 25 min  Each patient to whom he or she provides medical services by telemedicine is:  (1) informed of the relationship between the physician and patient and the respective role of any other health care provider with respect to management of the patient; and (2) notified that he or she may decline to receive medical services by telemedicine and may withdraw from such care at any time.  This service was not originating from a related E/M service provided within the previous 7 days nor will  to an E/M service or procedure within the next 24 hours or my soonest available appointment.  Prevailing standard of care was able to be met in this audio-only visit.    Chief Complaint: No chief complaint on file.    HPI     HISTORY OF PRESENT ILLNESS:  Mrs. Russell is a 30-year-old white female who is [facility] for chronic low back pain.  Her past medical history is significant for interstitial cystitis, status post bladder stimulator in 2010 and subsequent removal, colon surgery in 2012 for bowel obstruction, TMJ dysfunction and trigeminal neuralgia.  Her last visit to the clinic was on 8/16/2019.  She was maintained on p.r.n. Oxycodone/APAP.    The patient reports that she had another bladder surgery, followed by back surgery to take care of the scar tissue.  She had chronic bladder infections requiring prolonged antibiotic therapy.  Her back pain has been recently worse.  It is a constant deep aching pain.  She has occasional shooting pain with numbness and throbbing to the left foot.   Her pain is aggravated with prolonged standing, bending and lifting.  It is better  with rest and her pain medications.  Her maximum pain is 10/10 and minimum 3/10.  Today it is 6/10.  The patient denies any significant lower extremity weakness.  She denies any bowel or bladder incontinence although she does intermittent bladder catheterization program due to occasional retention.    As noted previous visit, the patient is allergic/intolerant to multiple medications.  She is currently taking oxycodone/APAP 10/325 p.r.n. 5 times per day.        Past Medical History:   Diagnosis Date    FHx: bowel obstruction     HA (headache)     Interstitial cystitis     Ovarian cyst     Ulcer            Review of Systems   Constitutional: Positive for fatigue and fever. Negative for chills.   HENT: Negative for trouble swallowing.    Eyes: Positive for visual disturbance.   Respiratory: Negative for shortness of breath.    Cardiovascular: Negative for chest pain.   Gastrointestinal: Positive for nausea and vomiting. Negative for blood in stool and constipation.   Genitourinary: Positive for difficulty urinating.   Musculoskeletal: Positive for back pain and neck pain. Negative for arthralgias, gait problem and joint swelling.   Neurological: Positive for dizziness and headaches.   Psychiatric/Behavioral: Negative for behavioral problems and sleep disturbance.       Objective:      Physical Exam      N/A due to a telemedicine visit.    BUE:  The patient reports good strength .    BLE:  The patient reports  good strength .    Assessment:       1. Chronic bilateral low back pain with left-sided sciatica    2. Chronic use of opiate for therapeutic purpose        Plan:         - Continue oxyCODONE-acetaminophen (PERCOCET)  mg per tablet; Take 1 tablet by mouth every 4-6 hours as needed for Pain.    - Regular home exercise program was encouraged.  - She was asked to have the report of MRI of the lumbar spine done outside [facility] to our clinic.  - Follow up in about 4 months (around 1/10/2021).     This note  was partly generated with [employer] for [employer] voice recognition software. I apologize for any possible typographical errors.

## 2020-09-10 NOTE — PROGRESS NOTES
"Subjective:       Patient ID: Kendra Russell is a 30 y.o. female.    Chief Complaint: No chief complaint on file.    HPI     HISTORY OF PRESENT ILLNESS:  Mrs. Russell is a 30-year-old white female who is [facility] for chronic low back pain.  Her past medical history is significant for interstitial cystitis, status post bladder stimulator in 2010 and subsequent removal, colon surgery in 2012 for bowel obstruction, TMJ dysfunction and trigeminal neuralgia.  Her last visit to the clinic was on 8/16/2019.  She was maintained on p.r.n. Oxycodone/APAP.    The patient has come to the clinic for followup.  Since her last visit, she has been followed up by Urology at [facility].  In 06/2019, she underwent surgery for interstitial cystitis.  In 07/2019, she underwent plastic surgery due to scar tissue in her previous surgical site.  Her her low back pain got worse and her pain medications were consequently increased.    The patient continues to complain of back pain.  It is a deep sensation of shattering her tailbone".  Over the past couple months, she has been having intermittent shooting pain to the left foot with tingling sensations.  Her pain is aggravated with prolonged standing, bending and lifting.  It is better with alternating heat or ice, lying down and pain medications.  Her maximum pain is 10/10 and minimum 3 to 4/10.  Today it is 6/10.  The patient complains of left lower extremity weakness.  She denies any bowel or bladder incontinence although she does intermittent bladder catheterization program due to occasional retention.    As noted previous visit, the patient is allergic/intolerant to multiple medications.  She is currently taking oxycodone/APAP 10/325 p.r.n. 6 times per day.  She has been trying to exercise regularly using a stationary bicycle.      Past Medical History:   Diagnosis Date    FHx: bowel obstruction     HA (headache)     Interstitial " cystitis     Ovarian cyst     Ulcer            Review of Systems   Constitutional: Positive for fatigue and fever. Negative for chills.   HENT: Negative for trouble swallowing.    Eyes: Positive for visual disturbance.   Respiratory: Negative for shortness of breath.    Cardiovascular: Negative for chest pain.   Gastrointestinal: Negative for blood in stool, constipation, nausea and vomiting.   Genitourinary: Positive for difficulty urinating.   Musculoskeletal: Positive for back pain and neck pain. Negative for arthralgias, gait problem and joint swelling.   Neurological: Positive for dizziness. Negative for headaches.   Psychiatric/Behavioral: Negative for behavioral problems and sleep disturbance.       Objective:      Physical Exam  Vitals signs reviewed.   Constitutional:       Appearance: She is well-developed.   HENT:      Head: Normocephalic and atraumatic.   Neck:      Musculoskeletal: Normal range of motion.      Comments: Mild tenderness.  Musculoskeletal:      Comments: BUE:  ROM:full.  Strength:    RUE: 5/5 at shoulder abduction, 5 elbow flexion, 5 elbow extension, 5 hand .   LUE: 5/5 at shoulder abduction, 5 elbow flexion, 5 elbow extension, 5 hand .  Sensation to pinprick:   RUE: intact.   LUE: intact.  DTR:    RUE: +2 biceps, +1 triceps.   LUE:  +2 biceps, +1 triceps.      BLE:  ROM:   RLE: full.   LLE: full.  Knee crepitus:   RLE: -ve.   LLE: -ve.   Strength:    RLE: 5/5 at hip flexion, 5 knee extension, 5 ankle DF, 5 PF, 5 EHL.   LLE: 5/5 at hip flexion, 5 knee extension, 5 ankle DF, 5 PF, 5 EHL.  Sensation to pinprick:     RLE: intact.      LLE: intact.   DTR:     RLE: +2 knee, +2 ankle.    LLE: +2 knee, +2 ankle.  Clonus:    Rt ankle: -ve.    Lt ankle: -ve.  SLR:      RLE: -ve at 60 degrees.      LLE: -ve at 60 degrees.   TITA:     RLE: -ve.      LLE: -ve.  SI tenderness:     RLE: -ve.      LLE: -ve.    Healed scar over Rt buttock.    +ve mild tenderness over low lumbar spine.    Gait:  WNL     Skin:     General: Skin is warm.   Neurological:      Mental Status: She is alert and oriented to person, place, and time.   Psychiatric:         Behavior: Behavior normal.             Assessment:       1. Chronic bilateral low back pain with left-sided sciatica    2. Chronic use of opiate for therapeutic purpose        Plan:       - MRI of lumbar spine was ordered (an open MRI was requested outside [facility] due to the patient's claustrophobia).  - Continue oxyCODONE-acetaminophen (PERCOCET)  mg per tablet; Take 1 tablet by mouth every 4 hours as needed for Pain.  Plan to decrease her maximum 5 times per day at next refill time.  - Pain Clinic Drug Screen; Future  - Regular home exercise program was encouraged.  - No follow-ups on file.     This was a 25 minute visit, more than 50% of which was spent counseling the patient about the diagnosis and the treatment plan.

## 2020-09-11 ENCOUNTER — TELEPHONE (OUTPATIENT)
Dept: PHYSICAL MEDICINE AND REHAB | Facility: CLINIC | Age: 30
End: 2020-09-11

## 2020-09-11 ENCOUNTER — PATIENT MESSAGE (OUTPATIENT)
Dept: PHYSICAL MEDICINE AND REHAB | Facility: CLINIC | Age: 30
End: 2020-09-11

## 2020-09-11 DIAGNOSIS — M54.50 CHRONIC BILATERAL LOW BACK PAIN WITHOUT SCIATICA: ICD-10-CM

## 2020-09-11 DIAGNOSIS — G89.29 CHRONIC BILATERAL LOW BACK PAIN WITHOUT SCIATICA: ICD-10-CM

## 2020-09-11 RX ORDER — PREGABALIN 50 MG/1
50 CAPSULE ORAL 3 TIMES DAILY
Qty: 90 CAPSULE | Refills: 2 | Status: SHIPPED | OUTPATIENT
Start: 2020-09-12 | End: 2021-04-01

## 2020-09-11 RX ORDER — OXYCODONE AND ACETAMINOPHEN 10; 325 MG/1; MG/1
1 TABLET ORAL
Qty: 150 TABLET | Refills: 0 | Status: SHIPPED | OUTPATIENT
Start: 2020-09-12 | End: 2020-10-12 | Stop reason: SDUPTHER

## 2020-09-11 NOTE — TELEPHONE ENCOUNTER
Called and spoke with patient.  Please send both Rx refills to [facility] tomorrow before They close @ 2:00.    Patient was informed that her refill will be sent tomorrow.

## 2020-10-12 ENCOUNTER — PATIENT MESSAGE (OUTPATIENT)
Dept: PHYSICAL MEDICINE AND REHAB | Facility: CLINIC | Age: 30
End: 2020-10-12

## 2020-10-12 DIAGNOSIS — M54.50 CHRONIC BILATERAL LOW BACK PAIN WITHOUT SCIATICA: ICD-10-CM

## 2020-10-12 DIAGNOSIS — G89.29 CHRONIC BILATERAL LOW BACK PAIN WITHOUT SCIATICA: ICD-10-CM

## 2020-10-12 RX ORDER — OXYCODONE AND ACETAMINOPHEN 10; 325 MG/1; MG/1
1 TABLET ORAL EVERY 4 HOURS PRN
Qty: 180 TABLET | Refills: 0 | Status: SHIPPED | OUTPATIENT
Start: 2020-10-13 | End: 2020-11-11 | Stop reason: SDUPTHER

## 2020-10-12 NOTE — TELEPHONE ENCOUNTER
Last Rx refill-----09/11/20  Last office visit--09/10/20  Next office visit--02/08/21      [facility] location

## 2020-10-13 ENCOUNTER — PATIENT MESSAGE (OUTPATIENT)
Dept: PHYSICAL MEDICINE AND REHAB | Facility: CLINIC | Age: 30
End: 2020-10-13

## 2020-10-13 ENCOUNTER — TELEPHONE (OUTPATIENT)
Dept: PHYSICAL MEDICINE AND REHAB | Facility: CLINIC | Age: 30
End: 2020-10-13

## 2020-10-13 NOTE — TELEPHONE ENCOUNTER
----- Message from Ton Danielle sent at 10/13/2020 12:57 PM CDT -----  Pt asking for a callback regarding to her medication that she haven't received yet she's in recovery from surgery and she says she really need her medication please contact pt             Contact info [phone]

## 2020-10-13 NOTE — TELEPHONE ENCOUNTER
Called and spoke with patient.  Patient would like the doctor to contact her because she needs to know why her Rx refill was not at the Pharmacy on yesterday.    Called [facility] in [place] @ [phone], patient Rx refill has been there waiting since yesterday for .  Called patient back to inform her, her number is busy.

## 2020-10-14 ENCOUNTER — TELEPHONE (OUTPATIENT)
Dept: HEPATOLOGY | Facility: CLINIC | Age: 30
End: 2020-10-14

## 2020-10-14 NOTE — TELEPHONE ENCOUNTER
I spoke with patient.  She states that she needs to setup an appt with hematology not hepatology.  Patient transferred to that department for scheduling.

## 2020-10-14 NOTE — TELEPHONE ENCOUNTER
----- Message from Med Domínguez sent at 10/14/2020  3:21 PM CDT -----  Regarding: appointment access   called in to speak with someone at the office regarding scheduling an appointment. She would like a call back from the office and can be reached at    [phone]

## 2020-10-27 ENCOUNTER — TELEPHONE (OUTPATIENT)
Dept: NEUROLOGY | Facility: CLINIC | Age: 30
End: 2020-10-27

## 2020-10-27 NOTE — TELEPHONE ENCOUNTER
----- Message from Mariann Wood sent at 10/27/2020  1:48 PM CDT -----  Regarding: pt records  Contact: pt  Pt called states she needs her medical records from you I stated to the pt I could transfer her to medical records she stated she wants the records from your office with your notes your specifie appt notes diagnosis      Faxed to Dr Ervin doyle [phone] fax [phone]         Please call pt after this is taken care off    Pt can be reached at [phone]

## 2020-11-11 ENCOUNTER — PATIENT MESSAGE (OUTPATIENT)
Dept: PHYSICAL MEDICINE AND REHAB | Facility: CLINIC | Age: 30
End: 2020-11-11

## 2020-11-11 DIAGNOSIS — M54.50 CHRONIC BILATERAL LOW BACK PAIN WITHOUT SCIATICA: ICD-10-CM

## 2020-11-11 DIAGNOSIS — G89.29 CHRONIC BILATERAL LOW BACK PAIN WITHOUT SCIATICA: ICD-10-CM

## 2020-11-11 RX ORDER — OXYCODONE AND ACETAMINOPHEN 10; 325 MG/1; MG/1
1 TABLET ORAL EVERY 4 HOURS PRN
Qty: 180 TABLET | Refills: 0 | Status: SHIPPED | OUTPATIENT
Start: 2020-11-12 | End: 2020-11-12 | Stop reason: SDUPTHER

## 2020-11-12 ENCOUNTER — PATIENT MESSAGE (OUTPATIENT)
Dept: PHYSICAL MEDICINE AND REHAB | Facility: CLINIC | Age: 30
End: 2020-11-12

## 2020-11-12 ENCOUNTER — TELEPHONE (OUTPATIENT)
Dept: PHYSICAL MEDICINE AND REHAB | Facility: CLINIC | Age: 30
End: 2020-11-12

## 2020-11-12 DIAGNOSIS — G89.29 CHRONIC BILATERAL LOW BACK PAIN WITHOUT SCIATICA: ICD-10-CM

## 2020-11-12 DIAGNOSIS — M54.50 CHRONIC BILATERAL LOW BACK PAIN WITHOUT SCIATICA: ICD-10-CM

## 2020-11-12 RX ORDER — OXYCODONE AND ACETAMINOPHEN 10; 325 MG/1; MG/1
1 TABLET ORAL EVERY 4 HOURS PRN
Qty: 180 TABLET | Refills: 0 | Status: SHIPPED | OUTPATIENT
Start: 2020-11-12 | End: 2020-12-09 | Stop reason: SDUPTHER

## 2020-11-12 NOTE — TELEPHONE ENCOUNTER
----- Message from Lilli Rivera sent at 11/12/2020  1:52 PM CST -----  Contact: self @ [phone]  Pt says her pharmacy told her there is an error with her oxycodone 10/325 prescription.  Pt would like you to call her and she will explain because it may get confusing.

## 2020-11-12 NOTE — TELEPHONE ENCOUNTER
Lilli ZAMARRIPA Staff   Caller: self @ [phone] (Today,  1:52 PM)             Pt says her pharmacy told her there is an error with her oxycodone 10/325 prescription.  Pt would like you to call her and she will explain because it may get confusing.        Called and spoke with patient.  Patient states she is unable to receive her Rx Oxycodone due to the direction.    Summary: Take 1 tablet by mouth every 4 (four) hours as needed for Pain (maximum 5 tablets per day)., Starting u 11/12/2020, Normal    Called [facility] in [place] and spoke with the Pharmacist, Mrs Johnson.  Pharmacy stated Rx Oxycodone was written the same as last month ,Take 1 tablet by mouth every 4 (four) hours as needed for Pain (maximum 5 tablets per day.  The directions ( maximum 5 tablets per day) place her with a 36 day supplies instead of 30.  Patient will have to receive the medication on 11/17/20 instead of today.  Please change the directions on the medication to reflect her 30 day supply.

## 2020-11-17 ENCOUNTER — PATIENT MESSAGE (OUTPATIENT)
Dept: NEUROLOGY | Facility: CLINIC | Age: 30
End: 2020-11-17

## 2020-12-09 DIAGNOSIS — G89.29 CHRONIC BILATERAL LOW BACK PAIN WITHOUT SCIATICA: ICD-10-CM

## 2020-12-09 DIAGNOSIS — M54.50 CHRONIC BILATERAL LOW BACK PAIN WITHOUT SCIATICA: ICD-10-CM

## 2020-12-09 RX ORDER — OXYCODONE AND ACETAMINOPHEN 10; 325 MG/1; MG/1
1 TABLET ORAL EVERY 4 HOURS PRN
Qty: 180 TABLET | Refills: 0 | Status: SHIPPED | OUTPATIENT
Start: 2020-12-12 | End: 2020-12-11 | Stop reason: SDUPTHER

## 2020-12-11 ENCOUNTER — PATIENT MESSAGE (OUTPATIENT)
Dept: PHYSICAL MEDICINE AND REHAB | Facility: CLINIC | Age: 30
End: 2020-12-11

## 2020-12-11 DIAGNOSIS — G89.29 CHRONIC BILATERAL LOW BACK PAIN WITHOUT SCIATICA: ICD-10-CM

## 2020-12-11 DIAGNOSIS — M54.50 CHRONIC BILATERAL LOW BACK PAIN WITHOUT SCIATICA: ICD-10-CM

## 2020-12-11 RX ORDER — OXYCODONE AND ACETAMINOPHEN 10; 325 MG/1; MG/1
1 TABLET ORAL EVERY 4 HOURS PRN
Qty: 180 TABLET | Refills: 0 | Status: SHIPPED | OUTPATIENT
Start: 2020-12-11 | End: 2021-01-07 | Stop reason: SDUPTHER

## 2021-01-07 DIAGNOSIS — G89.29 CHRONIC BILATERAL LOW BACK PAIN WITHOUT SCIATICA: ICD-10-CM

## 2021-01-07 DIAGNOSIS — M54.50 CHRONIC BILATERAL LOW BACK PAIN WITHOUT SCIATICA: ICD-10-CM

## 2021-01-07 RX ORDER — OXYCODONE AND ACETAMINOPHEN 10; 325 MG/1; MG/1
1 TABLET ORAL EVERY 4 HOURS PRN
Qty: 180 TABLET | Refills: 0 | Status: SHIPPED | OUTPATIENT
Start: 2021-02-10 | End: 2021-01-08 | Stop reason: SDUPTHER

## 2021-01-08 ENCOUNTER — PATIENT MESSAGE (OUTPATIENT)
Dept: PHYSICAL MEDICINE AND REHAB | Facility: CLINIC | Age: 31
End: 2021-01-08

## 2021-01-08 DIAGNOSIS — M54.50 CHRONIC BILATERAL LOW BACK PAIN WITHOUT SCIATICA: ICD-10-CM

## 2021-01-08 DIAGNOSIS — G89.29 CHRONIC BILATERAL LOW BACK PAIN WITHOUT SCIATICA: ICD-10-CM

## 2021-01-08 RX ORDER — OXYCODONE AND ACETAMINOPHEN 10; 325 MG/1; MG/1
1 TABLET ORAL EVERY 4 HOURS PRN
Qty: 180 TABLET | Refills: 0 | Status: CANCELLED | OUTPATIENT
Start: 2021-02-10

## 2021-01-08 RX ORDER — OXYCODONE AND ACETAMINOPHEN 10; 325 MG/1; MG/1
1 TABLET ORAL EVERY 4 HOURS PRN
Qty: 180 TABLET | Refills: 0 | Status: SHIPPED | OUTPATIENT
Start: 2021-01-10 | End: 2021-02-10 | Stop reason: SDUPTHER

## 2021-01-08 RX ORDER — OXYCODONE AND ACETAMINOPHEN 10; 325 MG/1; MG/1
1 TABLET ORAL EVERY 4 HOURS PRN
Qty: 180 TABLET | Refills: 0 | OUTPATIENT
Start: 2021-01-08

## 2021-02-08 ENCOUNTER — OFFICE VISIT (OUTPATIENT)
Dept: PHYSICAL MEDICINE AND REHAB | Facility: CLINIC | Age: 31
End: 2021-02-08
Payer: MEDICARE

## 2021-02-08 ENCOUNTER — PATIENT MESSAGE (OUTPATIENT)
Dept: PHYSICAL MEDICINE AND REHAB | Facility: CLINIC | Age: 31
End: 2021-02-08

## 2021-02-08 ENCOUNTER — TELEPHONE (OUTPATIENT)
Dept: PHYSICAL MEDICINE AND REHAB | Facility: CLINIC | Age: 31
End: 2021-02-08

## 2021-02-08 DIAGNOSIS — M54.41 CHRONIC BILATERAL LOW BACK PAIN WITH RIGHT-SIDED SCIATICA: Primary | ICD-10-CM

## 2021-02-08 DIAGNOSIS — G89.29 CHRONIC BILATERAL LOW BACK PAIN WITH RIGHT-SIDED SCIATICA: Primary | ICD-10-CM

## 2021-02-08 DIAGNOSIS — Z79.891 CHRONIC USE OF OPIATE FOR THERAPEUTIC PURPOSE: ICD-10-CM

## 2021-02-08 DIAGNOSIS — M54.9 DORSALGIA, UNSPECIFIED: ICD-10-CM

## 2021-02-08 PROCEDURE — 99443 PR PHYSICIAN TELEPHONE EVALUATION 21-30 MIN: CPT | Mod: 95,,, | Performed by: PHYSICAL MEDICINE & REHABILITATION

## 2021-02-08 PROCEDURE — 99443 PR PHYSICIAN TELEPHONE EVALUATION 21-30 MIN: ICD-10-PCS | Mod: 95,,, | Performed by: PHYSICAL MEDICINE & REHABILITATION

## 2021-02-10 ENCOUNTER — PATIENT MESSAGE (OUTPATIENT)
Dept: PHYSICAL MEDICINE AND REHAB | Facility: CLINIC | Age: 31
End: 2021-02-10

## 2021-02-10 DIAGNOSIS — M54.50 CHRONIC BILATERAL LOW BACK PAIN WITHOUT SCIATICA: ICD-10-CM

## 2021-02-10 DIAGNOSIS — G89.29 CHRONIC BILATERAL LOW BACK PAIN WITHOUT SCIATICA: ICD-10-CM

## 2021-02-10 RX ORDER — OXYCODONE AND ACETAMINOPHEN 10; 325 MG/1; MG/1
1 TABLET ORAL EVERY 4 HOURS PRN
Qty: 180 TABLET | Refills: 0 | Status: SHIPPED | OUTPATIENT
Start: 2021-02-10 | End: 2021-03-09 | Stop reason: SDUPTHER

## 2021-02-11 ENCOUNTER — PATIENT MESSAGE (OUTPATIENT)
Dept: PHYSICAL MEDICINE AND REHAB | Facility: CLINIC | Age: 31
End: 2021-02-11

## 2021-03-04 ENCOUNTER — PATIENT MESSAGE (OUTPATIENT)
Dept: PHYSICAL MEDICINE AND REHAB | Facility: CLINIC | Age: 31
End: 2021-03-04

## 2021-03-09 DIAGNOSIS — M54.50 CHRONIC BILATERAL LOW BACK PAIN WITHOUT SCIATICA: ICD-10-CM

## 2021-03-09 DIAGNOSIS — G89.29 CHRONIC BILATERAL LOW BACK PAIN WITHOUT SCIATICA: ICD-10-CM

## 2021-03-09 RX ORDER — OXYCODONE AND ACETAMINOPHEN 10; 325 MG/1; MG/1
1 TABLET ORAL EVERY 4 HOURS PRN
Qty: 180 TABLET | Refills: 0 | Status: CANCELLED | OUTPATIENT
Start: 2021-03-09

## 2021-03-09 RX ORDER — OXYCODONE AND ACETAMINOPHEN 10; 325 MG/1; MG/1
1 TABLET ORAL EVERY 4 HOURS PRN
Qty: 180 TABLET | Refills: 0 | Status: SHIPPED | OUTPATIENT
Start: 2021-03-12 | End: 2021-04-08 | Stop reason: SDUPTHER

## 2021-03-23 ENCOUNTER — PATIENT MESSAGE (OUTPATIENT)
Dept: PHYSICAL MEDICINE AND REHAB | Facility: CLINIC | Age: 31
End: 2021-03-23

## 2021-04-08 DIAGNOSIS — G89.29 CHRONIC BILATERAL LOW BACK PAIN WITHOUT SCIATICA: ICD-10-CM

## 2021-04-08 DIAGNOSIS — M54.50 CHRONIC BILATERAL LOW BACK PAIN WITHOUT SCIATICA: ICD-10-CM

## 2021-04-09 ENCOUNTER — PATIENT MESSAGE (OUTPATIENT)
Dept: PHYSICAL MEDICINE AND REHAB | Facility: CLINIC | Age: 31
End: 2021-04-09

## 2021-04-09 RX ORDER — OXYCODONE AND ACETAMINOPHEN 10; 325 MG/1; MG/1
1 TABLET ORAL EVERY 4 HOURS PRN
Qty: 180 TABLET | Refills: 0 | Status: SHIPPED | OUTPATIENT
Start: 2021-04-09 | End: 2021-04-10 | Stop reason: SDUPTHER

## 2021-04-10 DIAGNOSIS — M54.50 CHRONIC BILATERAL LOW BACK PAIN WITHOUT SCIATICA: ICD-10-CM

## 2021-04-10 DIAGNOSIS — G89.29 CHRONIC BILATERAL LOW BACK PAIN WITHOUT SCIATICA: ICD-10-CM

## 2021-04-10 RX ORDER — OXYCODONE AND ACETAMINOPHEN 10; 325 MG/1; MG/1
1 TABLET ORAL EVERY 4 HOURS PRN
Qty: 180 TABLET | Refills: 0 | Status: SHIPPED | OUTPATIENT
Start: 2021-04-10 | End: 2021-04-12 | Stop reason: SDUPTHER

## 2021-04-11 ENCOUNTER — PATIENT MESSAGE (OUTPATIENT)
Dept: PHYSICAL MEDICINE AND REHAB | Facility: CLINIC | Age: 31
End: 2021-04-11

## 2021-04-12 ENCOUNTER — PATIENT MESSAGE (OUTPATIENT)
Dept: PHYSICAL MEDICINE AND REHAB | Facility: CLINIC | Age: 31
End: 2021-04-12

## 2021-04-12 ENCOUNTER — TELEPHONE (OUTPATIENT)
Dept: PHYSICAL MEDICINE AND REHAB | Facility: CLINIC | Age: 31
End: 2021-04-12

## 2021-04-12 DIAGNOSIS — M54.50 CHRONIC BILATERAL LOW BACK PAIN WITHOUT SCIATICA: ICD-10-CM

## 2021-04-12 DIAGNOSIS — G89.29 CHRONIC BILATERAL LOW BACK PAIN WITHOUT SCIATICA: ICD-10-CM

## 2021-04-13 RX ORDER — OXYCODONE AND ACETAMINOPHEN 10; 325 MG/1; MG/1
1 TABLET ORAL EVERY 4 HOURS PRN
Qty: 180 TABLET | Refills: 0 | Status: SHIPPED | OUTPATIENT
Start: 2021-04-13 | End: 2021-05-10 | Stop reason: SDUPTHER

## 2021-04-16 ENCOUNTER — PATIENT MESSAGE (OUTPATIENT)
Dept: RESEARCH | Facility: HOSPITAL | Age: 31
End: 2021-04-16

## 2021-04-30 ENCOUNTER — OFFICE VISIT (OUTPATIENT)
Dept: OBSTETRICS AND GYNECOLOGY | Facility: CLINIC | Age: 31
End: 2021-04-30
Payer: MEDICARE

## 2021-04-30 VITALS
BODY MASS INDEX: 28.44 KG/M2 | SYSTOLIC BLOOD PRESSURE: 120 MMHG | HEIGHT: 63 IN | DIASTOLIC BLOOD PRESSURE: 78 MMHG | WEIGHT: 160.5 LBS

## 2021-04-30 DIAGNOSIS — Z12.4 SCREENING FOR CERVICAL CANCER: ICD-10-CM

## 2021-04-30 DIAGNOSIS — Z87.42 HISTORY OF OVARIAN CYST: ICD-10-CM

## 2021-04-30 DIAGNOSIS — Z01.419 WELL WOMAN EXAM WITH ROUTINE GYNECOLOGICAL EXAM: Primary | ICD-10-CM

## 2021-04-30 DIAGNOSIS — N89.8 VAGINAL DISCHARGE: ICD-10-CM

## 2021-04-30 PROCEDURE — 99213 OFFICE O/P EST LOW 20 MIN: CPT | Mod: PBBFAC,PO | Performed by: OBSTETRICS & GYNECOLOGY

## 2021-04-30 PROCEDURE — 87661 TRICHOMONAS VAGINALIS AMPLIF: CPT | Mod: 59 | Performed by: OBSTETRICS & GYNECOLOGY

## 2021-04-30 PROCEDURE — 88142 CYTOPATH C/V THIN LAYER: CPT | Performed by: OBSTETRICS & GYNECOLOGY

## 2021-04-30 PROCEDURE — 99999 PR PBB SHADOW E&M-EST. PATIENT-LVL III: CPT | Mod: PBBFAC,,, | Performed by: OBSTETRICS & GYNECOLOGY

## 2021-04-30 PROCEDURE — G0101 PR CA SCREEN;PELVIC/BREAST EXAM: ICD-10-PCS | Mod: S$PBB,,, | Performed by: OBSTETRICS & GYNECOLOGY

## 2021-04-30 PROCEDURE — 87624 HPV HI-RISK TYP POOLED RSLT: CPT | Performed by: OBSTETRICS & GYNECOLOGY

## 2021-04-30 PROCEDURE — 87481 CANDIDA DNA AMP PROBE: CPT | Mod: 59 | Performed by: OBSTETRICS & GYNECOLOGY

## 2021-04-30 PROCEDURE — 99999 PR PBB SHADOW E&M-EST. PATIENT-LVL III: ICD-10-PCS | Mod: PBBFAC,,, | Performed by: OBSTETRICS & GYNECOLOGY

## 2021-04-30 PROCEDURE — G0101 CA SCREEN;PELVIC/BREAST EXAM: HCPCS | Mod: S$PBB,,, | Performed by: OBSTETRICS & GYNECOLOGY

## 2021-04-30 RX ORDER — ETONOGESTREL AND ETHINYL ESTRADIOL VAGINAL RING .015; .12 MG/D; MG/D
1 RING VAGINAL
Qty: 3 EACH | Refills: 4 | Status: SHIPPED | OUTPATIENT
Start: 2021-04-30 | End: 2021-11-22

## 2021-05-03 LAB
BACTERIAL VAGINOSIS DNA: NEGATIVE
CANDIDA GLABRATA DNA: POSITIVE
CANDIDA KRUSEI DNA: NEGATIVE
CANDIDA RRNA VAG QL PROBE: NEGATIVE
T VAGINALIS RRNA GENITAL QL PROBE: NEGATIVE

## 2021-05-03 RX ORDER — TERCONAZOLE 4 MG/G
1 CREAM VAGINAL NIGHTLY
Qty: 45 G | Refills: 0 | Status: SHIPPED | OUTPATIENT
Start: 2021-05-03 | End: 2021-05-10

## 2021-05-07 LAB
FINAL PATHOLOGIC DIAGNOSIS: NORMAL
Lab: NORMAL

## 2021-05-10 DIAGNOSIS — M54.50 CHRONIC BILATERAL LOW BACK PAIN WITHOUT SCIATICA: ICD-10-CM

## 2021-05-10 DIAGNOSIS — G89.29 CHRONIC BILATERAL LOW BACK PAIN WITHOUT SCIATICA: ICD-10-CM

## 2021-05-11 LAB
HPV HR 12 DNA SPEC QL NAA+PROBE: NEGATIVE
HPV16 AG SPEC QL: NEGATIVE
HPV18 DNA SPEC QL NAA+PROBE: NEGATIVE

## 2021-05-11 RX ORDER — OXYCODONE AND ACETAMINOPHEN 10; 325 MG/1; MG/1
1 TABLET ORAL EVERY 4 HOURS PRN
Qty: 180 TABLET | Refills: 0 | Status: SHIPPED | OUTPATIENT
Start: 2021-05-13 | End: 2021-06-10 | Stop reason: SDUPTHER

## 2021-06-10 ENCOUNTER — TELEPHONE (OUTPATIENT)
Dept: PHYSICAL MEDICINE AND REHAB | Facility: CLINIC | Age: 31
End: 2021-06-10

## 2021-06-10 ENCOUNTER — PATIENT MESSAGE (OUTPATIENT)
Dept: PHYSICAL MEDICINE AND REHAB | Facility: CLINIC | Age: 31
End: 2021-06-10

## 2021-06-10 DIAGNOSIS — G89.29 CHRONIC BILATERAL LOW BACK PAIN WITHOUT SCIATICA: ICD-10-CM

## 2021-06-10 DIAGNOSIS — M54.50 CHRONIC BILATERAL LOW BACK PAIN WITHOUT SCIATICA: ICD-10-CM

## 2021-06-10 RX ORDER — OXYCODONE AND ACETAMINOPHEN 10; 325 MG/1; MG/1
1 TABLET ORAL EVERY 4 HOURS PRN
Qty: 180 TABLET | Refills: 0 | Status: SHIPPED | OUTPATIENT
Start: 2021-06-11 | End: 2021-07-12 | Stop reason: SDUPTHER

## 2021-06-20 ENCOUNTER — PATIENT MESSAGE (OUTPATIENT)
Dept: PHYSICAL MEDICINE AND REHAB | Facility: CLINIC | Age: 31
End: 2021-06-20

## 2021-06-21 RX ORDER — ALPRAZOLAM 0.5 MG/1
0.5 TABLET ORAL 3 TIMES DAILY PRN
Qty: 45 TABLET | Refills: 0 | Status: SHIPPED | OUTPATIENT
Start: 2021-06-21 | End: 2024-02-03

## 2021-06-22 ENCOUNTER — PATIENT MESSAGE (OUTPATIENT)
Dept: PHYSICAL MEDICINE AND REHAB | Facility: CLINIC | Age: 31
End: 2021-06-22

## 2021-07-11 ENCOUNTER — PATIENT MESSAGE (OUTPATIENT)
Dept: PHYSICAL MEDICINE AND REHAB | Facility: CLINIC | Age: 31
End: 2021-07-11

## 2021-07-12 ENCOUNTER — PATIENT MESSAGE (OUTPATIENT)
Dept: PHYSICAL MEDICINE AND REHAB | Facility: CLINIC | Age: 31
End: 2021-07-12

## 2021-07-12 ENCOUNTER — TELEPHONE (OUTPATIENT)
Dept: PHYSICAL MEDICINE AND REHAB | Facility: CLINIC | Age: 31
End: 2021-07-12

## 2021-07-12 DIAGNOSIS — M54.50 CHRONIC BILATERAL LOW BACK PAIN WITHOUT SCIATICA: ICD-10-CM

## 2021-07-12 DIAGNOSIS — G89.29 CHRONIC BILATERAL LOW BACK PAIN WITHOUT SCIATICA: ICD-10-CM

## 2021-07-12 RX ORDER — OXYCODONE AND ACETAMINOPHEN 10; 325 MG/1; MG/1
1 TABLET ORAL EVERY 4 HOURS PRN
Qty: 180 TABLET | Refills: 0 | Status: SHIPPED | OUTPATIENT
Start: 2021-07-12 | End: 2021-08-08 | Stop reason: SDUPTHER

## 2021-08-08 ENCOUNTER — PATIENT MESSAGE (OUTPATIENT)
Dept: PHYSICAL MEDICINE AND REHAB | Facility: CLINIC | Age: 31
End: 2021-08-08

## 2021-08-08 DIAGNOSIS — G89.29 CHRONIC BILATERAL LOW BACK PAIN WITHOUT SCIATICA: ICD-10-CM

## 2021-08-08 DIAGNOSIS — M54.50 CHRONIC BILATERAL LOW BACK PAIN WITHOUT SCIATICA: ICD-10-CM

## 2021-08-09 RX ORDER — OXYCODONE AND ACETAMINOPHEN 10; 325 MG/1; MG/1
1 TABLET ORAL EVERY 4 HOURS PRN
Qty: 180 TABLET | Refills: 0 | Status: SHIPPED | OUTPATIENT
Start: 2021-08-11 | End: 2021-09-06 | Stop reason: SDUPTHER

## 2021-09-06 DIAGNOSIS — G89.29 CHRONIC BILATERAL LOW BACK PAIN WITHOUT SCIATICA: ICD-10-CM

## 2021-09-06 DIAGNOSIS — M54.50 CHRONIC BILATERAL LOW BACK PAIN WITHOUT SCIATICA: ICD-10-CM

## 2021-09-07 RX ORDER — OXYCODONE AND ACETAMINOPHEN 10; 325 MG/1; MG/1
1 TABLET ORAL EVERY 4 HOURS PRN
Qty: 180 TABLET | Refills: 0 | Status: SHIPPED | OUTPATIENT
Start: 2021-09-10 | End: 2021-10-06 | Stop reason: SDUPTHER

## 2021-10-06 DIAGNOSIS — M54.50 CHRONIC BILATERAL LOW BACK PAIN WITHOUT SCIATICA: ICD-10-CM

## 2021-10-06 DIAGNOSIS — G89.29 CHRONIC BILATERAL LOW BACK PAIN WITHOUT SCIATICA: ICD-10-CM

## 2021-10-06 RX ORDER — OXYCODONE AND ACETAMINOPHEN 10; 325 MG/1; MG/1
1 TABLET ORAL EVERY 4 HOURS PRN
Qty: 180 TABLET | Refills: 0 | Status: SHIPPED | OUTPATIENT
Start: 2021-10-09 | End: 2021-11-08 | Stop reason: SDUPTHER

## 2021-11-08 DIAGNOSIS — M54.50 CHRONIC BILATERAL LOW BACK PAIN WITHOUT SCIATICA: ICD-10-CM

## 2021-11-08 DIAGNOSIS — G89.29 CHRONIC BILATERAL LOW BACK PAIN WITHOUT SCIATICA: ICD-10-CM

## 2021-11-08 RX ORDER — OXYCODONE AND ACETAMINOPHEN 10; 325 MG/1; MG/1
1 TABLET ORAL EVERY 4 HOURS PRN
Qty: 180 TABLET | Refills: 0 | Status: SHIPPED | OUTPATIENT
Start: 2021-11-10 | End: 2021-12-07 | Stop reason: SDUPTHER

## 2021-11-12 ENCOUNTER — TELEPHONE (OUTPATIENT)
Dept: OBSTETRICS AND GYNECOLOGY | Facility: CLINIC | Age: 31
End: 2021-11-12
Payer: MEDICARE

## 2021-11-12 RX ORDER — ETONOGESTREL AND ETHINYL ESTRADIOL VAGINAL RING .015; .12 MG/D; MG/D
1 RING VAGINAL
Qty: 3 EACH | Refills: 4 | Status: SHIPPED | OUTPATIENT
Start: 2021-11-12 | End: 2021-11-22

## 2021-11-19 ENCOUNTER — TELEPHONE (OUTPATIENT)
Dept: OBSTETRICS AND GYNECOLOGY | Facility: CLINIC | Age: 31
End: 2021-11-19
Payer: MEDICARE

## 2021-11-22 ENCOUNTER — TELEPHONE (OUTPATIENT)
Dept: OBSTETRICS AND GYNECOLOGY | Facility: CLINIC | Age: 31
End: 2021-11-22
Payer: MEDICARE

## 2021-11-22 ENCOUNTER — TELEPHONE (OUTPATIENT)
Dept: PHARMACY | Facility: CLINIC | Age: 31
End: 2021-11-22
Payer: MEDICARE

## 2021-11-22 RX ORDER — ETONOGESTREL AND ETHINYL ESTRADIOL VAGINAL RING .015; .12 MG/D; MG/D
1 RING VAGINAL
Qty: 3 EACH | Refills: 4 | Status: SHIPPED | OUTPATIENT
Start: 2021-11-22 | End: 2021-11-22

## 2021-11-22 RX ORDER — ETONOGESTREL AND ETHINYL ESTRADIOL VAGINAL RING .015; .12 MG/D; MG/D
1 RING VAGINAL
Qty: 3 EACH | Refills: 4 | Status: SHIPPED | OUTPATIENT
Start: 2021-11-22 | End: 2022-12-08 | Stop reason: SDUPTHER

## 2021-11-23 ENCOUNTER — PATIENT MESSAGE (OUTPATIENT)
Dept: OBSTETRICS AND GYNECOLOGY | Facility: CLINIC | Age: 31
End: 2021-11-23
Payer: MEDICARE

## 2021-12-07 DIAGNOSIS — G89.29 CHRONIC BILATERAL LOW BACK PAIN WITHOUT SCIATICA: ICD-10-CM

## 2021-12-07 DIAGNOSIS — M54.50 CHRONIC BILATERAL LOW BACK PAIN WITHOUT SCIATICA: ICD-10-CM

## 2021-12-07 RX ORDER — OXYCODONE AND ACETAMINOPHEN 10; 325 MG/1; MG/1
1 TABLET ORAL EVERY 4 HOURS PRN
Qty: 180 TABLET | Refills: 0 | Status: SHIPPED | OUTPATIENT
Start: 2021-12-10 | End: 2022-01-05 | Stop reason: SDUPTHER

## 2021-12-08 ENCOUNTER — PATIENT MESSAGE (OUTPATIENT)
Dept: PHYSICAL MEDICINE AND REHAB | Facility: CLINIC | Age: 31
End: 2021-12-08
Payer: MEDICARE

## 2022-01-05 DIAGNOSIS — G89.29 CHRONIC BILATERAL LOW BACK PAIN WITHOUT SCIATICA: ICD-10-CM

## 2022-01-05 DIAGNOSIS — M54.50 CHRONIC BILATERAL LOW BACK PAIN WITHOUT SCIATICA: ICD-10-CM

## 2022-01-06 RX ORDER — OXYCODONE AND ACETAMINOPHEN 10; 325 MG/1; MG/1
1 TABLET ORAL EVERY 4 HOURS PRN
Qty: 180 TABLET | Refills: 0 | Status: SHIPPED | OUTPATIENT
Start: 2022-01-07 | End: 2022-02-07 | Stop reason: SDUPTHER

## 2022-02-07 DIAGNOSIS — M54.50 CHRONIC BILATERAL LOW BACK PAIN WITHOUT SCIATICA: ICD-10-CM

## 2022-02-07 DIAGNOSIS — G89.29 CHRONIC BILATERAL LOW BACK PAIN WITHOUT SCIATICA: ICD-10-CM

## 2022-02-07 RX ORDER — OXYCODONE AND ACETAMINOPHEN 10; 325 MG/1; MG/1
1 TABLET ORAL EVERY 4 HOURS PRN
Qty: 180 TABLET | Refills: 0 | Status: CANCELLED | OUTPATIENT
Start: 2022-02-07 | End: 2022-03-09

## 2022-02-08 DIAGNOSIS — M54.50 CHRONIC BILATERAL LOW BACK PAIN WITHOUT SCIATICA: ICD-10-CM

## 2022-02-08 DIAGNOSIS — G89.29 CHRONIC BILATERAL LOW BACK PAIN WITHOUT SCIATICA: ICD-10-CM

## 2022-02-08 RX ORDER — OXYCODONE AND ACETAMINOPHEN 10; 325 MG/1; MG/1
1 TABLET ORAL EVERY 4 HOURS PRN
Qty: 180 TABLET | Refills: 0 | Status: SHIPPED | OUTPATIENT
Start: 2022-02-09 | End: 2022-03-08 | Stop reason: SDUPTHER

## 2022-03-08 DIAGNOSIS — G89.29 CHRONIC BILATERAL LOW BACK PAIN WITHOUT SCIATICA: ICD-10-CM

## 2022-03-08 DIAGNOSIS — M54.50 CHRONIC BILATERAL LOW BACK PAIN WITHOUT SCIATICA: ICD-10-CM

## 2022-03-09 RX ORDER — OXYCODONE AND ACETAMINOPHEN 10; 325 MG/1; MG/1
1 TABLET ORAL EVERY 4 HOURS PRN
Qty: 180 TABLET | Refills: 0 | Status: SHIPPED | OUTPATIENT
Start: 2022-03-12 | End: 2022-03-11 | Stop reason: SDUPTHER

## 2022-03-11 ENCOUNTER — PATIENT MESSAGE (OUTPATIENT)
Dept: PHYSICAL MEDICINE AND REHAB | Facility: CLINIC | Age: 32
End: 2022-03-11
Payer: MEDICARE

## 2022-03-11 DIAGNOSIS — M54.50 CHRONIC BILATERAL LOW BACK PAIN WITHOUT SCIATICA: ICD-10-CM

## 2022-03-11 DIAGNOSIS — G89.29 CHRONIC BILATERAL LOW BACK PAIN WITHOUT SCIATICA: ICD-10-CM

## 2022-03-11 RX ORDER — OXYCODONE AND ACETAMINOPHEN 10; 325 MG/1; MG/1
1 TABLET ORAL EVERY 4 HOURS PRN
Qty: 180 TABLET | Refills: 0 | Status: SHIPPED | OUTPATIENT
Start: 2022-03-11 | End: 2022-04-06 | Stop reason: SDUPTHER

## 2022-03-18 ENCOUNTER — PES CALL (OUTPATIENT)
Dept: ADMINISTRATIVE | Facility: CLINIC | Age: 32
End: 2022-03-18
Payer: MEDICARE

## 2022-04-06 DIAGNOSIS — G89.29 CHRONIC BILATERAL LOW BACK PAIN WITHOUT SCIATICA: ICD-10-CM

## 2022-04-06 DIAGNOSIS — M54.50 CHRONIC BILATERAL LOW BACK PAIN WITHOUT SCIATICA: ICD-10-CM

## 2022-04-06 RX ORDER — OXYCODONE AND ACETAMINOPHEN 10; 325 MG/1; MG/1
1 TABLET ORAL EVERY 4 HOURS PRN
Qty: 180 TABLET | Refills: 0 | Status: SHIPPED | OUTPATIENT
Start: 2022-04-11 | End: 2022-05-09 | Stop reason: SDUPTHER

## 2022-05-09 DIAGNOSIS — G89.29 CHRONIC BILATERAL LOW BACK PAIN WITHOUT SCIATICA: ICD-10-CM

## 2022-05-09 DIAGNOSIS — M54.50 CHRONIC BILATERAL LOW BACK PAIN WITHOUT SCIATICA: ICD-10-CM

## 2022-05-09 RX ORDER — OXYCODONE AND ACETAMINOPHEN 10; 325 MG/1; MG/1
1 TABLET ORAL EVERY 4 HOURS PRN
Qty: 180 TABLET | Refills: 0 | Status: SHIPPED | OUTPATIENT
Start: 2022-05-13 | End: 2022-06-09 | Stop reason: SDUPTHER

## 2022-06-09 DIAGNOSIS — G89.29 CHRONIC BILATERAL LOW BACK PAIN WITHOUT SCIATICA: ICD-10-CM

## 2022-06-09 DIAGNOSIS — M54.50 CHRONIC BILATERAL LOW BACK PAIN WITHOUT SCIATICA: ICD-10-CM

## 2022-06-09 RX ORDER — OXYCODONE AND ACETAMINOPHEN 10; 325 MG/1; MG/1
1 TABLET ORAL EVERY 4 HOURS PRN
Qty: 180 TABLET | Refills: 0 | Status: SHIPPED | OUTPATIENT
Start: 2022-06-11 | End: 2022-07-07 | Stop reason: SDUPTHER

## 2022-07-07 DIAGNOSIS — M54.50 CHRONIC BILATERAL LOW BACK PAIN WITHOUT SCIATICA: ICD-10-CM

## 2022-07-07 DIAGNOSIS — G89.29 CHRONIC BILATERAL LOW BACK PAIN WITHOUT SCIATICA: ICD-10-CM

## 2022-07-07 RX ORDER — OXYCODONE AND ACETAMINOPHEN 10; 325 MG/1; MG/1
1 TABLET ORAL EVERY 4 HOURS PRN
Qty: 180 TABLET | Refills: 0 | Status: SHIPPED | OUTPATIENT
Start: 2022-07-13 | End: 2022-08-10 | Stop reason: SDUPTHER

## 2022-08-10 DIAGNOSIS — G89.29 CHRONIC BILATERAL LOW BACK PAIN WITHOUT SCIATICA: ICD-10-CM

## 2022-08-10 DIAGNOSIS — M54.50 CHRONIC BILATERAL LOW BACK PAIN WITHOUT SCIATICA: ICD-10-CM

## 2022-08-10 RX ORDER — OXYCODONE AND ACETAMINOPHEN 10; 325 MG/1; MG/1
1 TABLET ORAL EVERY 4 HOURS PRN
Qty: 180 TABLET | Refills: 0 | Status: SHIPPED | OUTPATIENT
Start: 2022-08-12 | End: 2022-09-06 | Stop reason: SDUPTHER

## 2022-08-22 ENCOUNTER — PES CALL (OUTPATIENT)
Dept: ADMINISTRATIVE | Facility: OTHER | Age: 32
End: 2022-08-22
Payer: MEDICARE

## 2022-09-06 DIAGNOSIS — M54.50 CHRONIC BILATERAL LOW BACK PAIN WITHOUT SCIATICA: ICD-10-CM

## 2022-09-06 DIAGNOSIS — G89.29 CHRONIC BILATERAL LOW BACK PAIN WITHOUT SCIATICA: ICD-10-CM

## 2022-09-06 RX ORDER — OXYCODONE AND ACETAMINOPHEN 10; 325 MG/1; MG/1
1 TABLET ORAL EVERY 4 HOURS PRN
Qty: 180 TABLET | Refills: 0 | Status: SHIPPED | OUTPATIENT
Start: 2022-09-09 | End: 2022-10-13 | Stop reason: SDUPTHER

## 2022-10-08 DIAGNOSIS — M54.50 CHRONIC BILATERAL LOW BACK PAIN WITHOUT SCIATICA: ICD-10-CM

## 2022-10-08 DIAGNOSIS — G89.29 CHRONIC BILATERAL LOW BACK PAIN WITHOUT SCIATICA: ICD-10-CM

## 2022-10-08 RX ORDER — OXYCODONE AND ACETAMINOPHEN 10; 325 MG/1; MG/1
1 TABLET ORAL EVERY 4 HOURS PRN
Qty: 180 TABLET | Refills: 0 | Status: CANCELLED | OUTPATIENT
Start: 2022-10-08 | End: 2022-11-07

## 2022-10-12 ENCOUNTER — PATIENT MESSAGE (OUTPATIENT)
Dept: PHYSICAL MEDICINE AND REHAB | Facility: CLINIC | Age: 32
End: 2022-10-12
Payer: MEDICARE

## 2022-10-13 DIAGNOSIS — M54.50 CHRONIC BILATERAL LOW BACK PAIN WITHOUT SCIATICA: ICD-10-CM

## 2022-10-13 DIAGNOSIS — G89.29 CHRONIC BILATERAL LOW BACK PAIN WITHOUT SCIATICA: ICD-10-CM

## 2022-10-13 RX ORDER — OXYCODONE AND ACETAMINOPHEN 10; 325 MG/1; MG/1
1 TABLET ORAL EVERY 4 HOURS PRN
Qty: 180 TABLET | Refills: 0 | Status: SHIPPED | OUTPATIENT
Start: 2022-10-13 | End: 2022-11-08 | Stop reason: SDUPTHER

## 2022-11-16 ENCOUNTER — TELEPHONE (OUTPATIENT)
Dept: FAMILY MEDICINE | Facility: CLINIC | Age: 32
End: 2022-11-16
Payer: MEDICARE

## 2022-12-07 RX ORDER — ETONOGESTREL AND ETHINYL ESTRADIOL .12; .015 MG/D; MG/D
INSERT, EXTENDED RELEASE VAGINAL
Refills: 4 | OUTPATIENT
Start: 2022-12-07

## 2022-12-08 ENCOUNTER — PATIENT MESSAGE (OUTPATIENT)
Dept: OBSTETRICS AND GYNECOLOGY | Facility: CLINIC | Age: 32
End: 2022-12-08
Payer: MEDICARE

## 2022-12-08 DIAGNOSIS — M54.50 CHRONIC BILATERAL LOW BACK PAIN WITHOUT SCIATICA: ICD-10-CM

## 2022-12-08 DIAGNOSIS — G89.29 CHRONIC BILATERAL LOW BACK PAIN WITHOUT SCIATICA: ICD-10-CM

## 2022-12-08 RX ORDER — OXYCODONE AND ACETAMINOPHEN 10; 325 MG/1; MG/1
1 TABLET ORAL EVERY 4 HOURS PRN
Qty: 180 TABLET | Refills: 0 | Status: SHIPPED | OUTPATIENT
Start: 2022-12-12 | End: 2023-01-10 | Stop reason: SDUPTHER

## 2022-12-08 RX ORDER — ETONOGESTREL AND ETHINYL ESTRADIOL VAGINAL RING .015; .12 MG/D; MG/D
1 RING VAGINAL
Qty: 3 EACH | Refills: 1 | Status: SHIPPED | OUTPATIENT
Start: 2022-12-08 | End: 2022-12-09

## 2022-12-08 NOTE — TELEPHONE ENCOUNTER
----- Message from Ronna Perry sent at 12/8/2022  9:27 AM CST -----  Regarding: Refills  Contact: [phone]  Type:  RX Refill Request    Who Called: PT   Refill or New Rx: Refills   RX Name and Strength: etonogestreL-ethinyl estradioL (NUVARING) 0.12-0.015 mg/24 hr vaginal ring 3 each   How is the patient currently taking it? (ex. 1XDay): 1 x a month   Is this a 30 day or 90 day RX: 3  Preferred Pharmacy with phone number: [id number] [address] Phone:  [phone] Fax:  [phone]

## 2022-12-09 RX ORDER — ETONOGESTREL AND ETHINYL ESTRADIOL VAGINAL RING .015; .12 MG/D; MG/D
RING VAGINAL
Qty: 1 EACH | Refills: 2 | Status: SHIPPED | OUTPATIENT
Start: 2022-12-09 | End: 2023-03-20

## 2022-12-09 NOTE — TELEPHONE ENCOUNTER
Pt has annual set for February 13th, she is requesting refill of nuvaring to get her by until her appointment.

## 2023-01-10 DIAGNOSIS — G89.29 CHRONIC BILATERAL LOW BACK PAIN WITHOUT SCIATICA: ICD-10-CM

## 2023-01-10 DIAGNOSIS — M54.50 CHRONIC BILATERAL LOW BACK PAIN WITHOUT SCIATICA: ICD-10-CM

## 2023-01-10 RX ORDER — OXYCODONE AND ACETAMINOPHEN 10; 325 MG/1; MG/1
1 TABLET ORAL EVERY 4 HOURS PRN
Qty: 180 TABLET | Refills: 0 | Status: SHIPPED | OUTPATIENT
Start: 2023-01-13 | End: 2023-02-14 | Stop reason: SDUPTHER

## 2023-01-11 ENCOUNTER — PATIENT MESSAGE (OUTPATIENT)
Dept: PHYSICAL MEDICINE AND REHAB | Facility: CLINIC | Age: 33
End: 2023-01-11
Payer: MEDICARE

## 2023-01-11 DIAGNOSIS — G89.29 CHRONIC BILATERAL LOW BACK PAIN WITHOUT SCIATICA: ICD-10-CM

## 2023-01-11 DIAGNOSIS — M54.50 CHRONIC BILATERAL LOW BACK PAIN WITHOUT SCIATICA: ICD-10-CM

## 2023-01-11 RX ORDER — OXYCODONE AND ACETAMINOPHEN 10; 325 MG/1; MG/1
1 TABLET ORAL EVERY 4 HOURS PRN
Qty: 180 TABLET | Refills: 0 | OUTPATIENT
Start: 2023-01-13 | End: 2023-02-12

## 2023-01-25 ENCOUNTER — PATIENT MESSAGE (OUTPATIENT)
Dept: PHYSICAL MEDICINE AND REHAB | Facility: CLINIC | Age: 33
End: 2023-01-25
Payer: MEDICARE

## 2023-01-25 NOTE — TELEPHONE ENCOUNTER
I called the pt.  She indicated at on 01/13/2023 she picked up her prescription for oxycodone/APAP (1 p.o. q.4 hours p.r.n., #180).  However, the bottle was smaller and contained only 120 tablets.  She talked to the pharmacy at [facility] and they were supposed to initiate investigation but so far she has not heard from them.   I told her I have not seen her for about 2 years.  I will try to get her an appointment on 01/27/2023 to update her history and exam, and to get documentation about the apparent mistake in her pain medications.

## 2023-01-26 ENCOUNTER — PATIENT MESSAGE (OUTPATIENT)
Dept: PHYSICAL MEDICINE AND REHAB | Facility: CLINIC | Age: 33
End: 2023-01-26
Payer: MEDICARE

## 2023-02-13 ENCOUNTER — PATIENT MESSAGE (OUTPATIENT)
Dept: PHYSICAL MEDICINE AND REHAB | Facility: CLINIC | Age: 33
End: 2023-02-13
Payer: MEDICARE

## 2023-03-14 DIAGNOSIS — G89.29 CHRONIC BILATERAL LOW BACK PAIN WITHOUT SCIATICA: ICD-10-CM

## 2023-03-14 DIAGNOSIS — M54.50 CHRONIC BILATERAL LOW BACK PAIN WITHOUT SCIATICA: ICD-10-CM

## 2023-03-15 RX ORDER — OXYCODONE AND ACETAMINOPHEN 10; 325 MG/1; MG/1
1 TABLET ORAL EVERY 4 HOURS PRN
Qty: 180 TABLET | Refills: 0 | Status: SHIPPED | OUTPATIENT
Start: 2023-03-17 | End: 2023-04-17 | Stop reason: SDUPTHER

## 2023-04-13 DIAGNOSIS — G89.29 CHRONIC BILATERAL LOW BACK PAIN WITHOUT SCIATICA: ICD-10-CM

## 2023-04-13 DIAGNOSIS — M54.50 CHRONIC BILATERAL LOW BACK PAIN WITHOUT SCIATICA: ICD-10-CM

## 2023-04-13 RX ORDER — OXYCODONE AND ACETAMINOPHEN 10; 325 MG/1; MG/1
1 TABLET ORAL EVERY 4 HOURS PRN
Qty: 180 TABLET | Refills: 0 | Status: CANCELLED | OUTPATIENT
Start: 2023-04-13 | End: 2023-05-13

## 2023-04-14 DIAGNOSIS — M54.50 CHRONIC BILATERAL LOW BACK PAIN WITHOUT SCIATICA: ICD-10-CM

## 2023-04-14 DIAGNOSIS — G89.29 CHRONIC BILATERAL LOW BACK PAIN WITHOUT SCIATICA: ICD-10-CM

## 2023-04-14 RX ORDER — OXYCODONE AND ACETAMINOPHEN 10; 325 MG/1; MG/1
1 TABLET ORAL EVERY 4 HOURS PRN
Qty: 180 TABLET | Refills: 0 | Status: CANCELLED | OUTPATIENT
Start: 2023-04-14 | End: 2023-05-14

## 2023-04-15 ENCOUNTER — PATIENT MESSAGE (OUTPATIENT)
Dept: PHYSICAL MEDICINE AND REHAB | Facility: CLINIC | Age: 33
End: 2023-04-15
Payer: MEDICARE

## 2023-04-15 DIAGNOSIS — G89.29 CHRONIC BILATERAL LOW BACK PAIN WITHOUT SCIATICA: ICD-10-CM

## 2023-04-15 DIAGNOSIS — M54.50 CHRONIC BILATERAL LOW BACK PAIN WITHOUT SCIATICA: ICD-10-CM

## 2023-04-17 DIAGNOSIS — G89.29 CHRONIC BILATERAL LOW BACK PAIN WITHOUT SCIATICA: ICD-10-CM

## 2023-04-17 DIAGNOSIS — M54.50 CHRONIC BILATERAL LOW BACK PAIN WITHOUT SCIATICA: ICD-10-CM

## 2023-04-17 RX ORDER — OXYCODONE AND ACETAMINOPHEN 10; 325 MG/1; MG/1
1 TABLET ORAL EVERY 4 HOURS PRN
Qty: 180 TABLET | Refills: 0 | Status: SHIPPED | OUTPATIENT
Start: 2023-04-17 | End: 2023-05-16 | Stop reason: SDUPTHER

## 2023-04-17 RX ORDER — OXYCODONE AND ACETAMINOPHEN 10; 325 MG/1; MG/1
1 TABLET ORAL EVERY 4 HOURS PRN
Qty: 180 TABLET | Refills: 0 | Status: SHIPPED | OUTPATIENT
Start: 2023-04-17 | End: 2023-04-17 | Stop reason: SDUPTHER

## 2023-05-16 DIAGNOSIS — M54.50 CHRONIC BILATERAL LOW BACK PAIN WITHOUT SCIATICA: ICD-10-CM

## 2023-05-16 DIAGNOSIS — G89.29 CHRONIC BILATERAL LOW BACK PAIN WITHOUT SCIATICA: ICD-10-CM

## 2023-05-16 RX ORDER — OXYCODONE AND ACETAMINOPHEN 10; 325 MG/1; MG/1
1 TABLET ORAL EVERY 4 HOURS PRN
Qty: 180 TABLET | Refills: 0 | Status: SHIPPED | OUTPATIENT
Start: 2023-05-18 | End: 2023-06-13 | Stop reason: SDUPTHER

## 2023-05-31 ENCOUNTER — PATIENT MESSAGE (OUTPATIENT)
Dept: PHYSICAL MEDICINE AND REHAB | Facility: CLINIC | Age: 33
End: 2023-05-31
Payer: MEDICARE

## 2023-06-02 ENCOUNTER — OFFICE VISIT (OUTPATIENT)
Dept: PHYSICAL MEDICINE AND REHAB | Facility: CLINIC | Age: 33
End: 2023-06-02
Payer: MEDICARE

## 2023-06-02 DIAGNOSIS — Z79.891 CHRONICALLY ON OPIATE THERAPY: ICD-10-CM

## 2023-06-02 DIAGNOSIS — G89.29 CHRONIC BILATERAL LOW BACK PAIN WITH RIGHT-SIDED SCIATICA: Primary | ICD-10-CM

## 2023-06-02 DIAGNOSIS — M54.12 CERVICAL RADICULOPATHY: ICD-10-CM

## 2023-06-02 DIAGNOSIS — G89.29 CHRONIC NECK PAIN: ICD-10-CM

## 2023-06-02 DIAGNOSIS — M54.41 CHRONIC BILATERAL LOW BACK PAIN WITH RIGHT-SIDED SCIATICA: Primary | ICD-10-CM

## 2023-06-02 DIAGNOSIS — M43.17 SPONDYLOLISTHESIS OF LUMBOSACRAL REGION: ICD-10-CM

## 2023-06-02 DIAGNOSIS — M54.2 CHRONIC NECK PAIN: ICD-10-CM

## 2023-06-02 PROCEDURE — 99214 OFFICE O/P EST MOD 30 MIN: CPT | Mod: 95,,, | Performed by: PHYSICAL MEDICINE & REHABILITATION

## 2023-06-02 PROCEDURE — 99214 PR OFFICE/OUTPT VISIT, EST, LEVL IV, 30-39 MIN: ICD-10-PCS | Mod: 95,,, | Performed by: PHYSICAL MEDICINE & REHABILITATION

## 2023-06-02 RX ORDER — DULOXETIN HYDROCHLORIDE 30 MG/1
30 CAPSULE, DELAYED RELEASE ORAL DAILY
Qty: 30 CAPSULE | Refills: 1 | Status: SHIPPED | OUTPATIENT
Start: 2023-06-02 | End: 2023-07-10

## 2023-06-02 NOTE — PATIENT INSTRUCTIONS
Neck/Back Pain [General]    Both neck and back pain are usually caused by injury to the muscles or ligaments of the spine. Sometimes the disks that separate each bone of the spine may cause pain by putting pressure on a nearby nerve. Back and neck pain may appear after a sudden twisting/bending force (such as in a car accident), or sometimes after a simple awkward movement. In either case, muscle spasm is often present and adds to the pain.  Acute neck and back pain usually gets better in one to two weeks. Pain related to disk disease, arthritis in the spinal joints or spinal stenosis (narrowing of the spinal canal) can become chronic and last for months or years.  Home Care:  FOR NECK PAIN: Use a comfortable pillow that supports the head and keeps the spine in a neutral position. The position of the head should not be tilted forward or backward.  FOR BACK PAIN: You may need to stay in bed the first few days. But, as soon as possible, begin sitting or walking to avoid problems with prolonged bed rest (muscle weakness, worsening back stiffness and pain, blood clots in the legs).  When in bed, try to find a position of comfort. A firm mattress is best. Try lying flat on your back with pillows under your knees. You can also try lying on your side with your knees bent up towards your chest and a pillow between your knees.  Avoid prolonged sitting. This puts more stress on the lower back than standing or walking.  During the first two days after injury, apply an ICE PACK to the painful area for 20 minutes every 2-4 hours. This will reduce swelling and pain. HEAT (hot shower, hot bath or heating pad) works well for muscle spasm. You can start with ice, then switch to heat after two days. Some patients feel best alternating ice and heat treatments. Use the one method that feels the best to you.  You may use acetaminophen (Tylenol) or ibuprofen (Motrin, Advil) to control pain, unless another pain medicine was prescribed.  [NOTE: If you have chronic liver or kidney disease or ever had a stomach ulcer or GI bleeding, talk with your doctor before using these medicines.]  Be aware of safe lifting methods and do not lift anything over 15 pounds until all the pain is gone.  Follow Up  with your physician or this facility if your symptoms do not start to improve after one week. Physical therapy or further tests may be needed.  [NOTE: If X-rays were taken, they will be reviewed by a radiologist. You will be notified of any new findings that may affect your care.]  Get Prompt Medical Attention  if any of the following occur:  Pain becomes worse or spreads into your arms or legs  Weakness, numbness or pain in one or both arms or legs  Loss of bowel or bladder control  Numbness in the groin area  Difficulty walking  Fever of 100.4ºF (38ºC) or higher, or as directed by your healthcare provider  © 9373-1929 [place] [address]. All rights reserved. This information is not intended as a substitute for professional medical care. Always follow your healthcare professional's instructions.    Neck Exercises: Passive Neck Rotation          To start, lie on your back, knees bent and feet flat on the floor. Keep your ears, shoulders, and hips aligned, but dont press your lower back to the floor. Rest your hands on your pelvis. Breathe deeply and relax.  With your neck relaxed, place the palm of one hand on your forehead. Use your hand to turn your head to one side until you feel a stretch in the neck muscles. Do not push through pain.  Hold for 5 seconds. Then turn to the other side.  Repeat 5 times on each side.   Note: Keep your shoulders on the floor. Dont lift your chin as you turn your head.  © 2000-2013 [place] [address]. All rights reserved. This information is not intended as a substitute for professional medical care. Always follow your healthcare professional's  instructions.    Exercises: Neck Isometrics  To start, sit in a chair with your feet flat on the floor. Your weight should be slightly forward so that youre balanced evenly on your buttocks. Relax your shoulders and keep your head level. Using a chair with arms may help you keep your balance.  Press your palm against your forehead. Resist with your neck muscles. Hold for 10 seconds. Relax. Repeat 5 times.  Do the exercise again, pressing on the side of your head. Repeat 5 times. Switch sides.  Do the exercise again, pressing on the back of your head. Repeat 5 times.          For your safety, check with your healthcare provider before starting an exercise program.    © 0505-7657 [facility], [address] [place] [address]. All rights reserved. This information is not intended as a substitute for professional medical care. Always follow your healthcare professional's instructions.    Shoulder and Upper Back Stretch  To start, stand tall with your ears, shoulders, and hips in line. Your feet should be slightly apart, positioned just under your hips. Focus your eyes directly in front of you.  this position for a few seconds before starting your exercise. This helps increase your awareness of proper posture.  Reach overhead and slightly back with both arms. Keep your shoulders and neck aligned and your elbows behind your shoulders.  With your palms facing the ceiling, turn your fingers inward.  Take a deep breath. Breathe out and lower your elbows toward your buttocks. Hold for 5 seconds, then return to starting position.  Repeat 3 times.          © 3189-7843 [facility], [address] [place] [address]. All rights reserved. This information is not intended as a substitute for professional medical care. Always follow your healthcare professional's instructions.

## 2023-06-02 NOTE — PROGRESS NOTES
Subjective:       Patient ID: Kendra Russell is a 32 y.o. female.    A telemedicine Audio Visit is being done today.     The patient location is: home  The chief complaint leading to consultation is: back pain.  Visit type: Virtual visit with synchronous audio  Total time spent with patient: 25 min  Each patient to whom he or she provides medical services by telemedicine is:  (1) informed of the relationship between the physician and patient and the respective role of any other health care provider with respect to management of the patient; and (2) notified that he or she may decline to receive medical services by telemedicine and may withdraw from such care at any time.  This service was not originating from a related E/M service provided within the previous 7 days nor will  to an E/M service or procedure within the next 24 hours or my soonest available appointment.  Prevailing standard of care was able to be met in this audio-only visit.    Chief Complaint: No chief complaint on file.    HPI     HISTORY OF PRESENT ILLNESS:  Mrs. Russell is a 32-year-old white female who is followed up at the [facility] for chronic low back pain.  Her past medical history is significant for interstitial cystitis, status post bladder stimulator in 2010 and subsequent removal, colon surgery in 2012 for bowel obstruction, TMJ dysfunction, trigeminal neuralgia, COVID-19 illness hospitalized in 4/2020.   Her last visit to the clinic was on 2/8/2021 (a telemedicine audio visit due to COVID-19).  She was maintained on pregabalin and p.r.n. Oxycodone/APAP.    The patient was lost to follow-up.  She is being seen today to follow-up on her pain management.  She reports that she had few admissions over the past months for sepsis.    Her back pain has been worse.  It is a constant deep aching pain in the low lumbar spine and across her back.  She has shooting pain with numbness and throbbing to the right foot.  Her pain is  aggravated with prolonged standing, bending and lifting.  It is better with rest and her pain medications.  Her maximum pain is 8-9/10 and minimum 2-3/10.  Today it is 5/10.  She denies lower extremity weakness.  She denies any bowel incontinence. She has an indwelling murphy catheter.    She started complaining about 3 weeks ago of neck pain.  She denies any apparent preceding injuries.  She had shooting pain to her left arm.  Her pain started constant but currently is intermittent.  Her pain is an aching throbbing sensation in the whole cervical spine.  The pain radiates to the left hand/fingers with numbness.  Pain is aggravated by neck movement.  It is better with gentle range of motion, heat or ice.  Her maximum pain is 9-10/10 and minimum 2/10.  Today it is 2/10.  Patient complains of mild weakness of her left hand .  She denies impaired hand coordination or gait.    She is currently on:  oxycodone/APAP 10/325 p.r.n. 6 times per day.    She was on Lyrica but stopped due to nausea and sleepiness.  She was previously on gabapentin but reports having severe reaction including shortness of breath and dizziness.    Past Medical History:   Diagnosis Date    FHx: bowel obstruction     HA (headache)     Interstitial cystitis     Ovarian cyst     Ulcer      Review of patient's allergies indicates:   Allergen Reactions    6-iodomethylnorcholesterol i-131 Other (See Comments)    Adhesive tape-silicones     Ciprofloxacin hcl     Escitalopram     Flagyl [metronidazole hcl]     Hydrocodone-acetaminophen      Bad migraines; dizzy, nausea    Ketamine     Metoclopramide      Other reaction(s): Rash, Unspecified    Peanut     Pylera [bismuth subcit t-vqracxcfo-apy] Nausea And Vomiting and Other (See Comments)     Pt reports dizziness with Pylera    Scopolamine      Other reaction(s): Visual alteration (finding)    Toradol [ketorolac]     Zofran [ondansetron hcl (pf)] Other (See Comments)     Pt reports migraine with Zofran     Adhesive Rash     Plastic tape    Avelox [moxifloxacin] Nausea And Vomiting    Compazine [prochlorperazine] Rash    Demerol [meperidine] Nausea And Vomiting    Neurontin [gabapentin] Nausea And Vomiting    Ultram [tramadol] Nausea And Vomiting         Review of Systems   Constitutional:  Positive for chills, fatigue and fever.   HENT:  Negative for trouble swallowing.    Eyes:  Positive for visual disturbance.   Respiratory:  Positive for chest tightness and shortness of breath.    Cardiovascular:  Negative for chest pain.   Gastrointestinal:  Positive for nausea and vomiting. Negative for constipation.   Genitourinary:  Positive for difficulty urinating.   Musculoskeletal:  Positive for back pain and neck pain. Negative for arthralgias, gait problem, joint swelling and neck stiffness.   Neurological:  Positive for dizziness and headaches. Negative for syncope.   Psychiatric/Behavioral:  Negative for behavioral problems.      Objective:      Physical Exam  Constitutional:       Appearance: Normal appearance.   Musculoskeletal:      Comments: N/A due to a telemedicine visit.    BUE:  The patient reports mild subjective weakness.    BLE:  The patient reports  my subjective weakness.     Psychiatric:         Mood and Affect: Mood normal.         Behavior: Behavior normal.             Assessment:       1. Chronic bilateral low back pain with right-sided sciatica    2. Spondylolisthesis of lumbosacral region    3. Chronically on opiate therapy        Plan:       - X-Ray Lumbar Spine Ap Lateral w/Flex Ext; Future  -  X-Ray Cervical Spine AP Lat with Flex Ex; Future  - Start DULoxetine (CYMBALTA) 30 MG capsule; Take 1 capsule (30 mg total) by mouth once daily. In 1-2 weeks, if no relief, may increase dose to 2 capsules once daily. Call for refills.  - Continue oxyCODONE-acetaminophen (PERCOCET)  mg per tablet; Take 1 tablet by mouth every 4-6 hours as needed for Pain.    - Regular home exercise program was  encouraged.  - Follow up in about 4 months (around 10/2/2023).     This note was partly generated with [employer] voice recognition software. I apologize for any possible typographical errors.

## 2023-06-13 DIAGNOSIS — M54.50 CHRONIC BILATERAL LOW BACK PAIN WITHOUT SCIATICA: ICD-10-CM

## 2023-06-13 DIAGNOSIS — G89.29 CHRONIC BILATERAL LOW BACK PAIN WITHOUT SCIATICA: ICD-10-CM

## 2023-06-13 RX ORDER — OXYCODONE AND ACETAMINOPHEN 10; 325 MG/1; MG/1
1 TABLET ORAL EVERY 4 HOURS PRN
Qty: 180 TABLET | Refills: 0 | Status: SHIPPED | OUTPATIENT
Start: 2023-06-18 | End: 2023-07-21 | Stop reason: SDUPTHER

## 2023-06-14 ENCOUNTER — PATIENT MESSAGE (OUTPATIENT)
Dept: OBSTETRICS AND GYNECOLOGY | Facility: CLINIC | Age: 33
End: 2023-06-14
Payer: MEDICARE

## 2023-06-14 RX ORDER — ETONOGESTREL AND ETHINYL ESTRADIOL VAGINAL RING .015; .12 MG/D; MG/D
1 RING VAGINAL
Qty: 3 EACH | Refills: 2 | Status: SHIPPED | OUTPATIENT
Start: 2023-06-14

## 2023-06-14 RX ORDER — ETONOGESTREL AND ETHINYL ESTRADIOL VAGINAL RING .015; .12 MG/D; MG/D
1 RING VAGINAL
Qty: 2 EACH | Refills: 0 | Status: SHIPPED | OUTPATIENT
Start: 2023-06-14 | End: 2023-06-14

## 2023-06-14 NOTE — TELEPHONE ENCOUNTER
Pt hasn't been seen since  2021. Rx sent for 2 months this morning but I can add additional refills.    Rachelle Moreno MD, FACOG  OB/GYN

## 2023-06-27 ENCOUNTER — PATIENT MESSAGE (OUTPATIENT)
Dept: OBSTETRICS AND GYNECOLOGY | Facility: CLINIC | Age: 33
End: 2023-06-27
Payer: MEDICARE

## 2023-06-29 DIAGNOSIS — R30.0 DYSURIA: Primary | ICD-10-CM

## 2023-06-29 RX ORDER — SULFAMETHOXAZOLE AND TRIMETHOPRIM 800; 160 MG/1; MG/1
1 TABLET ORAL 2 TIMES DAILY
Qty: 14 TABLET | Refills: 0 | Status: SHIPPED | OUTPATIENT
Start: 2023-06-29 | End: 2023-06-30

## 2023-06-30 ENCOUNTER — TELEPHONE (OUTPATIENT)
Dept: OBSTETRICS AND GYNECOLOGY | Facility: CLINIC | Age: 33
End: 2023-06-30
Payer: MEDICARE

## 2023-06-30 RX ORDER — NITROFURANTOIN 25; 75 MG/1; MG/1
100 CAPSULE ORAL 2 TIMES DAILY
Qty: 14 CAPSULE | Refills: 0 | Status: SHIPPED | OUTPATIENT
Start: 2023-06-30 | End: 2023-07-07

## 2023-06-30 RX ORDER — NITROFURANTOIN 25; 75 MG/1; MG/1
100 CAPSULE ORAL 2 TIMES DAILY
Qty: 14 CAPSULE | Refills: 0 | Status: SHIPPED | OUTPATIENT
Start: 2023-06-30 | End: 2023-06-30 | Stop reason: SDUPTHER

## 2023-06-30 NOTE — TELEPHONE ENCOUNTER
----- Message from Farrah Kaplan sent at 6/30/2023 11:20 AM CDT -----  .Type:  Needs Medical Advice    Who Called: pt    Would the patient rather a call back or a response via [facility]? Call back  Best Call Back Number: [phone]  Additional Information:     Pt stated the wrong antibiotic was called in and the one that was called in pt is highly allergic and medication was also sent to the wrong pharmacy, the correct pharmacy is [facility] in [facility] ph# [phone]

## 2023-06-30 NOTE — PROGRESS NOTES
Called patient. Desires macrobid. Discussed that if she is having fever she may need something stronger than macrobid to prevent worsening of symptoms. She desires macrobid only at this time and will wait to see what urine culture shows.     Rachelle Moreno MD, FACOG  OB/GYN

## 2023-06-30 NOTE — TELEPHONE ENCOUNTER
Spoke to pt and she informs us that you sent in bactrim ds which she is highly allergic to and would like  macriobid to be sent in to jay on file. Please advise

## 2023-07-03 ENCOUNTER — TELEPHONE (OUTPATIENT)
Dept: OBSTETRICS AND GYNECOLOGY | Facility: CLINIC | Age: 33
End: 2023-07-03
Payer: MEDICARE

## 2023-07-03 NOTE — TELEPHONE ENCOUNTER
----- Message from Mehdi Grover sent at 6/29/2023 10:51 AM CDT -----  Contact: pt  Type: Requesting to speak with nurse        Who Called: PT  Regarding: having UTI symptoms    Would the patient rather a call back or a response via [facility]? Call back  Best Call Back Number: [phone]  Additional Information:

## 2023-07-07 NOTE — TELEPHONE ENCOUNTER
I spoke with patient on 6/30. See other telephone encounter. Pt did not have bactrim listed as an allergy at the time the rx was sent. It is now added. Pt asked for a rx for macrobid which was sent on 6/3023 as well    nitrofurantoin, macrocrystal-monohydrate, (MACROBID) 100 MG capsule 14 capsule 0 6/30/2023 7/7/2023 No   Sig - Route: Take 1 capsule (100 mg total) by mouth 2 (two) times daily. for 7 days - Oral   Sent to pharmacy as: nitrofurantoin, macrocrystal-monohydrate, (MACROBID) 100 MG capsule   Class: Normal   Order: [id number]   Date/Time Signed: 6/30/2023 15:49       E-Prescribing Status: Receipt confirmed by pharmacy (6/30/2023  3:49 PM CDT)       Please call patient and inform of this and make sure she received the macrobid. This message is over 1 week old and there are multiple encounters which can be very confusing     Rachelle Moreno MD, FACOG  OB/GYN

## 2023-07-08 DIAGNOSIS — G89.29 CHRONIC NECK PAIN: ICD-10-CM

## 2023-07-08 DIAGNOSIS — M54.41 CHRONIC BILATERAL LOW BACK PAIN WITH RIGHT-SIDED SCIATICA: ICD-10-CM

## 2023-07-08 DIAGNOSIS — G89.29 CHRONIC BILATERAL LOW BACK PAIN WITH RIGHT-SIDED SCIATICA: ICD-10-CM

## 2023-07-08 DIAGNOSIS — M54.2 CHRONIC NECK PAIN: ICD-10-CM

## 2023-07-10 RX ORDER — DULOXETIN HYDROCHLORIDE 30 MG/1
CAPSULE, DELAYED RELEASE ORAL
Qty: 30 CAPSULE | Refills: 1 | Status: SHIPPED | OUTPATIENT
Start: 2023-07-10

## 2023-07-11 ENCOUNTER — PATIENT MESSAGE (OUTPATIENT)
Dept: OBSTETRICS AND GYNECOLOGY | Facility: CLINIC | Age: 33
End: 2023-07-11
Payer: MEDICARE

## 2023-07-15 DIAGNOSIS — G89.29 CHRONIC BILATERAL LOW BACK PAIN WITHOUT SCIATICA: ICD-10-CM

## 2023-07-15 DIAGNOSIS — M54.50 CHRONIC BILATERAL LOW BACK PAIN WITHOUT SCIATICA: ICD-10-CM

## 2023-07-15 RX ORDER — OXYCODONE AND ACETAMINOPHEN 10; 325 MG/1; MG/1
1 TABLET ORAL EVERY 4 HOURS PRN
Qty: 180 TABLET | Refills: 0 | Status: CANCELLED | OUTPATIENT
Start: 2023-07-15 | End: 2023-08-14

## 2023-07-18 DIAGNOSIS — G89.29 CHRONIC BILATERAL LOW BACK PAIN WITHOUT SCIATICA: ICD-10-CM

## 2023-07-18 DIAGNOSIS — M54.50 CHRONIC BILATERAL LOW BACK PAIN WITHOUT SCIATICA: ICD-10-CM

## 2023-07-18 RX ORDER — OXYCODONE AND ACETAMINOPHEN 10; 325 MG/1; MG/1
1 TABLET ORAL EVERY 4 HOURS PRN
Qty: 180 TABLET | Refills: 0 | Status: CANCELLED | OUTPATIENT
Start: 2023-07-18 | End: 2023-08-17

## 2023-07-19 ENCOUNTER — PATIENT MESSAGE (OUTPATIENT)
Dept: PHYSICAL MEDICINE AND REHAB | Facility: CLINIC | Age: 33
End: 2023-07-19
Payer: MEDICARE

## 2023-07-21 ENCOUNTER — PATIENT MESSAGE (OUTPATIENT)
Dept: PHYSICAL MEDICINE AND REHAB | Facility: CLINIC | Age: 33
End: 2023-07-21
Payer: MEDICARE

## 2023-07-21 DIAGNOSIS — G89.29 CHRONIC BILATERAL LOW BACK PAIN WITHOUT SCIATICA: ICD-10-CM

## 2023-07-21 DIAGNOSIS — M54.50 CHRONIC BILATERAL LOW BACK PAIN WITHOUT SCIATICA: ICD-10-CM

## 2023-07-21 RX ORDER — OXYCODONE AND ACETAMINOPHEN 10; 325 MG/1; MG/1
1 TABLET ORAL EVERY 4 HOURS PRN
Qty: 180 TABLET | Refills: 0 | Status: CANCELLED | OUTPATIENT
Start: 2023-07-21 | End: 2023-08-20

## 2023-07-21 RX ORDER — OXYCODONE AND ACETAMINOPHEN 10; 325 MG/1; MG/1
1 TABLET ORAL EVERY 4 HOURS PRN
Qty: 180 TABLET | Refills: 0 | Status: CANCELLED | OUTPATIENT
Start: 2023-07-18 | End: 2023-08-17

## 2023-07-22 ENCOUNTER — PATIENT MESSAGE (OUTPATIENT)
Dept: PHYSICAL MEDICINE AND REHAB | Facility: CLINIC | Age: 33
End: 2023-07-22
Payer: MEDICARE

## 2023-07-24 ENCOUNTER — TELEPHONE (OUTPATIENT)
Dept: PHYSICAL MEDICINE AND REHAB | Facility: CLINIC | Age: 33
End: 2023-07-24
Payer: MEDICARE

## 2023-07-24 ENCOUNTER — PATIENT MESSAGE (OUTPATIENT)
Dept: PHYSICAL MEDICINE AND REHAB | Facility: CLINIC | Age: 33
End: 2023-07-24
Payer: MEDICARE

## 2023-07-24 DIAGNOSIS — M54.50 CHRONIC BILATERAL LOW BACK PAIN WITHOUT SCIATICA: ICD-10-CM

## 2023-07-24 DIAGNOSIS — G89.29 CHRONIC BILATERAL LOW BACK PAIN WITHOUT SCIATICA: ICD-10-CM

## 2023-07-24 RX ORDER — OXYCODONE AND ACETAMINOPHEN 10; 325 MG/1; MG/1
1 TABLET ORAL EVERY 4 HOURS PRN
Qty: 180 TABLET | Refills: 0 | Status: SHIPPED | OUTPATIENT
Start: 2023-07-24 | End: 2023-08-20 | Stop reason: SDUPTHER

## 2023-07-24 RX ORDER — OXYCODONE AND ACETAMINOPHEN 10; 325 MG/1; MG/1
1 TABLET ORAL EVERY 4 HOURS PRN
Qty: 180 TABLET | Refills: 0 | Status: SHIPPED | OUTPATIENT
Start: 2023-07-24 | End: 2023-07-24 | Stop reason: SDUPTHER

## 2023-07-24 NOTE — TELEPHONE ENCOUNTER
----- Message from Shantell Vargas sent at 7/24/2023  8:38 AM CDT -----  Regarding: REFILL  Contact: [phone]  Calling in reference to getting refill for pain medication and is overdue a week. Please call patient to discuss or have doctor on call send prescription to pharmacy. Patient stated that nurse sounded like she was in a panic because she had been putting patients on the phone with the  all last week. Please have  call patient.

## 2023-08-20 DIAGNOSIS — M54.50 CHRONIC BILATERAL LOW BACK PAIN WITHOUT SCIATICA: ICD-10-CM

## 2023-08-20 DIAGNOSIS — G89.29 CHRONIC BILATERAL LOW BACK PAIN WITHOUT SCIATICA: ICD-10-CM

## 2023-08-21 RX ORDER — OXYCODONE AND ACETAMINOPHEN 10; 325 MG/1; MG/1
1 TABLET ORAL EVERY 4 HOURS PRN
Qty: 180 TABLET | Refills: 0 | Status: SHIPPED | OUTPATIENT
Start: 2023-08-23 | End: 2023-09-18 | Stop reason: SDUPTHER

## 2023-09-13 ENCOUNTER — TELEPHONE (OUTPATIENT)
Dept: OBSTETRICS AND GYNECOLOGY | Facility: CLINIC | Age: 33
End: 2023-09-13
Payer: MEDICARE

## 2023-09-18 DIAGNOSIS — G89.29 CHRONIC BILATERAL LOW BACK PAIN WITHOUT SCIATICA: ICD-10-CM

## 2023-09-18 DIAGNOSIS — M54.50 CHRONIC BILATERAL LOW BACK PAIN WITHOUT SCIATICA: ICD-10-CM

## 2023-09-19 RX ORDER — OXYCODONE AND ACETAMINOPHEN 10; 325 MG/1; MG/1
1 TABLET ORAL EVERY 4 HOURS PRN
Qty: 180 TABLET | Refills: 0 | Status: SHIPPED | OUTPATIENT
Start: 2023-09-22 | End: 2023-10-18 | Stop reason: SDUPTHER

## 2023-10-18 DIAGNOSIS — M54.50 CHRONIC BILATERAL LOW BACK PAIN WITHOUT SCIATICA: ICD-10-CM

## 2023-10-18 DIAGNOSIS — G89.29 CHRONIC BILATERAL LOW BACK PAIN WITHOUT SCIATICA: ICD-10-CM

## 2023-10-20 ENCOUNTER — PATIENT MESSAGE (OUTPATIENT)
Dept: PHYSICAL MEDICINE AND REHAB | Facility: CLINIC | Age: 33
End: 2023-10-20
Payer: MEDICARE

## 2023-10-20 RX ORDER — OXYCODONE AND ACETAMINOPHEN 10; 325 MG/1; MG/1
1 TABLET ORAL EVERY 4 HOURS PRN
Qty: 180 TABLET | Refills: 0 | Status: SHIPPED | OUTPATIENT
Start: 2023-10-22 | End: 2023-11-16 | Stop reason: SDUPTHER

## 2023-11-16 DIAGNOSIS — G89.29 CHRONIC BILATERAL LOW BACK PAIN WITHOUT SCIATICA: ICD-10-CM

## 2023-11-16 DIAGNOSIS — M54.50 CHRONIC BILATERAL LOW BACK PAIN WITHOUT SCIATICA: ICD-10-CM

## 2023-11-17 RX ORDER — OXYCODONE AND ACETAMINOPHEN 10; 325 MG/1; MG/1
1 TABLET ORAL EVERY 4 HOURS PRN
Qty: 180 TABLET | Refills: 0 | Status: SHIPPED | OUTPATIENT
Start: 2023-11-21 | End: 2023-12-17 | Stop reason: SDUPTHER

## 2023-12-08 ENCOUNTER — TELEPHONE (OUTPATIENT)
Dept: HEMATOLOGY/ONCOLOGY | Facility: CLINIC | Age: 33
End: 2023-12-08
Payer: MEDICARE

## 2023-12-08 NOTE — TELEPHONE ENCOUNTER
Spoke with Martha Bansal, confirmed that referral is for anemia & + ERIKA. Forwarded to benign hematology navigation to schedule.

## 2023-12-08 NOTE — TELEPHONE ENCOUNTER
----- Message from Colni Briseno sent at 12/7/2023 11:36 AM CST -----  Renee Kumar,    The patient is referred to Hematology Oncology by Dr. Figueroa with [facility]. They completed a biopsy of the patient's left knee and found superficial perivascular dermatitis.  Not sure if you would handle this or if goes to Dermatology? The referral/records are scanned into media manager.    Thank you,    Colin Briseno  Oncology Edi

## 2023-12-17 DIAGNOSIS — G89.29 CHRONIC BILATERAL LOW BACK PAIN WITHOUT SCIATICA: ICD-10-CM

## 2023-12-17 DIAGNOSIS — M54.50 CHRONIC BILATERAL LOW BACK PAIN WITHOUT SCIATICA: ICD-10-CM

## 2023-12-18 RX ORDER — OXYCODONE AND ACETAMINOPHEN 10; 325 MG/1; MG/1
1 TABLET ORAL EVERY 4 HOURS PRN
Qty: 180 TABLET | Refills: 0 | Status: SHIPPED | OUTPATIENT
Start: 2023-12-21 | End: 2024-01-16 | Stop reason: SDUPTHER

## 2024-01-05 ENCOUNTER — TELEPHONE (OUTPATIENT)
Dept: HEMATOLOGY/ONCOLOGY | Facility: CLINIC | Age: 34
End: 2024-01-05
Payer: MEDICARE

## 2024-01-05 NOTE — TELEPHONE ENCOUNTER
----- Message from Jason Osborn sent at 1/5/2024  9:37 AM CST -----  Type:  Sooner Apoointment Request    Caller is requesting a sooner appointment. Caller will  accept being placed on the waitlist and is requesting a message be sent to doctor.  Name of Caller:pt  When is the first available appointment?02/2024  Symptoms:anemia and +ERIKA  Would the patient rather a call back or a response via [facility]? call  Best Call Back Number:[phone]  Additional Information:   Pt would like to know if she can be schedule as a - VIRTUAL sooner

## 2024-01-16 DIAGNOSIS — G89.29 CHRONIC BILATERAL LOW BACK PAIN WITHOUT SCIATICA: ICD-10-CM

## 2024-01-16 DIAGNOSIS — M54.50 CHRONIC BILATERAL LOW BACK PAIN WITHOUT SCIATICA: ICD-10-CM

## 2024-01-16 RX ORDER — OXYCODONE AND ACETAMINOPHEN 10; 325 MG/1; MG/1
1 TABLET ORAL EVERY 4 HOURS PRN
Qty: 180 TABLET | Refills: 0 | Status: CANCELLED | OUTPATIENT
Start: 2024-01-16 | End: 2024-02-15

## 2024-01-17 RX ORDER — OXYCODONE AND ACETAMINOPHEN 10; 325 MG/1; MG/1
1 TABLET ORAL EVERY 4 HOURS PRN
Qty: 180 TABLET | Refills: 0 | Status: SHIPPED | OUTPATIENT
Start: 2024-01-20 | End: 2024-02-14 | Stop reason: SDUPTHER

## 2024-02-03 ENCOUNTER — ON-DEMAND VIRTUAL (OUTPATIENT)
Dept: URGENT CARE | Facility: CLINIC | Age: 34
End: 2024-02-03
Payer: MEDICARE

## 2024-02-03 DIAGNOSIS — J06.9 UPPER RESPIRATORY TRACT INFECTION, UNSPECIFIED TYPE: Primary | ICD-10-CM

## 2024-02-03 PROCEDURE — 99212 OFFICE O/P EST SF 10 MIN: CPT | Mod: 95,,, | Performed by: FAMILY MEDICINE

## 2024-02-03 RX ORDER — AMOXICILLIN 500 MG/1
500 TABLET, FILM COATED ORAL EVERY 12 HOURS
Qty: 10 TABLET | Refills: 0 | Status: SHIPPED | OUTPATIENT
Start: 2024-02-03 | End: 2024-02-08

## 2024-02-03 RX ORDER — FLUCONAZOLE 150 MG/1
150 TABLET ORAL DAILY
Qty: 1 TABLET | Refills: 0 | Status: SHIPPED | OUTPATIENT
Start: 2024-02-03 | End: 2024-02-04

## 2024-02-03 NOTE — PROGRESS NOTES
Subjective:      Patient ID: Kendra Russell is a 33 y.o. female.    Vitals:  vitals were not taken for this visit.     Chief Complaint: Sinus Problem (Sinus symptoms)      Visit Type: TELE AUDIOVISUAL    Present with the patient at the time of consultation: TELEMED PRESENT WITH PATIENT: None    Past Medical History:   Diagnosis Date    FHx: bowel obstruction     HA (headache)     Interstitial cystitis     Ovarian cyst     Ulcer      Past Surgical History:   Procedure Laterality Date    BACK SURGERY  2010    had the scar from this surgery fixed recently    CHOLECYSTECTOMY N/A 05/2015    COLON SURGERY  2012    bowel obstruction    MINI-LAPAROTOMY      NOSE SURGERY      RHINOPLASTY TIP      tracheal polyp removed      UPPER GASTROINTESTINAL ENDOSCOPY  09/2017     Review of patient's allergies indicates:   Allergen Reactions    Bismuth subcit [id number] Nausea And Vomiting, Other (See Comments) and Shortness Of Breath     Pt reports dizziness with Pylera    Other Reaction(s): Not available, Other    Pt reports dizziness with Pylera   Pt reports dizziness with Pylera    Ciprofloxacin Rash     Other Reaction(s): Other (See Comments)    Muscle and joint pain (severe).  Very painful x 1 month    Nitrofurantoin monohyd/m-cryst Nausea And Vomiting and Swelling    Sulfamethoxazole-trimethoprim Rash     Fever, joint muscle pain-was hospilized    Other Reaction(s): Other (See Comments)    muscle and joint pains for few weeks    6-iodomethylnorcholesterol i-131 Other (See Comments)    Adhesive tape-silicones     Ciprofloxacin hcl     Escitalopram     Flagyl [metronidazole hcl]     Hydrocodone-acetaminophen      Bad migraines; dizzy, nausea    Ketamine     Metoclopramide      Other reaction(s): Rash, Unspecified    Peanut     Scopolamine      Other reaction(s): Visual alteration (finding)    Toradol [ketorolac]     Zofran [ondansetron hcl (pf)] Other (See Comments)     Pt reports migraine with Zofran    Adhesive Rash      Plastic tape    Compazine [prochlorperazine] Rash    Fosfomycin tromethamine Rash    Gabapentin Nausea And Vomiting     Other Reaction(s): Not available    Meperidine Nausea And Vomiting     Other Reaction(s): Not available    Moxifloxacin Nausea And Vomiting     Other Reaction(s): Not available    Tramadol Nausea And Vomiting     Other Reaction(s): Not available     Current Outpatient Medications on File Prior to Visit   Medication Sig Dispense Refill    ALPRAZolam (XANAX) 0.5 MG tablet Take 1 tablet (0.5 mg total) by mouth 3 (three) times daily as needed for Anxiety (anxiety). 45 tablet 0    cyanocobalamin, vitamin B-12, (VITAMIN B-12) 5,000 mcg/mL Drop Take by mouth once daily.       DULoxetine (CYMBALTA) 30 MG capsule TAKE ONE CAPSULE BY MOUTH DAILY. IN 1-2 WEEKS, IF NO RELIEF, MAY INCREASE DOSE TO TWO capsules DAILY 30 capsule 1    etonogestreL-ethinyl estradioL (NUVARING) 0.12-0.015 mg/24 hr vaginal ring Place 1 each vaginally every 28 days. 3 each 2    oxyCODONE-acetaminophen (PERCOCET)  mg per tablet Take 1 tablet by mouth every 4 (four) hours as needed for Pain. 180 tablet 0    pregabalin (LYRICA) 50 MG capsule TAKE ONE CAPSULE BY MOUTH THREE TIMES DAILY 90 capsule 2    promethazine (PHENERGAN) 25 MG tablet Take 1 tablet (25 mg total) by mouth every 6 (six) hours as needed for Nausea. 20 tablet 0     No current facility-administered medications on file prior to visit.     Family History   Problem Relation Age of Onset    Diabetes Mother     Stroke Mother         51 yo    Diabetes Father     Hypertension Father     GI problems Father     Hyperlipidemia Father     Diabetes Maternal Aunt     Diabetes Maternal Uncle     Diabetes Maternal Grandmother     Diabetes Maternal Grandfather     Cancer Paternal Grandmother         bladder    Bladder Cancer Paternal Grandmother     Colon cancer Paternal Grandfather     Breast cancer Neg Hx     Heart disease Neg Hx     Ovarian cancer Neg Hx     Cervical cancer Neg  Hx     Vaginal cancer Neg Hx     Endometrial cancer Neg Hx            Ohs Peq Odvv Intake    2/3/2024  3:08 AM CST - Filed by Patient   What is your current physical address in the event of a medical emergency? [address] [place] [address]   Are you able to take your vital signs? No   Please attach any relevant images or files          34 yo female with 2 weeks of symptoms  Congestion, fever, pain in the face, burning in the sinuses  Denies cough     Sinus Problem  Associated symptoms include congestion.       Constitution: Positive for fever.   HENT:  Positive for congestion.         Objective:   The physical exam was conducted virtually.  Physical Exam   Constitutional: No distress.   Pulmonary/Chest: Effort normal. No respiratory distress.   Neurological: She is alert.       Assessment:     1. Upper respiratory tract infection, unspecified type        Plan:       Upper respiratory tract infection, unspecified type      Please take your medicine with food.

## 2024-02-14 DIAGNOSIS — M54.50 CHRONIC BILATERAL LOW BACK PAIN WITHOUT SCIATICA: ICD-10-CM

## 2024-02-14 DIAGNOSIS — G89.29 CHRONIC BILATERAL LOW BACK PAIN WITHOUT SCIATICA: ICD-10-CM

## 2024-02-14 RX ORDER — OXYCODONE AND ACETAMINOPHEN 10; 325 MG/1; MG/1
1 TABLET ORAL EVERY 4 HOURS PRN
Qty: 180 TABLET | Refills: 0 | Status: SHIPPED | OUTPATIENT
Start: 2024-02-19 | End: 2024-03-15 | Stop reason: SDUPTHER

## 2024-03-15 DIAGNOSIS — G89.29 CHRONIC BILATERAL LOW BACK PAIN WITHOUT SCIATICA: ICD-10-CM

## 2024-03-15 DIAGNOSIS — M54.50 CHRONIC BILATERAL LOW BACK PAIN WITHOUT SCIATICA: ICD-10-CM

## 2024-03-15 RX ORDER — OXYCODONE AND ACETAMINOPHEN 10; 325 MG/1; MG/1
1 TABLET ORAL EVERY 4 HOURS PRN
Qty: 180 TABLET | Refills: 0 | Status: CANCELLED | OUTPATIENT
Start: 2024-03-15 | End: 2024-04-14

## 2024-03-16 RX ORDER — OXYCODONE AND ACETAMINOPHEN 10; 325 MG/1; MG/1
1 TABLET ORAL EVERY 4 HOURS PRN
Qty: 180 TABLET | Refills: 0 | Status: SHIPPED | OUTPATIENT
Start: 2024-03-20 | End: 2024-04-14 | Stop reason: SDUPTHER

## 2024-04-14 DIAGNOSIS — G89.29 CHRONIC BILATERAL LOW BACK PAIN WITHOUT SCIATICA: ICD-10-CM

## 2024-04-14 DIAGNOSIS — M54.50 CHRONIC BILATERAL LOW BACK PAIN WITHOUT SCIATICA: ICD-10-CM

## 2024-04-14 RX ORDER — OXYCODONE AND ACETAMINOPHEN 10; 325 MG/1; MG/1
1 TABLET ORAL EVERY 4 HOURS PRN
Qty: 180 TABLET | Refills: 0 | Status: CANCELLED | OUTPATIENT
Start: 2024-04-14 | End: 2024-05-14

## 2024-04-15 ENCOUNTER — ON-DEMAND VIRTUAL (OUTPATIENT)
Dept: URGENT CARE | Facility: CLINIC | Age: 34
End: 2024-04-15

## 2024-04-15 ENCOUNTER — ON-DEMAND VIRTUAL (OUTPATIENT)
Dept: URGENT CARE | Facility: CLINIC | Age: 34
End: 2024-04-15
Payer: MEDICARE

## 2024-04-15 ENCOUNTER — NURSE TRIAGE (OUTPATIENT)
Dept: ADMINISTRATIVE | Facility: CLINIC | Age: 34
End: 2024-04-15
Payer: MEDICARE

## 2024-04-15 DIAGNOSIS — M54.50 CHRONIC BILATERAL LOW BACK PAIN WITHOUT SCIATICA: ICD-10-CM

## 2024-04-15 DIAGNOSIS — R39.9 UTI SYMPTOMS: Primary | ICD-10-CM

## 2024-04-15 DIAGNOSIS — G89.29 CHRONIC BILATERAL LOW BACK PAIN WITHOUT SCIATICA: ICD-10-CM

## 2024-04-15 PROCEDURE — 99202 OFFICE O/P NEW SF 15 MIN: CPT | Mod: 95,,, | Performed by: NURSE PRACTITIONER

## 2024-04-15 RX ORDER — NITROFURANTOIN 25; 75 MG/1; MG/1
100 CAPSULE ORAL 2 TIMES DAILY
Qty: 14 CAPSULE | Refills: 0 | Status: SHIPPED | OUTPATIENT
Start: 2024-04-15 | End: 2024-04-22

## 2024-04-15 NOTE — PROGRESS NOTES
Subjective:      Patient ID: Kendra Russell is a 33 y.o. female.    Vitals:  vitals were not taken for this visit.     Chief Complaint: Dysuria      Visit Type: TELE AUDIOVISUAL    Present with the patient at the time of consultation: TELEMED PRESENT WITH PATIENT: None        Past Medical History:   Diagnosis Date    FHx: bowel obstruction     HA (headache)     Interstitial cystitis     Ovarian cyst     Ulcer      Past Surgical History:   Procedure Laterality Date    BACK SURGERY  2010    had the scar from this surgery fixed recently    CHOLECYSTECTOMY N/A 05/2015    COLON SURGERY  2012    bowel obstruction    MINI-LAPAROTOMY      NOSE SURGERY      RHINOPLASTY TIP      tracheal polyp removed      UPPER GASTROINTESTINAL ENDOSCOPY  09/2017     Review of patient's allergies indicates:   Allergen Reactions    Bismuth subcit [id number] Nausea And Vomiting, Other (See Comments) and Shortness Of Breath     Pt reports dizziness with Pylera    Other Reaction(s): Not available, Other    Pt reports dizziness with Pylera   Pt reports dizziness with Pylera    Ciprofloxacin Rash     Other Reaction(s): Other (See Comments)    Muscle and joint pain (severe).  Very painful x 1 month    Nitrofurantoin monohyd/m-cryst Nausea And Vomiting and Swelling    Sulfamethoxazole-trimethoprim Rash     Fever, joint muscle pain-was hospilized    Other Reaction(s): Other (See Comments)    muscle and joint pains for few weeks    6-iodomethylnorcholesterol i-131 Other (See Comments)    Adhesive tape-silicones     Ciprofloxacin hcl     Escitalopram     Flagyl [metronidazole hcl]     Hydrocodone-acetaminophen      Bad migraines; dizzy, nausea    Ketamine     Metoclopramide      Other reaction(s): Rash, Unspecified    Peanut     Scopolamine      Other reaction(s): Visual alteration (finding)    Toradol [ketorolac]     Zofran [ondansetron hcl (pf)] Other (See Comments)     Pt reports migraine with Zofran    Adhesive Rash     Plastic tape     Compazine [prochlorperazine] Rash    Fosfomycin tromethamine Rash    Gabapentin Nausea And Vomiting     Other Reaction(s): Not available    Meperidine Nausea And Vomiting     Other Reaction(s): Not available    Moxifloxacin Nausea And Vomiting     Other Reaction(s): Not available    Tramadol Nausea And Vomiting     Other Reaction(s): Not available     Current Outpatient Medications on File Prior to Visit   Medication Sig Dispense Refill    cyanocobalamin, vitamin B-12, (VITAMIN B-12) 5,000 mcg/mL Drop Take by mouth once daily.       DULoxetine (CYMBALTA) 30 MG capsule TAKE ONE CAPSULE BY MOUTH DAILY. IN 1-2 WEEKS, IF NO RELIEF, MAY INCREASE DOSE TO TWO capsules DAILY 30 capsule 1    etonogestreL-ethinyl estradioL (NUVARING) 0.12-0.015 mg/24 hr vaginal ring Place 1 each vaginally every 28 days. 3 each 2    oxyCODONE-acetaminophen (PERCOCET)  mg per tablet Take 1 tablet by mouth every 4 (four) hours as needed for Pain. 180 tablet 0    pregabalin (LYRICA) 50 MG capsule TAKE ONE CAPSULE BY MOUTH THREE TIMES DAILY 90 capsule 2    promethazine (PHENERGAN) 25 MG tablet Take 1 tablet (25 mg total) by mouth every 6 (six) hours as needed for Nausea. 20 tablet 0     No current facility-administered medications on file prior to visit.     Family History   Problem Relation Name Age of Onset    Diabetes Mother      Stroke Mother          51 yo    Diabetes Father      Hypertension Father      GI problems Father      Hyperlipidemia Father      Diabetes Maternal Aunt      Diabetes Maternal Uncle      Diabetes Maternal Grandmother      Diabetes Maternal Grandfather      Cancer Paternal Grandmother          bladder    Bladder Cancer Paternal Grandmother      Colon cancer Paternal Grandfather      Breast cancer Neg Hx      Heart disease Neg Hx      Ovarian cancer Neg Hx      Cervical cancer Neg Hx      Vaginal cancer Neg Hx      Endometrial cancer Neg Hx         Medications Ordered                [id number] -  [place] - [address] AT [facility] [address]   [address] [place] [address]    Telephone: [phone]   Fax: [phone]   Hours: Not open 24 hours                         E-Prescribed (1 of 1)              nitrofurantoin, macrocrystal-monohydrate, (MACROBID) 100 MG capsule    Sig: Take 1 capsule (100 mg total) by mouth 2 (two) times daily. for 7 days       Start: 4/15/24     Quantity: 14 capsule Refills: 0                           Ohs Peq Odvv Intake    4/15/2024  3:07 PM CDT - Filed by Patient   What is your current physical address in the event of a medical emergency? [address] [place] [address]   Are you able to take your vital signs? No   Please attach any relevant images or files          32 yo female with c/o dysuria, painful urination and freqeuncy for about two weeks. She denies blood in urine. She denies back pain, positive lower pelvic pain. She denies std. States some odor and low grade fever.     Dysuria   Associated symptoms include frequency and urgency. Pertinent negatives include no nausea or vomiting.       Constitution: Negative. Negative for fever.   HENT: Negative.     Neck: neck negative.   Cardiovascular: Negative.    Respiratory: Negative.     Gastrointestinal: Negative.  Negative for abdominal pain, nausea and vomiting.   Endocrine: negative.   Genitourinary:  Positive for dysuria, frequency and urgency. Negative for vaginal discharge, vaginal odor and genital sore.   Musculoskeletal: Negative.  Negative for back pain.   Skin: Negative.    Neurological: Negative.         Objective:   The physical exam was conducted virtually.  LOCATION OF PATIENT home  Physical Exam   Constitutional: She is oriented to person, place, and time. She appears well-developed.   HENT:   Head: Normocephalic and atraumatic.   Ears:   Right Ear: Hearing, tympanic membrane and external ear normal.   Left Ear: Hearing, tympanic membrane and external ear normal.   Nose: Nose  normal.   Mouth/Throat: Uvula is midline, oropharynx is clear and moist and mucous membranes are normal.   Eyes: Conjunctivae and EOM are normal. Pupils are equal, round, and reactive to light.   Neck: Neck supple.   Cardiovascular: Normal rate.   Pulmonary/Chest: Effort normal and breath sounds normal.   Musculoskeletal: Normal range of motion.         General: Normal range of motion.   Neurological: She is alert and oriented to person, place, and time.   Skin: Skin is warm.   Psychiatric: Her behavior is normal. Thought content normal.   Nursing note and vitals reviewed.      Assessment:     1. UTI symptoms        Plan:     Patient states not allergic to macrobid    PLEASE READ YOUR DISCHARGE INSTRUCTIONS ENTIRELY AS IT CONTAINS IMPORTANT INFORMATION.      Take the antibiotics to completion.     Drink plenty of fluids, wipe front to back, take showers not baths, no scented soaps, wear breathable cotton underwear, urinate after sexual intercourse.     Please go to the ER for worsening symptoms including fever, worsening flank pain, vomiting, etc.       Please return or see your primary care doctor if you develop new or worsening symptoms.     Please arrange follow up with your primary medical clinic as soon as possible. You must understand that you've received an Urgent Care treatment only and that you may be released before all of your medical problems are known or treated. You, the patient, will arrange for follow up as instructed. If your symptoms worsen or fail to improve you should go to the Emergency Room.  WE CANNOT RULE OUT ALL POSSIBLE CAUSES OF YOUR SYMPTOMS IN THE URGENT CARE SETTING PLEASE GO TO THE ER IF YOU FEELS YOUR CONDITION IS WORSENING OR YOU WOULD LIKE EMERGENT EVALUATION.    UTI symptoms  -     nitrofurantoin, macrocrystal-monohydrate, (MACROBID) 100 MG capsule; Take 1 capsule (100 mg total) by mouth 2 (two) times daily. for 7 days  Dispense: 14 capsule; Refill: 0

## 2024-04-15 NOTE — TELEPHONE ENCOUNTER
"Patient states she just did a virtual urgent care visit and she was prescribed an antibiotic. It was suppose to go to [facility] at [address] and they are saying they have not received it. Spoke to pharmacy and they are saying they still have not received the prescription although it's showing it was sent at 3:19p today. Attempted to secure chat the provider, Emma Winston. Provider is on "Do not disturb". Patient advised that she can wait to see if pharmacy will receive the prescription soon or do another ODVV and fee will be waived per my lead Evon. Please contact caller directly to discuss any further care advice.  Reason for Disposition   [1] Follow-up call to recent contact AND [2] information only call, no triage required    Protocols used: Information Only Call-A-    "

## 2024-04-15 NOTE — PROGRESS NOTES
Subjective:      Patient ID: Kendra Russell is a 33 y.o. female.    Vitals:  vitals were not taken for this visit.     Chief Complaint: Error      Visit Type: TELE AUDIOVISUAL    Present with the patient at the time of consultation: TELEMED PRESENT WITH PATIENT: None        Past Medical History:   Diagnosis Date    FHx: bowel obstruction     HA (headache)     Interstitial cystitis     Ovarian cyst     Ulcer      Past Surgical History:   Procedure Laterality Date    BACK SURGERY  2010    had the scar from this surgery fixed recently    CHOLECYSTECTOMY N/A 05/2015    COLON SURGERY  2012    bowel obstruction    MINI-LAPAROTOMY      NOSE SURGERY      RHINOPLASTY TIP      tracheal polyp removed      UPPER GASTROINTESTINAL ENDOSCOPY  09/2017     Review of patient's allergies indicates:   Allergen Reactions    Bismuth subcit [id number] Nausea And Vomiting, Other (See Comments) and Shortness Of Breath     Pt reports dizziness with Pylera    Other Reaction(s): Not available, Other    Pt reports dizziness with Pylera   Pt reports dizziness with Pylera    Ciprofloxacin Rash     Other Reaction(s): Other (See Comments)    Muscle and joint pain (severe).  Very painful x 1 month    Nitrofurantoin monohyd/m-cryst Nausea And Vomiting and Swelling    Sulfamethoxazole-trimethoprim Rash     Fever, joint muscle pain-was hospilized    Other Reaction(s): Other (See Comments)    muscle and joint pains for few weeks    6-iodomethylnorcholesterol i-131 Other (See Comments)    Adhesive tape-silicones     Ciprofloxacin hcl     Escitalopram     Flagyl [metronidazole hcl]     Hydrocodone-acetaminophen      Bad migraines; dizzy, nausea    Ketamine     Metoclopramide      Other reaction(s): Rash, Unspecified    Peanut     Scopolamine      Other reaction(s): Visual alteration (finding)    Toradol [ketorolac]     Zofran [ondansetron hcl (pf)] Other (See Comments)     Pt reports migraine with Zofran    Adhesive Rash     Plastic tape     Compazine [prochlorperazine] Rash    Fosfomycin tromethamine Rash    Gabapentin Nausea And Vomiting     Other Reaction(s): Not available    Meperidine Nausea And Vomiting     Other Reaction(s): Not available    Moxifloxacin Nausea And Vomiting     Other Reaction(s): Not available    Tramadol Nausea And Vomiting     Other Reaction(s): Not available     Current Outpatient Medications on File Prior to Visit   Medication Sig Dispense Refill    cyanocobalamin, vitamin B-12, (VITAMIN B-12) 5,000 mcg/mL Drop Take by mouth once daily.       DULoxetine (CYMBALTA) 30 MG capsule TAKE ONE CAPSULE BY MOUTH DAILY. IN 1-2 WEEKS, IF NO RELIEF, MAY INCREASE DOSE TO TWO capsules DAILY 30 capsule 1    etonogestreL-ethinyl estradioL (NUVARING) 0.12-0.015 mg/24 hr vaginal ring Place 1 each vaginally every 28 days. 3 each 2    nitrofurantoin, macrocrystal-monohydrate, (MACROBID) 100 MG capsule Take 1 capsule (100 mg total) by mouth 2 (two) times daily. for 7 days 14 capsule 0    oxyCODONE-acetaminophen (PERCOCET)  mg per tablet Take 1 tablet by mouth every 4 (four) hours as needed for Pain. 180 tablet 0    pregabalin (LYRICA) 50 MG capsule TAKE ONE CAPSULE BY MOUTH THREE TIMES DAILY 90 capsule 2    promethazine (PHENERGAN) 25 MG tablet Take 1 tablet (25 mg total) by mouth every 6 (six) hours as needed for Nausea. 20 tablet 0     No current facility-administered medications on file prior to visit.     Family History   Problem Relation Name Age of Onset    Diabetes Mother      Stroke Mother          51 yo    Diabetes Father      Hypertension Father      GI problems Father      Hyperlipidemia Father      Diabetes Maternal Aunt      Diabetes Maternal Uncle      Diabetes Maternal Grandmother      Diabetes Maternal Grandfather      Cancer Paternal Grandmother          bladder    Bladder Cancer Paternal Grandmother      Colon cancer Paternal Grandfather      Breast cancer Neg Hx      Heart disease Neg Hx      Ovarian cancer Neg Hx       Cervical cancer Neg Hx      Vaginal cancer Neg Hx      Endometrial cancer Neg Hx         Medications Ordered                [id number] [place] - [address]   [address] [place] [address]    Telephone: [phone]   Fax: [phone]   Hours: Not open 24 hours                         E-Prescribed (1 of 1)              nitrofurantoin, macrocrystal-monohydrate, (MACROBID) 100 MG capsule    Sig: Take 1 capsule (100 mg total) by mouth 2 (two) times daily. for 7 days       Start: 4/15/24     Quantity: 14 capsule Refills: 0                           Ohs Peq Odvv Intake    4/15/2024  5:43 PM CDT - Filed by Patient   What is your current physical address in the event of a medical emergency? [address] [place] [address]   Are you able to take your vital signs? No   Please attach any relevant images or files          Pharmacy did not get rx      ROS     Objective:   The physical exam was conducted virtually.  LOCATION OF PATIENT home  Physical Exam    Assessment:     1. UTI symptoms    2. ERRONEOUS ENCOUNTER--DISREGARD        Plan:       UTI symptoms  -     nitrofurantoin, macrocrystal-monohydrate, (MACROBID) 100 MG capsule; Take 1 capsule (100 mg total) by mouth 2 (two) times daily. for 7 days  Dispense: 14 capsule; Refill: 0    ERRONEOUS ENCOUNTER--DISREGARD                  This encounter was created in error - please disregard.

## 2024-04-16 RX ORDER — OXYCODONE AND ACETAMINOPHEN 10; 325 MG/1; MG/1
1 TABLET ORAL EVERY 4 HOURS PRN
Qty: 180 TABLET | Refills: 0 | Status: SHIPPED | OUTPATIENT
Start: 2024-04-19 | End: 2024-05-18 | Stop reason: SDUPTHER

## 2024-05-16 RX ORDER — ETONOGESTREL AND ETHINYL ESTRADIOL VAGINAL RING .015; .12 MG/D; MG/D
1 RING VAGINAL
Qty: 3 EACH | Refills: 2 | OUTPATIENT
Start: 2024-05-16

## 2024-05-18 DIAGNOSIS — G89.29 CHRONIC BILATERAL LOW BACK PAIN WITHOUT SCIATICA: ICD-10-CM

## 2024-05-18 DIAGNOSIS — M54.50 CHRONIC BILATERAL LOW BACK PAIN WITHOUT SCIATICA: ICD-10-CM

## 2024-05-18 RX ORDER — OXYCODONE AND ACETAMINOPHEN 10; 325 MG/1; MG/1
1 TABLET ORAL EVERY 4 HOURS PRN
Qty: 180 TABLET | Refills: 0 | Status: CANCELLED | OUTPATIENT
Start: 2024-05-18 | End: 2024-06-17

## 2024-05-20 RX ORDER — OXYCODONE AND ACETAMINOPHEN 10; 325 MG/1; MG/1
1 TABLET ORAL EVERY 4 HOURS PRN
Qty: 180 TABLET | Refills: 0 | Status: SHIPPED | OUTPATIENT
Start: 2024-05-22 | End: 2024-06-17 | Stop reason: SDUPTHER

## 2024-06-07 ENCOUNTER — ON-DEMAND VIRTUAL (OUTPATIENT)
Dept: URGENT CARE | Facility: CLINIC | Age: 34
End: 2024-06-07
Payer: MEDICARE

## 2024-06-07 DIAGNOSIS — N30.90 CYSTITIS: Primary | ICD-10-CM

## 2024-06-07 PROCEDURE — 99213 OFFICE O/P EST LOW 20 MIN: CPT | Mod: 95,,, | Performed by: PHYSICIAN ASSISTANT

## 2024-06-07 RX ORDER — NITROFURANTOIN 25; 75 MG/1; MG/1
100 CAPSULE ORAL 2 TIMES DAILY
Qty: 10 CAPSULE | Refills: 0 | Status: SHIPPED | OUTPATIENT
Start: 2024-06-07 | End: 2024-06-12

## 2024-06-07 RX ORDER — FLUCONAZOLE 150 MG/1
150 TABLET ORAL DAILY
Qty: 2 TABLET | Refills: 0 | Status: SHIPPED | OUTPATIENT
Start: 2024-06-07 | End: 2024-06-08

## 2024-06-07 NOTE — PROGRESS NOTES
dvv  Subjective:      Patient ID: Kendra Russell is a 33 y.o. female.    Vitals:  vitals were not taken for this visit.     Chief Complaint: Urinary Tract Infection      Visit Type: TELE AUDIOVISUAL    Present with the patient at the time of consultation: TELEMED PRESENT WITH PATIENT: Noneat home in [place]    Past Medical History:   Diagnosis Date    FHx: bowel obstruction     HA (headache)     Interstitial cystitis     Ovarian cyst     Ulcer      Past Surgical History:   Procedure Laterality Date    BACK SURGERY  2010    had the scar from this surgery fixed recently    CHOLECYSTECTOMY N/A 05/2015    COLON SURGERY  2012    bowel obstruction    MINI-LAPAROTOMY      NOSE SURGERY      RHINOPLASTY TIP      tracheal polyp removed      UPPER GASTROINTESTINAL ENDOSCOPY  09/2017     Review of patient's allergies indicates:   Allergen Reactions    Bismuth subcit [id number] Nausea And Vomiting, Other (See Comments) and Shortness Of Breath     Pt reports dizziness with Pylera    Other Reaction(s): Not available, Other    Pt reports dizziness with Pylera   Pt reports dizziness with Pylera    Ciprofloxacin Rash     Other Reaction(s): Other (See Comments)    Muscle and joint pain (severe).  Very painful x 1 month    Nitrofurantoin monohyd/m-cryst Nausea And Vomiting and Swelling    Sulfamethoxazole-trimethoprim Rash     Fever, joint muscle pain-was hospilized    Other Reaction(s): Other (See Comments)    muscle and joint pains for few weeks    6-iodomethylnorcholesterol i-131 Other (See Comments)    Adhesive tape-silicones     Ciprofloxacin hcl     Escitalopram     Flagyl [metronidazole hcl]     Hydrocodone-acetaminophen      Bad migraines; dizzy, nausea    Ketamine     Metoclopramide      Other reaction(s): Rash, Unspecified    Peanut     Scopolamine      Other reaction(s): Visual alteration (finding)    Toradol [ketorolac]     Zofran [ondansetron hcl (pf)] Other (See Comments)     Pt reports migraine with Zofran    Adhesive  Rash     Plastic tape    Compazine [prochlorperazine] Rash    Fosfomycin tromethamine Rash    Gabapentin Nausea And Vomiting     Other Reaction(s): Not available    Meperidine Nausea And Vomiting     Other Reaction(s): Not available    Moxifloxacin Nausea And Vomiting     Other Reaction(s): Not available    Tramadol Nausea And Vomiting     Other Reaction(s): Not available     Current Outpatient Medications on File Prior to Visit   Medication Sig Dispense Refill    cyanocobalamin, vitamin B-12, (VITAMIN B-12) 5,000 mcg/mL Drop Take by mouth once daily.       DULoxetine (CYMBALTA) 30 MG capsule TAKE ONE CAPSULE BY MOUTH DAILY. IN 1-2 WEEKS, IF NO RELIEF, MAY INCREASE DOSE TO TWO capsules DAILY 30 capsule 1    esomeprazole (NEXIUM) 40 MG capsule Take 1 tablet by mouth once daily 30 capsule 2    etonogestreL-ethinyl estradioL (NUVARING) 0.12-0.015 mg/24 hr vaginal ring Place 1 each vaginally every 28 days. 3 each 2    oxyCODONE-acetaminophen (PERCOCET)  mg per tablet Take 1 tablet by mouth every 4 (four) hours as needed for Pain. 180 tablet 0    pregabalin (LYRICA) 50 MG capsule TAKE ONE CAPSULE BY MOUTH THREE TIMES DAILY 90 capsule 2    promethazine (PHENERGAN) 25 MG tablet Take 1 tablet (25 mg total) by mouth every 6 (six) hours as needed for Nausea. 20 tablet 0     No current facility-administered medications on file prior to visit.     Family History   Problem Relation Name Age of Onset    Diabetes Mother      Stroke Mother          51 yo    Diabetes Father      Hypertension Father      GI problems Father      Hyperlipidemia Father      Diabetes Maternal Aunt      Diabetes Maternal Uncle      Diabetes Maternal Grandmother      Diabetes Maternal Grandfather      Cancer Paternal Grandmother          bladder    Bladder Cancer Paternal Grandmother      Colon cancer Paternal Grandfather      Breast cancer Neg Hx      Heart disease Neg Hx      Ovarian cancer Neg Hx      Cervical cancer Neg Hx      Vaginal cancer Neg  Hx      Endometrial cancer Neg Hx         Medications Ordered                [id number] [place] [facility] [address]   [address] [place] [address]    Telephone: [phone]   Fax: [phone]   Hours: Not open 24 hours                         E-Prescribed (2 of 2)              fluconazole (DIFLUCAN) 150 MG Tab    Sig: Take 1 tablet (150 mg total) by mouth once daily. Repeat second dose in 72 hours if not completely resolved. for 1 day       Start: 6/7/24     Quantity: 2 tablet Refills: 0                         nitrofurantoin, macrocrystal-monohydrate, (MACROBID) 100 MG capsule    Sig: Take 1 capsule (100 mg total) by mouth 2 (two) times daily. for 5 days       Start: 6/7/24     Quantity: 10 capsule Refills: 0                           Ohs Peq Odvv Intake    6/7/2024 12:54 PM CDT - Filed by Patient   What is your current physical address in the event of a medical emergency? [address] [place], [address]   Are you able to take your vital signs? No   Please attach any relevant images or files          HPI  32yo female presents with c/o UTI symptoms x 1.5 weeks - urine cloudy with odor, dysuria, pelvic pain/discomfort, low grade fever (<100).     Denies POP, STD risk.     Hx of UTIs. States urine cx done just over month ago - outside of [facility] - positive for ecoli, sensitive to macrobid per patient.     Requests diflucan with antibiotics.           Constitution: Positive for fever (<100). Negative for chills.   Gastrointestinal:  Negative for abdominal pain, nausea and vomiting.   Genitourinary:  Positive for dysuria, frequency, urgency and pelvic pain. Negative for hematuria, history of kidney stones, vaginal discharge and vaginal bleeding.        Objective:   The physical exam was conducted virtually.  Physical Exam   Constitutional: She is oriented to person, place, and time.  Non-toxic appearance. She does not appear ill. No distress.   HENT:   Head: Normocephalic and atraumatic.    Pulmonary/Chest: Effort normal. No respiratory distress. She has no wheezes.   Abdominal: Normal appearance. She exhibits no distension. Soft. There is no abdominal tenderness. There is no left CVA tenderness and no right CVA tenderness.   Neurological: She is alert and oriented to person, place, and time. Coordination normal.   Skin: Skin is not diaphoretic, not pale and no rash.   Psychiatric: Her behavior is normal. Judgment and thought content normal.       Assessment:     1. Cystitis        Plan:       Cystitis    Other orders  -     nitrofurantoin, macrocrystal-monohydrate, (MACROBID) 100 MG capsule; Take 1 capsule (100 mg total) by mouth 2 (two) times daily. for 5 days  Dispense: 10 capsule; Refill: 0  -     fluconazole (DIFLUCAN) 150 MG Tab; Take 1 tablet (150 mg total) by mouth once daily. Repeat second dose in 72 hours if not completely resolved. for 1 day  Dispense: 2 tablet; Refill: 0    Allergies reviewed - denies allergy to macrobid, has taken in past without reactions.    1. Start the antibiotic that was sent to your pharmacy, please take as directed and complete entire course.   2. Push fluids to stay well hydrated, may also drink cranberry juice to help fight the infection.  3. Recheck in two days at local urgent care if no improvement sooner if worsening pain, fevers, vomiting, flank pain, persistent blood in urine or other concerns.  4. You must understand that you've received a Telehealth Urgent Care treatment only and that you may be released before all your medical problems are known or treated. You, the patient, will arrange for follow up care as instructed.    Verbally discussed plan and patient confirms understanding

## 2024-06-17 ENCOUNTER — PATIENT MESSAGE (OUTPATIENT)
Dept: PHYSICAL MEDICINE AND REHAB | Facility: CLINIC | Age: 34
End: 2024-06-17
Payer: MEDICARE

## 2024-06-17 DIAGNOSIS — M54.41 CHRONIC BILATERAL LOW BACK PAIN WITH RIGHT-SIDED SCIATICA: ICD-10-CM

## 2024-06-17 DIAGNOSIS — M54.50 CHRONIC BILATERAL LOW BACK PAIN WITHOUT SCIATICA: ICD-10-CM

## 2024-06-17 DIAGNOSIS — G89.29 CHRONIC NECK PAIN: Primary | ICD-10-CM

## 2024-06-17 DIAGNOSIS — M54.2 CHRONIC NECK PAIN: Primary | ICD-10-CM

## 2024-06-17 DIAGNOSIS — G89.29 CHRONIC BILATERAL LOW BACK PAIN WITH RIGHT-SIDED SCIATICA: ICD-10-CM

## 2024-06-17 DIAGNOSIS — G89.29 CHRONIC BILATERAL LOW BACK PAIN WITHOUT SCIATICA: ICD-10-CM

## 2024-06-17 RX ORDER — OXYCODONE AND ACETAMINOPHEN 10; 325 MG/1; MG/1
1 TABLET ORAL EVERY 4 HOURS PRN
Qty: 180 TABLET | Refills: 0 | Status: SHIPPED | OUTPATIENT
Start: 2024-06-21 | End: 2024-07-21

## 2024-07-04 ENCOUNTER — ON-DEMAND VIRTUAL (OUTPATIENT)
Dept: URGENT CARE | Facility: CLINIC | Age: 34
End: 2024-07-04
Payer: MEDICARE

## 2024-07-04 DIAGNOSIS — R39.9 UTI SYMPTOMS: Primary | ICD-10-CM

## 2024-07-04 RX ORDER — NITROFURANTOIN 25; 75 MG/1; MG/1
100 CAPSULE ORAL 2 TIMES DAILY
Qty: 10 CAPSULE | Refills: 0 | Status: SHIPPED | OUTPATIENT
Start: 2024-07-04 | End: 2024-07-09

## 2024-07-04 NOTE — PROGRESS NOTES
Subjective:      Patient ID: Kendra Russell is a 33 y.o. female.    Vitals:  vitals were not taken for this visit.     Chief Complaint: Dysuria      Visit Type: TELE AUDIOVISUAL    Present with the patient at the time of consultation: TELEMED PRESENT WITH PATIENT: None        Past Medical History:   Diagnosis Date    FHx: bowel obstruction     HA (headache)     Interstitial cystitis     Ovarian cyst     Ulcer      Past Surgical History:   Procedure Laterality Date    BACK SURGERY  2010    had the scar from this surgery fixed recently    CHOLECYSTECTOMY N/A 05/2015    COLON SURGERY  2012    bowel obstruction    MINI-LAPAROTOMY      NOSE SURGERY      RHINOPLASTY TIP      tracheal polyp removed      UPPER GASTROINTESTINAL ENDOSCOPY  09/2017     Review of patient's allergies indicates:   Allergen Reactions    Bismuth subcit [id number] Nausea And Vomiting, Other (See Comments) and Shortness Of Breath     Pt reports dizziness with Pylera    Other Reaction(s): Not available, Other    Pt reports dizziness with Pylera   Pt reports dizziness with Pylera    Ciprofloxacin Rash     Other Reaction(s): Other (See Comments)    Muscle and joint pain (severe).  Very painful x 1 month    Sulfamethoxazole-trimethoprim Rash     Fever, joint muscle pain-was hospilized    Other Reaction(s): Other (See Comments)    muscle and joint pains for few weeks    6-iodomethylnorcholesterol i-131 Other (See Comments)    Adhesive tape-silicones     Ciprofloxacin hcl     Escitalopram     Flagyl [metronidazole hcl]     Hydrocodone-acetaminophen      Bad migraines; dizzy, nausea    Ketamine     Metoclopramide      Other reaction(s): Rash, Unspecified    Peanut     Scopolamine      Other reaction(s): Visual alteration (finding)    Toradol [ketorolac]     Zofran [ondansetron hcl (pf)] Other (See Comments)     Pt reports migraine with Zofran    Adhesive Rash     Plastic tape    Compazine [prochlorperazine] Rash    Fosfomycin tromethamine Rash     Gabapentin Nausea And Vomiting     Other Reaction(s): Not available    Meperidine Nausea And Vomiting     Other Reaction(s): Not available    Moxifloxacin Nausea And Vomiting     Other Reaction(s): Not available    Tramadol Nausea And Vomiting     Other Reaction(s): Not available     Current Outpatient Medications on File Prior to Visit   Medication Sig Dispense Refill    cyanocobalamin, vitamin B-12, (VITAMIN B-12) 5,000 mcg/mL Drop Take by mouth once daily.       esomeprazole (NEXIUM) 40 MG capsule Take 1 tablet by mouth once daily 30 capsule 2    etonogestreL-ethinyl estradioL (NUVARING) 0.12-0.015 mg/24 hr vaginal ring Place 1 each vaginally every 28 days. 3 each 2    oxyCODONE-acetaminophen (PERCOCET)  mg per tablet Take 1 tablet by mouth every 4 (four) hours as needed for Pain. 180 tablet 0    promethazine (PHENERGAN) 25 MG tablet Take 1 tablet (25 mg total) by mouth every 6 (six) hours as needed for Nausea. 20 tablet 0     No current facility-administered medications on file prior to visit.     Family History   Problem Relation Name Age of Onset    Diabetes Mother      Stroke Mother          53 yo    Diabetes Father      Hypertension Father      GI problems Father      Hyperlipidemia Father      Diabetes Maternal Aunt      Diabetes Maternal Uncle      Diabetes Maternal Grandmother      Diabetes Maternal Grandfather      Cancer Paternal Grandmother          bladder    Bladder Cancer Paternal Grandmother      Colon cancer Paternal Grandfather      Breast cancer Neg Hx      Heart disease Neg Hx      Ovarian cancer Neg Hx      Cervical cancer Neg Hx      Vaginal cancer Neg Hx      Endometrial cancer Neg Hx         Medications Ordered                [facility] [place] - [address]   [address] [place] [address]    Telephone: [phone]   Fax: [phone]   Hours: Not open 24 hours                         E-Prescribed (1 of 1)              nitrofurantoin,  macrocrystal-monohydrate, (MACROBID) 100 MG capsule    Sig: Take 1 capsule (100 mg total) by mouth 2 (two) times daily. for 5 days       Start: 7/4/24     Quantity: 10 capsule Refills: 0                           Ohs Peq Odvv Intake    7/4/2024 10:41 AM CDT - Filed by Patient   What is your current physical address in the event of a medical emergency? [address] [place] [address]   Are you able to take your vital signs? No   Please attach any relevant images or files          32 yo female with c/o dark urine, lower abdominal discomfort. Dysuria. She states symptoms for one week. She denies hematuria. She states she had a uti a few weeks ago and did macrobid. Note- she has macrobid as allergy but she is not allergic.she states it did help and culture showed sensitive to macrobid but symptoms came back . She states she has appt with her urologist soon.         Constitution: Negative. Negative for fever.   HENT: Negative.     Neck: neck negative.   Cardiovascular: Negative.    Respiratory: Negative.     Gastrointestinal: Negative.  Negative for abdominal pain, nausea and vomiting.   Endocrine: negative.   Genitourinary:  Positive for dysuria, frequency and urgency. Negative for vaginal discharge, vaginal odor and genital sore.   Musculoskeletal: Negative.  Negative for back pain.   Skin: Negative.    Neurological: Negative.         Objective:   The physical exam was conducted virtually.  LOCATION OF PATIENT home  Physical Exam   Constitutional: She is oriented to person, place, and time. She appears well-developed.   HENT:   Head: Normocephalic and atraumatic.   Ears:   Right Ear: Hearing, tympanic membrane and external ear normal.   Left Ear: Hearing, tympanic membrane and external ear normal.   Nose: Nose normal.   Mouth/Throat: Uvula is midline, oropharynx is clear and moist and mucous membranes are normal.   Eyes: Conjunctivae and EOM are normal. Pupils are equal, round, and reactive to light.   Neck: Neck supple.    Cardiovascular: Normal rate.   Pulmonary/Chest: Effort normal and breath sounds normal.   Musculoskeletal: Normal range of motion.         General: Normal range of motion.   Neurological: She is alert and oriented to person, place, and time.   Skin: Skin is warm.   Psychiatric: Her behavior is normal. Thought content normal.   Nursing note and vitals reviewed.      Assessment:     No diagnosis found.    Plan:   PLEASE READ YOUR DISCHARGE INSTRUCTIONS ENTIRELY AS IT CONTAINS IMPORTANT INFORMATION.      Take the antibiotics to completion.     Drink plenty of fluids, wipe front to back, take showers not baths, no scented soaps, wear breathable cotton underwear, urinate after sexual intercourse.     Please go to the ER for worsening symptoms including fever, worsening flank pain, vomiting, etc.       Please return or see your primary care doctor if you develop new or worsening symptoms.     Please arrange follow up with your primary medical clinic as soon as possible. You must understand that you've received an Urgent Care treatment only and that you may be released before all of your medical problems are known or treated. You, the patient, will arrange for follow up as instructed. If your symptoms worsen or fail to improve you should go to the Emergency Room.  WE CANNOT RULE OUT ALL POSSIBLE CAUSES OF YOUR SYMPTOMS IN THE URGENT CARE SETTING PLEASE GO TO THE ER IF YOU FEELS YOUR CONDITION IS WORSENING OR YOU WOULD LIKE EMERGENT EVALUATION.      There are no diagnoses linked to this encounter.

## 2024-07-17 DIAGNOSIS — G89.29 CHRONIC BILATERAL LOW BACK PAIN WITHOUT SCIATICA: ICD-10-CM

## 2024-07-17 DIAGNOSIS — M54.50 CHRONIC BILATERAL LOW BACK PAIN WITHOUT SCIATICA: ICD-10-CM

## 2024-07-17 RX ORDER — OXYCODONE AND ACETAMINOPHEN 10; 325 MG/1; MG/1
1 TABLET ORAL EVERY 4 HOURS PRN
Qty: 180 TABLET | Refills: 0 | Status: SHIPPED | OUTPATIENT
Start: 2024-07-21 | End: 2024-08-20

## 2024-07-23 ENCOUNTER — ON-DEMAND VIRTUAL (OUTPATIENT)
Dept: URGENT CARE | Facility: CLINIC | Age: 34
End: 2024-07-23
Payer: MEDICARE

## 2024-07-23 ENCOUNTER — TELEPHONE (OUTPATIENT)
Dept: UROLOGY | Facility: CLINIC | Age: 34
End: 2024-07-23
Payer: MEDICARE

## 2024-07-23 DIAGNOSIS — R39.9 UTI SYMPTOMS: Primary | ICD-10-CM

## 2024-07-23 PROCEDURE — 99212 OFFICE O/P EST SF 10 MIN: CPT | Mod: 95,,, | Performed by: FAMILY MEDICINE

## 2024-07-23 RX ORDER — NITROFURANTOIN 25; 75 MG/1; MG/1
100 CAPSULE ORAL 2 TIMES DAILY
Qty: 14 CAPSULE | Refills: 0 | Status: SHIPPED | OUTPATIENT
Start: 2024-07-23 | End: 2024-07-30

## 2024-07-23 NOTE — TELEPHONE ENCOUNTER
----- Message from Jason Osborn sent at 7/23/2024  9:43 AM CDT -----  Type:  Patient Returning Call    Who Called:pt  Who Left Message for Patient:ma or nurse   Does the patient know what this is regarding?:appointment   Would the patient rather a call back or a response via [facility]? call  Best Call Back Number: [phone]  Additional Information:

## 2024-07-23 NOTE — PROGRESS NOTES
Subjective:      Patient ID: Kendra Russell is a 33 y.o. female.    Vitals:  vitals were not taken for this visit.     Chief Complaint: Urinary Tract Infection (Urinary symptoms)      Visit Type: TELE AUDIOVISUAL    Present with the patient at the time of consultation: TELEMED PRESENT WITH PATIENT: None    Past Medical History:   Diagnosis Date    FHx: bowel obstruction     HA (headache)     Interstitial cystitis     Ovarian cyst     Ulcer      Past Surgical History:   Procedure Laterality Date    BACK SURGERY  2010    had the scar from this surgery fixed recently    CHOLECYSTECTOMY N/A 05/2015    COLON SURGERY  2012    bowel obstruction    MINI-LAPAROTOMY      NOSE SURGERY      RHINOPLASTY TIP      tracheal polyp removed      UPPER GASTROINTESTINAL ENDOSCOPY  09/2017     Review of patient's allergies indicates:   Allergen Reactions    Bismuth subcit [id number] Nausea And Vomiting, Other (See Comments) and Shortness Of Breath     Pt reports dizziness with Pylera    Other Reaction(s): Not available, Other    Pt reports dizziness with Pylera   Pt reports dizziness with Pylera    Ciprofloxacin Rash     Other Reaction(s): Other (See Comments)    Muscle and joint pain (severe).  Very painful x 1 month    Sulfamethoxazole-trimethoprim Rash     Fever, joint muscle pain-was hospilized    Other Reaction(s): Other (See Comments)    muscle and joint pains for few weeks    6-iodomethylnorcholesterol i-131 Other (See Comments)    Adhesive tape-silicones     Ciprofloxacin hcl     Escitalopram     Flagyl [metronidazole hcl]     Hydrocodone-acetaminophen      Bad migraines; dizzy, nausea    Ketamine     Metoclopramide      Other reaction(s): Rash, Unspecified    Peanut     Scopolamine      Other reaction(s): Visual alteration (finding)    Toradol [ketorolac]     Zofran [ondansetron hcl (pf)] Other (See Comments)     Pt reports migraine with Zofran    Adhesive Rash     Plastic tape    Compazine  [prochlorperazine] Rash    Fosfomycin tromethamine Rash    Gabapentin Nausea And Vomiting     Other Reaction(s): Not available    Meperidine Nausea And Vomiting     Other Reaction(s): Not available    Moxifloxacin Nausea And Vomiting     Other Reaction(s): Not available    Tramadol Nausea And Vomiting     Other Reaction(s): Not available     Current Outpatient Medications on File Prior to Visit   Medication Sig Dispense Refill    cyanocobalamin, vitamin B-12, (VITAMIN B-12) 5,000 mcg/mL Drop Take by mouth once daily.       esomeprazole (NEXIUM) 40 MG capsule Take 1 tablet by mouth once daily 30 capsule 2    etonogestreL-ethinyl estradioL (NUVARING) 0.12-0.015 mg/24 hr vaginal ring Place 1 each vaginally every 28 days. 3 each 2    oxyCODONE-acetaminophen (PERCOCET)  mg per tablet Take 1 tablet by mouth every 4 (four) hours as needed for Pain. 180 tablet 0    promethazine (PHENERGAN) 25 MG tablet Take 1 tablet (25 mg total) by mouth every 6 (six) hours as needed for Nausea. 20 tablet 0     No current facility-administered medications on file prior to visit.     Family History   Problem Relation Name Age of Onset    Diabetes Mother      Stroke Mother          51 yo    Diabetes Father      Hypertension Father      GI problems Father      Hyperlipidemia Father      Diabetes Maternal Aunt      Diabetes Maternal Uncle      Diabetes Maternal Grandmother      Diabetes Maternal Grandfather      Cancer Paternal Grandmother          bladder    Bladder Cancer Paternal Grandmother      Colon cancer Paternal Grandfather      Breast cancer Neg Hx      Heart disease Neg Hx      Ovarian cancer Neg Hx      Cervical cancer Neg Hx      Vaginal cancer Neg Hx      Endometrial cancer Neg Hx         Medications Ordered                [id number] [place] - [address]   [address] [place] [address]    Telephone: [phone]   Fax: [phone]   Hours: Not open 24 hours                          E-Prescribed (1 of 1)              nitrofurantoin, macrocrystal-monohydrate, (MACROBID) 100 MG capsule    Sig: Take 1 capsule (100 mg total) by mouth 2 (two) times daily. for 7 days       Start: 7/23/24     Quantity: 14 capsule Refills: 0                           Ohs Peq Odvv Intake    7/23/2024  9:25 AM CDT - Filed by Patient   What is your current physical address in the event of a medical emergency? [address] [place], [address]   Are you able to take your vital signs? No   Please attach any relevant images or files          34 yo female presents with urinary symptoms of 1 week.  Patient is located at home for her appointment.  Notes that her urine is cloudy.  Has a follow up with Urology in 1 week.    Urinary Tract Infection   Associated symptoms include frequency and urgency.       Genitourinary:  Positive for dysuria, frequency and urgency.        Objective:   The physical exam was conducted virtually.  Physical Exam   Constitutional: She is oriented to person, place, and time. She does not appear ill. No distress.   Pulmonary/Chest: Effort normal. No respiratory distress.   Neurological: She is alert and oriented to person, place, and time.       Assessment:     1. UTI symptoms        Plan:       UTI symptoms  -     nitrofurantoin, macrocrystal-monohydrate, (MACROBID) 100 MG capsule; Take 1 capsule (100 mg total) by mouth 2 (two) times daily. for 7 days  Dispense: 14 capsule; Refill: 0      Please take your medicine with food.

## 2024-07-23 NOTE — TELEPHONE ENCOUNTER
Called patient regarding a voicemail left on her recorde yesterday. Call transferred to MIKAL Ritter .

## 2024-07-24 ENCOUNTER — TELEPHONE (OUTPATIENT)
Dept: OBSTETRICS AND GYNECOLOGY | Facility: CLINIC | Age: 34
End: 2024-07-24
Payer: MEDICARE

## 2024-07-24 ENCOUNTER — TELEPHONE (OUTPATIENT)
Dept: PHYSICAL MEDICINE AND REHAB | Facility: CLINIC | Age: 34
End: 2024-07-24
Payer: MEDICARE

## 2024-07-24 NOTE — TELEPHONE ENCOUNTER
----- Message from Jessica Horton sent at 7/24/2024 10:40 AM CDT -----  Type:  RX Refill Request    Who Called: pt  Refill or New Rx:refill  RX Name and Strength:  etonogestreL-ethinyl estradioL (NUVARING) 0.12-0.015 mg/24 hr vaginal ring 3 each 2 6/14/2023 -  Sig: Place 1 each vaginally every 28 days.    Preferred Pharmacy with phone number:  [id number] [place] - [address]  [address] [place] [address]  Phone: [phone] Fax: [phone]    Would the patient rather a call back or a response via [facility]? Call back  Best Call Back Number:[phone]    Additional Information: Previously seen by Dr. Moreno for 3 years. She has an appointment 9/19, no earlier appt. She is request a refill on her Nuvaring until her appointment date. Pt states she has been on it for 13 years now.

## 2024-07-24 NOTE — TELEPHONE ENCOUNTER
----- Message from Sanna Cooper sent at 7/24/2024 10:24 AM CDT -----  Regarding: Appt r/s / Advise  Contact: [phone]  Kendra Russell calling regarding Appointment Access  (message) for # pt is calling to see if there is any other appts in Aug she can r/s to or in Sept pls call to r/s she does not want to cancel her appt yet until she knows she can r/s pls call to advise

## 2024-07-26 ENCOUNTER — TELEPHONE (OUTPATIENT)
Dept: OBSTETRICS AND GYNECOLOGY | Facility: CLINIC | Age: 34
End: 2024-07-26
Payer: MEDICARE

## 2024-07-26 RX ORDER — ETONOGESTREL AND ETHINYL ESTRADIOL VAGINAL RING .015; .12 MG/D; MG/D
1 RING VAGINAL
Qty: 3 EACH | Refills: 2 | Status: SHIPPED | OUTPATIENT
Start: 2024-07-26

## 2024-07-26 NOTE — TELEPHONE ENCOUNTER
"----- Message from Ajit Espana sent at 7/26/2024  9:09 AM CDT -----    Type:  RX Refill Request    Who Called:  pt  Refill or New Rx:  RX Name and Strength: nuva ring (pt said)    [facility] [address] [facility] [place]    Ordering Provider:  Reach pt via:  Best Call Back Number:   [phone]  Additional Information: pt said to note this is a "time sensitive" rx due to her getting ovarian cysts if she doesn't get this medication  "

## 2024-07-26 NOTE — TELEPHONE ENCOUNTER
"----- Message from Ajit Espana sent at 7/26/2024  9:17 AM CDT -----  Pt Requesting Call Back    Who called: pt  Who called for pt:  Best call back #: [phone]    Add notes: pt said she would like to speak to someone regarding getting her nuva ring rx filled; pt said to note this is a "time sensitive" rx due to her getting ovarian cysts if she doesn't get this medication; pt said if anyone have questions about this request they can call her; pt said to note in this message this is her second attempt to get in touch with the office with her first call being on Wednesday    Pt requested this call back message and rx request message both be put in  "

## 2024-07-26 NOTE — TELEPHONE ENCOUNTER
Spoke with pt to notify her refill request has been sent to provider and is awaiting an response. Pt verbalized understanding.

## 2024-07-26 NOTE — TELEPHONE ENCOUNTER
This is a previous pt of Dr Moreno who has not been seen in three years but is requesting bc refill. Has annual appt set for 9/19.    Please advise

## 2024-08-15 DIAGNOSIS — G89.29 CHRONIC BILATERAL LOW BACK PAIN WITHOUT SCIATICA: ICD-10-CM

## 2024-08-15 DIAGNOSIS — M54.50 CHRONIC BILATERAL LOW BACK PAIN WITHOUT SCIATICA: ICD-10-CM

## 2024-08-15 RX ORDER — OXYCODONE AND ACETAMINOPHEN 10; 325 MG/1; MG/1
1 TABLET ORAL EVERY 4 HOURS PRN
Qty: 180 TABLET | Refills: 0 | Status: SHIPPED | OUTPATIENT
Start: 2024-08-20 | End: 2024-09-19

## 2024-08-15 NOTE — TELEPHONE ENCOUNTER
Pt last seen 6/2/23     oxyCODONE-acetaminophen (PERCOCET)  mg per tablet         Sig: Take 1 tablet by mouth every 4 (four) hours as needed for Pain.    Disp: 180 tablet    Refills: 0    Start: 8/15/2024 - 9/14/2024    Earliest Fill Date: 8/15/2024    Class: Normal    For: Chronic bilateral low back pain without sciatica    To pharmacy: Medically necessary for > 7 days due to chronic pain.    Last ordered: 4 weeks ago (7/17/2024) by Shobha Perez MD    Last dispensed: 7/21/2024    Rx #: [id number]    Opiods Refill Protocol Rxmgio11/15/2024 08:23 AM    Recent visit with authorizing provider in the past 3 months    Patient has signed pain contract    Patient not currently pregnant    No positive pregnancy test in past 12 months      To be filled at: [facility]

## 2024-08-19 ENCOUNTER — ON-DEMAND VIRTUAL (OUTPATIENT)
Dept: URGENT CARE | Facility: CLINIC | Age: 34
End: 2024-08-19
Payer: MEDICARE

## 2024-08-19 DIAGNOSIS — R30.0 DYSURIA: Primary | ICD-10-CM

## 2024-08-19 PROCEDURE — 99213 OFFICE O/P EST LOW 20 MIN: CPT | Mod: 95,,, | Performed by: NURSE PRACTITIONER

## 2024-08-19 RX ORDER — NITROFURANTOIN 25; 75 MG/1; MG/1
100 CAPSULE ORAL EVERY 12 HOURS
Qty: 14 CAPSULE | Refills: 0 | Status: SHIPPED | OUTPATIENT
Start: 2024-08-19 | End: 2024-08-26

## 2024-08-19 NOTE — PROGRESS NOTES
Subjective:      Patient ID: Kendra Russell is a 34 y.o. female.    Vitals:  vitals were not taken for this visit.     Chief Complaint: Urinary Tract Infection      Visit Type: TELE AUDIOVISUAL    Present with the patient at the time of consultation: TELEMED PRESENT WITH PATIENT: None, at home    Past Medical History:   Diagnosis Date    FHx: bowel obstruction     HA (headache)     Interstitial cystitis     Ovarian cyst     Ulcer      Past Surgical History:   Procedure Laterality Date    BACK SURGERY  2010    had the scar from this surgery fixed recently    CHOLECYSTECTOMY N/A 05/2015    COLON SURGERY  2012    bowel obstruction    MINI-LAPAROTOMY      NOSE SURGERY      RHINOPLASTY TIP      tracheal polyp removed      UPPER GASTROINTESTINAL ENDOSCOPY  09/2017     Review of patient's allergies indicates:   Allergen Reactions    Bismuth subcit [id number] Nausea And Vomiting, Other (See Comments) and Shortness Of Breath     Pt reports dizziness with Pylera    Other Reaction(s): Not available, Other    Pt reports dizziness with Pylera   Pt reports dizziness with Pylera    Ciprofloxacin Rash     Other Reaction(s): Other (See Comments)    Muscle and joint pain (severe).  Very painful x 1 month    Sulfamethoxazole-trimethoprim Rash     Fever, joint muscle pain-was hospilized    Other Reaction(s): Other (See Comments)    muscle and joint pains for few weeks    6-iodomethylnorcholesterol i-131 Other (See Comments)    Adhesive tape-silicones     Ciprofloxacin hcl     Escitalopram     Flagyl [metronidazole hcl]     Hydrocodone-acetaminophen      Bad migraines; dizzy, nausea    Ketamine     Metoclopramide      Other reaction(s): Rash, Unspecified    Peanut     Scopolamine      Other reaction(s): Visual alteration (finding)    Toradol [ketorolac]     Zofran [ondansetron hcl (pf)] Other (See Comments)     Pt reports migraine with Zofran    Adhesive Rash     Plastic tape    Compazine [prochlorperazine] Rash    Fosfomycin  tromethamine Rash    Gabapentin Nausea And Vomiting     Other Reaction(s): Not available    Meperidine Nausea And Vomiting     Other Reaction(s): Not available    Moxifloxacin Nausea And Vomiting     Other Reaction(s): Not available    Tramadol Nausea And Vomiting     Other Reaction(s): Not available     Current Outpatient Medications on File Prior to Visit   Medication Sig Dispense Refill    cyanocobalamin, vitamin B-12, (VITAMIN B-12) 5,000 mcg/mL Drop Take by mouth once daily.       esomeprazole (NEXIUM) 40 MG capsule Take 1 tablet by mouth once daily 30 capsule 2    etonogestreL-ethinyl estradioL (NUVARING) 0.12-0.015 mg/24 hr vaginal ring Place 1 each vaginally every 28 days. 3 each 2    [START ON 8/20/2024] oxyCODONE-acetaminophen (PERCOCET)  mg per tablet Take 1 tablet by mouth every 4 (four) hours as needed for Pain. 180 tablet 0    promethazine (PHENERGAN) 25 MG tablet Take 1 tablet (25 mg total) by mouth every 6 (six) hours as needed for Nausea. 20 tablet 0     No current facility-administered medications on file prior to visit.     Family History   Problem Relation Name Age of Onset    Diabetes Mother      Stroke Mother          53 yo    Diabetes Father      Hypertension Father      GI problems Father      Hyperlipidemia Father      Diabetes Maternal Aunt      Diabetes Maternal Uncle      Diabetes Maternal Grandmother      Diabetes Maternal Grandfather      Cancer Paternal Grandmother          bladder    Bladder Cancer Paternal Grandmother      Colon cancer Paternal Grandfather      Breast cancer Neg Hx      Heart disease Neg Hx      Ovarian cancer Neg Hx      Cervical cancer Neg Hx      Vaginal cancer Neg Hx      Endometrial cancer Neg Hx         Medications Ordered                [id number] [place] - [address]   [address] [place] [address]    Telephone: [phone]   Fax: [phone]   Hours: Not open 24 hours                         E-Prescribed (1 of 1)               nitrofurantoin, macrocrystal-monohydrate, (MACROBID) 100 MG capsule    Sig: Take 1 capsule (100 mg total) by mouth every 12 (twelve) hours. for 7 days       Start: 8/19/24     Quantity: 14 capsule Refills: 0                           Ohs Peq Odvv Intake    8/19/2024 11:20 AM CDT - Filed by Patient   What is your current physical address in the event of a medical emergency? [address] [place], [address]   Are you able to take your vital signs? No   Please attach any relevant images or files          UTI symptoms for 5 days. Reports frequency, urgency and dysuria. + cloudy, malodorous urine. No hematuria. No f/c/n/v. No vaginal discharge. No severe back, flank or pelvic pain. Took pyridium with some relief. Has Urology f/u on Friday.       Urinary Tract Infection   Associated symptoms include frequency and urgency. Pertinent negatives include no chills, flank pain, hematuria, nausea or vomiting.       Constitution: Negative for chills and fever.   Gastrointestinal:  Negative for abdominal pain, nausea and vomiting.   Genitourinary:  Positive for dysuria, frequency and urgency. Negative for flank pain, hematuria, vaginal discharge, vaginal odor and pelvic pain.   Musculoskeletal:  Negative for back pain.        Objective:   The physical exam was conducted virtually.  Physical Exam   Constitutional: She is oriented to person, place, and time. She does not appear ill. No distress.   HENT:   Head: Normocephalic and atraumatic.   Nose: Nose normal.   Eyes: Extraocular movement intact   Pulmonary/Chest: Effort normal.   Abdominal: Normal appearance.   Musculoskeletal: Normal range of motion.         General: Normal range of motion.   Neurological: no focal deficit. She is alert and oriented to person, place, and time.   Psychiatric: Her behavior is normal. Mood normal.   Vitals reviewed.      Assessment:     1. Dysuria        Plan:   Patient encouraged to monitor symptoms closely and instructed to follow-up for  new or worsening symptoms. Further, in-person, evaluation may be necessary for continued treatment. Please follow up with your primary care doctor or specialist as needed. Verbally discussed plan. Patient confirms understanding and is in agreement with treatment and plan.     You must understand that you've received a [facility] evaluation only and that you may be released before all your medical problems are known or treated. You, the patient, will arrange for follow up care as instructed.      Dysuria  -     nitrofurantoin, macrocrystal-monohydrate, (MACROBID) 100 MG capsule; Take 1 capsule (100 mg total) by mouth every 12 (twelve) hours. for 7 days  Dispense: 14 capsule; Refill: 0      Patient Instructions   Patient Education       Urinary Tract Infection Discharge Instructions, Adult   About this topic   A urinary tract infection is a UTI. It is caused by germs getting into the urinary tract. The urinary tract is made up of the kidneys, ureters, bladder, and urethra. The urethra is a tube at the bottom of the bladder. Urine flows out of this tube. The germs enter the urethra and then spread in the bladder. The ureters are small tubes that join the bladder and the kidneys. Most UTIs are infections in either your bladder or your kidneys. Bladder infections are more common and may also be called cystitis. Kidney infections are more serious and may also be called pyelonephritis.         What care is needed at home?   Ask your doctor what you need to do when you go home. Make sure you ask questions if you do not understand what the doctor says. This way you will know what you need to do.  Take your drugs as ordered by your doctor.  Unless your doctor has told you otherwise, drink at least 8 to 10 glasses of water or water-based drinks each day. Do not include drinks with caffeine, like coffee or tea.  Do not hold back your urine. Go to the bathroom every 2 to 3 hours.  What follow-up care is needed?   Your doctor  may ask you to make visits to the office to check on your progress. Be sure to keep these visits.  What drugs may be needed?   The doctor may order drugs to:  Fight an infection  Help with pain  Numb your bladder  Help you pass your urine more easily  Be sure to talk to your doctor about all of your drugs if you are pregnant.  Will physical activity be limited?   Physical activities will not be limited. You may have to pass urine more often.  What changes to diet are needed?   Do not drink beer, wine, and mixed drinks (alcohol) or caffeine. These can bother the bladder.  Talk to your doctor about drinking cranberry juice.  What can be done to prevent this health problem?   Gently cleanse your genital area each day. Wipe from front to back to keep germs from going in your body.  If your penis is uncircumcised, retract the foreskin and gently clean around the head of the penis each day.  Consider other methods of birth control instead of a spermicide.  Empty your bladder after having sex.  Empty your bladder before going to sleep.  When do I need to call the doctor?   Signs of infection. These include a fever of 100.4°F (38°C) or higher, chills, back pain, nausea, throwing up, or bloody urine.  Signs come back after treatment ends  You notice more blood in your urine.  Your signs get worse or do not improve within 24 hours of starting treatment.  You are not able to urinate for more than 8 hours.  Your signs come back after treatment has stopped.  Teach Back: Helping You Understand   The Teach Back Method helps you understand the information we are giving you. After you talk with the staff, tell them in your own words what you learned. This helps to make sure the staff has described each thing clearly. It also helps to explain things that may have been confusing. Before going home, make sure you can do these things:  I can tell you about my condition.  I can tell you how to prevent this problem from coming back.  I  can tell you what I will do if my signs do not get better after 24 hours of treatment or come back after I have finished treatment.  Where can I learn more?   American Academy of Family Physicians  https://familydoctor.org/condition/urinary-tract-infections/   NHS Choices  https://www.nhs.uk/conditions/urinary-tract-infections-utis/   Last Reviewed Date   2021-06-03  Consumer Information Use and Disclaimer   This information is not specific medical advice and does not replace information you receive from your health care provider. This is only a brief summary of general information. It does NOT include all information about conditions, illnesses, injuries, tests, procedures, treatments, therapies, discharge instructions or life-style choices that may apply to you. You must talk with your health care provider for complete information about your health and treatment options. This information should not be used to decide whether or not to accept your health care providers advice, instructions or recommendations. Only your health care provider has the knowledge and training to provide advice that is right for you.  Copyright   Copyright © 2021 [employer] and its affiliates and/or licensors. All rights reserved.

## 2024-08-19 NOTE — PATIENT INSTRUCTIONS
Patient Education       Urinary Tract Infection Discharge Instructions, Adult   About this topic   A urinary tract infection is a UTI. It is caused by germs getting into the urinary tract. The urinary tract is made up of the kidneys, ureters, bladder, and urethra. The urethra is a tube at the bottom of the bladder. Urine flows out of this tube. The germs enter the urethra and then spread in the bladder. The ureters are small tubes that join the bladder and the kidneys. Most UTIs are infections in either your bladder or your kidneys. Bladder infections are more common and may also be called cystitis. Kidney infections are more serious and may also be called pyelonephritis.         What care is needed at home?   Ask your doctor what you need to do when you go home. Make sure you ask questions if you do not understand what the doctor says. This way you will know what you need to do.  Take your drugs as ordered by your doctor.  Unless your doctor has told you otherwise, drink at least 8 to 10 glasses of water or water-based drinks each day. Do not include drinks with caffeine, like coffee or tea.  Do not hold back your urine. Go to the bathroom every 2 to 3 hours.  What follow-up care is needed?   Your doctor may ask you to make visits to the office to check on your progress. Be sure to keep these visits.  What drugs may be needed?   The doctor may order drugs to:  Fight an infection  Help with pain  Numb your bladder  Help you pass your urine more easily  Be sure to talk to your doctor about all of your drugs if you are pregnant.  Will physical activity be limited?   Physical activities will not be limited. You may have to pass urine more often.  What changes to diet are needed?   Do not drink beer, wine, and mixed drinks (alcohol) or caffeine. These can bother the bladder.  Talk to your doctor about drinking cranberry juice.  What can be done to prevent this health problem?   Gently cleanse your genital area each day.  Wipe from front to back to keep germs from going in your body.  If your penis is uncircumcised, retract the foreskin and gently clean around the head of the penis each day.  Consider other methods of birth control instead of a spermicide.  Empty your bladder after having sex.  Empty your bladder before going to sleep.  When do I need to call the doctor?   Signs of infection. These include a fever of 100.4°F (38°C) or higher, chills, back pain, nausea, throwing up, or bloody urine.  Signs come back after treatment ends  You notice more blood in your urine.  Your signs get worse or do not improve within 24 hours of starting treatment.  You are not able to urinate for more than 8 hours.  Your signs come back after treatment has stopped.  Teach Back: Helping You Understand   The Teach Back Method helps you understand the information we are giving you. After you talk with the staff, tell them in your own words what you learned. This helps to make sure the staff has described each thing clearly. It also helps to explain things that may have been confusing. Before going home, make sure you can do these things:  I can tell you about my condition.  I can tell you how to prevent this problem from coming back.  I can tell you what I will do if my signs do not get better after 24 hours of treatment or come back after I have finished treatment.  Where can I learn more?   American Academy of Family Physicians  https://familydoctor.org/condition/urinary-tract-infections/   NHS Choices  https://www.nhs.uk/conditions/urinary-tract-infections-utis/   Last Reviewed Date   2021-06-03  Consumer Information Use and Disclaimer   This information is not specific medical advice and does not replace information you receive from your health care provider. This is only a brief summary of general information. It does NOT include all information about conditions, illnesses, injuries, tests, procedures, treatments, therapies, discharge  instructions or life-style choices that may apply to you. You must talk with your health care provider for complete information about your health and treatment options. This information should not be used to decide whether or not to accept your health care providers advice, instructions or recommendations. Only your health care provider has the knowledge and training to provide advice that is right for you.  Copyright   Copyright © 2021 [employer] and its affiliates and/or licensors. All rights reserved.

## 2024-09-05 ENCOUNTER — TELEPHONE (OUTPATIENT)
Dept: PHYSICAL MEDICINE AND REHAB | Facility: CLINIC | Age: 34
End: 2024-09-05
Payer: MEDICARE

## 2024-09-05 NOTE — TELEPHONE ENCOUNTER
----- Message from Summer Contreras sent at 9/5/2024  8:06 AM CDT -----  .Type: Appointment Request     Caller is requesting appointment    No Solution Found When Scheduling: PLEASE REACH OUT TO ON SCHEDULING A VIRTUAL VISIT. I HAD ONE FOR TODAY BUT PT DECLINED     Best Call Back Number: [phone]    Additional Information: THANK YOU

## 2024-09-06 ENCOUNTER — ON-DEMAND VIRTUAL (OUTPATIENT)
Dept: URGENT CARE | Facility: CLINIC | Age: 34
End: 2024-09-06
Payer: MEDICARE

## 2024-09-06 DIAGNOSIS — R39.9 UTI SYMPTOMS: Primary | ICD-10-CM

## 2024-09-06 RX ORDER — NITROFURANTOIN 25; 75 MG/1; MG/1
100 CAPSULE ORAL 2 TIMES DAILY
Qty: 10 CAPSULE | Refills: 0 | Status: SHIPPED | OUTPATIENT
Start: 2024-09-06 | End: 2024-09-11

## 2024-09-07 NOTE — PROGRESS NOTES
Subjective:      Patient ID: Kendra Russell is a 34 y.o. female.    Vitals:  vitals were not taken for this visit.     Chief Complaint: Dysuria      Visit Type: TELE AUDIOVISUAL    Present with the patient at the time of consultation: TELEMED PRESENT WITH PATIENT: None        Past Medical History:   Diagnosis Date    FHx: bowel obstruction     HA (headache)     Interstitial cystitis     Ovarian cyst     Ulcer      Past Surgical History:   Procedure Laterality Date    BACK SURGERY  2010    had the scar from this surgery fixed recently    CHOLECYSTECTOMY N/A 05/2015    COLON SURGERY  2012    bowel obstruction    MINI-LAPAROTOMY      NOSE SURGERY      RHINOPLASTY TIP      tracheal polyp removed      UPPER GASTROINTESTINAL ENDOSCOPY  09/2017     Review of patient's allergies indicates:   Allergen Reactions    Bismuth subcit [id number] Nausea And Vomiting, Other (See Comments) and Shortness Of Breath     Pt reports dizziness with Pylera    Other Reaction(s): Not available, Other    Pt reports dizziness with Pylera   Pt reports dizziness with Pylera    Ciprofloxacin Rash     Other Reaction(s): Other (See Comments)    Muscle and joint pain (severe).  Very painful x 1 month    Sulfamethoxazole-trimethoprim Rash     Fever, joint muscle pain-was hospilized    Other Reaction(s): Other (See Comments)    muscle and joint pains for few weeks    6-iodomethylnorcholesterol i-131 Other (See Comments)    Adhesive tape-silicones     Ciprofloxacin hcl     Escitalopram     Flagyl [metronidazole hcl]     Hydrocodone-acetaminophen      Bad migraines; dizzy, nausea    Ketamine     Metoclopramide      Other reaction(s): Rash, Unspecified    Peanut     Scopolamine      Other reaction(s): Visual alteration (finding)    Toradol [ketorolac]     Zofran [ondansetron hcl (pf)] Other (See Comments)     Pt reports migraine with Zofran    Adhesive Rash     Plastic tape    Compazine [prochlorperazine] Rash     Fosfomycin tromethamine Rash    Gabapentin Nausea And Vomiting     Other Reaction(s): Not available    Meperidine Nausea And Vomiting     Other Reaction(s): Not available    Moxifloxacin Nausea And Vomiting     Other Reaction(s): Not available    Tramadol Nausea And Vomiting     Other Reaction(s): Not available     Current Outpatient Medications on File Prior to Visit   Medication Sig Dispense Refill    cyanocobalamin, vitamin B-12, (VITAMIN B-12) 5,000 mcg/mL Drop Take by mouth once daily.       esomeprazole (NEXIUM) 40 MG capsule Take 1 tablet by mouth once daily 30 capsule 2    etonogestreL-ethinyl estradioL (NUVARING) 0.12-0.015 mg/24 hr vaginal ring Place 1 each vaginally every 28 days. 3 each 2    oxyCODONE-acetaminophen (PERCOCET)  mg per tablet Take 1 tablet by mouth every 4 (four) hours as needed for Pain. 180 tablet 0    promethazine (PHENERGAN) 25 MG tablet Take 1 tablet (25 mg total) by mouth every 6 (six) hours as needed for Nausea. 20 tablet 0     No current facility-administered medications on file prior to visit.     Family History   Problem Relation Name Age of Onset    Diabetes Mother      Stroke Mother          51 yo    Diabetes Father      Hypertension Father      GI problems Father      Hyperlipidemia Father      Diabetes Maternal Aunt      Diabetes Maternal Uncle      Diabetes Maternal Grandmother      Diabetes Maternal Grandfather      Cancer Paternal Grandmother          bladder    Bladder Cancer Paternal Grandmother      Colon cancer Paternal Grandfather      Breast cancer Neg Hx      Heart disease Neg Hx      Ovarian cancer Neg Hx      Cervical cancer Neg Hx      Vaginal cancer Neg Hx      Endometrial cancer Neg Hx         Medications Ordered                [id number] [place] - [address]   [address] [place] [address]    Telephone: [phone]   Fax: [phone]   Hours: Not open 24 hours                          E-Prescribed (1 of 1)              nitrofurantoin, macrocrystal-monohydrate, (MACROBID) 100 MG capsule    Sig: Take 1 capsule (100 mg total) by mouth 2 (two) times daily. for 5 days       Start: 9/6/24     Quantity: 10 capsule Refills: 0                           Ohs Peq Odvv Intake    9/6/2024  7:14 PM CDT - Filed by Patient   What is your current physical [address] in the event of a medical emergency? [address] [place], [address]   Are you able to take your vital signs? No   Please attach any relevant images or files          33 yo female with c/o odor , pain on urination, dark urination for one week. She states no abdominal pain. She states low grade fever. She denies hematuria. She denies pregnancy and std.       Constitution: Negative. Negative for fever.   HENT: Negative.     Neck: neck negative.   Cardiovascular: Negative.    Respiratory: Negative.     Gastrointestinal: Negative.  Negative for abdominal pain, nausea and vomiting.   Endocrine: negative.   Genitourinary:  Positive for dysuria, frequency and urgency. Negative for vaginal discharge, vaginal odor and genital sore.   Musculoskeletal: Negative.  Negative for back pain.   Skin: Negative.    Neurological: Negative.         Objective:   The physical exam was conducted virtually.  LOCATION OF PATIENT home  Physical Exam   Constitutional: She is oriented to person, place, and time. She appears well-developed.   HENT:   Head: Normocephalic and atraumatic.   Ears:   Right Ear: Hearing, tympanic membrane and external ear normal.   Left Ear: Hearing, tympanic membrane and external ear normal.   Nose: Nose normal.   Mouth/Throat: Uvula is midline, oropharynx is clear and moist and mucous membranes are normal.   Eyes: Conjunctivae and EOM are normal. Pupils are equal, round, and reactive to light.   Neck: Neck supple.   Cardiovascular: Normal rate.   Pulmonary/Chest: Effort normal and breath sounds normal.   Musculoskeletal: Normal range of motion.          General: Normal range of motion.   Neurological: She is alert and oriented to person, place, and time.   Skin: Skin is warm.   Psychiatric: Her behavior is normal. Thought content normal.   Nursing note and vitals reviewed.      Assessment:     1. UTI symptoms        Plan:       UTI symptoms  -     nitrofurantoin, macrocrystal-monohydrate, (MACROBID) 100 MG capsule; Take 1 capsule (100 mg total) by mouth 2 (two) times daily. for 5 days  Dispense: 10 capsule; Refill: 0

## 2024-09-10 ENCOUNTER — TELEPHONE (OUTPATIENT)
Dept: UROLOGY | Facility: CLINIC | Age: 34
End: 2024-09-10
Payer: MEDICARE

## 2024-09-10 ENCOUNTER — ON-DEMAND VIRTUAL (OUTPATIENT)
Dept: URGENT CARE | Facility: CLINIC | Age: 34
End: 2024-09-10
Payer: MEDICARE

## 2024-09-10 DIAGNOSIS — R39.9 UTI SYMPTOMS: Primary | ICD-10-CM

## 2024-09-10 PROCEDURE — 99213 OFFICE O/P EST LOW 20 MIN: CPT | Mod: 95,,, | Performed by: NURSE PRACTITIONER

## 2024-09-10 RX ORDER — NITROFURANTOIN 25; 75 MG/1; MG/1
100 CAPSULE ORAL 2 TIMES DAILY
Qty: 10 CAPSULE | Refills: 0 | Status: SHIPPED | OUTPATIENT
Start: 2024-09-10 | End: 2024-09-15

## 2024-09-10 NOTE — TELEPHONE ENCOUNTER
Pt called for UTI medication. I voiced that he's not able to prescribe medication due to she have not seen . Pt is aware.

## 2024-09-10 NOTE — PROGRESS NOTES
Subjective:      Patient ID: Kendra Russell is a 34 y.o. female.    Vitals:  vitals were not taken for this visit.     Chief Complaint: Dysuria      Visit Type: TELE AUDIOVISUAL    Present with the patient at the time of consultation: TELEMED PRESENT WITH PATIENT: None        Past Medical History:   Diagnosis Date    FHx: bowel obstruction     HA (headache)     Interstitial cystitis     Ovarian cyst     Ulcer      Past Surgical History:   Procedure Laterality Date    BACK SURGERY  2010    had the scar from this surgery fixed recently    CHOLECYSTECTOMY N/A 05/2015    COLON SURGERY  2012    bowel obstruction    MINI-LAPAROTOMY      NOSE SURGERY      RHINOPLASTY TIP      tracheal polyp removed      UPPER GASTROINTESTINAL ENDOSCOPY  09/2017     Review of patient's allergies indicates:   Allergen Reactions    Bismuth subcit [id number] Nausea And Vomiting, Other (See Comments) and Shortness Of Breath     Pt reports dizziness with Pylera    Other Reaction(s): Not available, Other    Pt reports dizziness with Pylera   Pt reports dizziness with Pylera    Ciprofloxacin Rash     Other Reaction(s): Other (See Comments)    Muscle and joint pain (severe).  Very painful x 1 month    Sulfamethoxazole-trimethoprim Rash     Fever, joint muscle pain-was hospilized    Other Reaction(s): Other (See Comments)    muscle and joint pains for few weeks    6-iodomethylnorcholesterol i-131 Other (See Comments)    Adhesive tape-silicones     Ciprofloxacin hcl     Escitalopram     Flagyl [metronidazole hcl]     Hydrocodone-acetaminophen      Bad migraines; dizzy, nausea    Ketamine     Metoclopramide      Other reaction(s): Rash, Unspecified    Peanut     Scopolamine      Other reaction(s): Visual alteration (finding)    Toradol [ketorolac]     Zofran [ondansetron hcl (pf)] Other (See Comments)     Pt reports migraine with Zofran    Adhesive Rash     Plastic tape    Compazine [prochlorperazine] Rash    Fosfomycin tromethamine Rash     Gabapentin Nausea And Vomiting     Other Reaction(s): Not available    Meperidine Nausea And Vomiting     Other Reaction(s): Not available    Moxifloxacin Nausea And Vomiting     Other Reaction(s): Not available    Tramadol Nausea And Vomiting     Other Reaction(s): Not available     Current Outpatient Medications on File Prior to Visit   Medication Sig Dispense Refill    cyanocobalamin, vitamin B-12, (VITAMIN B-12) 5,000 mcg/mL Drop Take by mouth once daily.       esomeprazole (NEXIUM) 40 MG capsule Take 1 tablet by mouth once daily 30 capsule 2    etonogestreL-ethinyl estradioL (NUVARING) 0.12-0.015 mg/24 hr vaginal ring Place 1 each vaginally every 28 days. 3 each 2    nitrofurantoin, macrocrystal-monohydrate, (MACROBID) 100 MG capsule Take 1 capsule (100 mg total) by mouth 2 (two) times daily. for 5 days 10 capsule 0    oxyCODONE-acetaminophen (PERCOCET)  mg per tablet Take 1 tablet by mouth every 4 (four) hours as needed for Pain. 180 tablet 0    promethazine (PHENERGAN) 25 MG tablet Take 1 tablet (25 mg total) by mouth every 6 (six) hours as needed for Nausea. 20 tablet 0     No current facility-administered medications on file prior to visit.     Family History   Problem Relation Name Age of Onset    Diabetes Mother      Stroke Mother          51 yo    Diabetes Father      Hypertension Father      GI problems Father      Hyperlipidemia Father      Diabetes Maternal Aunt      Diabetes Maternal Uncle      Diabetes Maternal Grandmother      Diabetes Maternal Grandfather      Cancer Paternal Grandmother          bladder    Bladder Cancer Paternal Grandmother      Colon cancer Paternal Grandfather      Breast cancer Neg Hx      Heart disease Neg Hx      Ovarian cancer Neg Hx      Cervical cancer Neg Hx      Vaginal cancer Neg Hx      Endometrial cancer Neg Hx         Medications Ordered                [facility] [place] - [address]   [address] [place] [address]     Telephone: [phone]   Fax: [phone]   Hours: Not open 24 hours                         E-Prescribed (1 of 1)              nitrofurantoin, macrocrystal-monohydrate, (MACROBID) 100 MG capsule    Sig: Take 1 capsule (100 mg total) by mouth 2 (two) times daily. for 5 days       Start: 9/10/24     Quantity: 10 capsule Refills: 0                           Ohs Peq Odvv Intake    9/10/2024  3:48 PM CDT - Filed by Patient   What is your current physical address in the event of a medical emergency? [address] [place], [address]   Are you able to take your vital signs? No   Please attach any relevant images or files          35 yo female with c/o pain on urination, low grade fever and cloudy urine with odor. She states symptoms for two weeks. She states she has tried pyridium. She denies hematuria. She denies abdominal pain and back pain. Denies discharge. She denies std and pregnancy.         Constitution: Negative. Negative for fever.   HENT: Negative.     Neck: neck negative.   Cardiovascular: Negative.    Respiratory: Negative.     Gastrointestinal: Negative.  Negative for abdominal pain, nausea and vomiting.   Endocrine: negative.   Genitourinary:  Positive for dysuria, frequency and urgency. Negative for vaginal discharge, vaginal odor and genital sore.   Musculoskeletal: Negative.  Negative for back pain.   Skin: Negative.    Neurological: Negative.         Objective:   The physical exam was conducted virtually.  LOCATION OF PATIENT home  Physical Exam   Constitutional: She is oriented to person, place, and time. She appears well-developed.   HENT:   Head: Normocephalic and atraumatic.   Ears:   Right Ear: Hearing, tympanic membrane and external ear normal.   Left Ear: Hearing, tympanic membrane and external ear normal.   Nose: Nose normal.   Mouth/Throat: Uvula is midline, oropharynx is clear and moist and mucous membranes are normal.   Eyes: Conjunctivae and EOM are normal. Pupils are equal, round, and  reactive to light.   Neck: Neck supple.   Cardiovascular: Normal rate.   Pulmonary/Chest: Effort normal and breath sounds normal.   Musculoskeletal: Normal range of motion.         General: Normal range of motion.   Neurological: She is alert and oriented to person, place, and time.   Skin: Skin is warm.   Psychiatric: Her behavior is normal. Thought content normal.   Nursing note and vitals reviewed.      Assessment:     1. UTI symptoms        Plan:   Chart review  I did a virtual visit with this patient on 9/6. She states symptoms improved but starting to come back. I explained to patient that this could be resistence and she stated she has cystitis and it takes more than 5 days to get rid of symptoms. She did not want to go to urgent care because of hurricaine coming.   I will give her 5 more days but if symptoms persist she will need a in person visit for evaluation as she has had 3 virtual visits for uti symptoms and no in person visit.      UTI symptoms  -     nitrofurantoin, macrocrystal-monohydrate, (MACROBID) 100 MG capsule; Take 1 capsule (100 mg total) by mouth 2 (two) times daily. for 5 days  Dispense: 10 capsule; Refill: 0

## 2024-09-10 NOTE — TELEPHONE ENCOUNTER
----- Message from Kelly Hughes sent at 9/10/2024  1:52 PM CDT -----  Type:  Needs Medical Advice    Who Called: pt  Would the patient rather a call back or a response via [facility]? call  Best Call Back Number:  [phone]  Additional Information: pt would like office to know she is having uti symptoms needing some medication now before storm since she can't come in person

## 2024-09-15 DIAGNOSIS — M54.50 CHRONIC BILATERAL LOW BACK PAIN WITHOUT SCIATICA: ICD-10-CM

## 2024-09-15 DIAGNOSIS — G89.29 CHRONIC BILATERAL LOW BACK PAIN WITHOUT SCIATICA: ICD-10-CM

## 2024-09-16 RX ORDER — OXYCODONE AND ACETAMINOPHEN 10; 325 MG/1; MG/1
1 TABLET ORAL EVERY 4 HOURS PRN
Qty: 180 TABLET | Refills: 0 | Status: SHIPPED | OUTPATIENT
Start: 2024-09-18 | End: 2024-10-18

## 2024-10-14 DIAGNOSIS — G89.29 CHRONIC BILATERAL LOW BACK PAIN WITHOUT SCIATICA: ICD-10-CM

## 2024-10-14 DIAGNOSIS — M54.50 CHRONIC BILATERAL LOW BACK PAIN WITHOUT SCIATICA: ICD-10-CM

## 2024-10-14 RX ORDER — OXYCODONE AND ACETAMINOPHEN 10; 325 MG/1; MG/1
1 TABLET ORAL EVERY 4 HOURS PRN
Qty: 180 TABLET | Refills: 0 | Status: SHIPPED | OUTPATIENT
Start: 2024-10-19 | End: 2024-11-18

## 2024-11-14 DIAGNOSIS — G89.29 CHRONIC BILATERAL LOW BACK PAIN WITHOUT SCIATICA: ICD-10-CM

## 2024-11-14 DIAGNOSIS — M54.50 CHRONIC BILATERAL LOW BACK PAIN WITHOUT SCIATICA: ICD-10-CM

## 2024-11-14 RX ORDER — OXYCODONE AND ACETAMINOPHEN 10; 325 MG/1; MG/1
1 TABLET ORAL EVERY 4 HOURS PRN
Qty: 180 TABLET | Refills: 0 | Status: SHIPPED | OUTPATIENT
Start: 2024-11-18 | End: 2024-12-18

## 2024-12-14 DIAGNOSIS — G89.29 CHRONIC BILATERAL LOW BACK PAIN WITHOUT SCIATICA: ICD-10-CM

## 2024-12-14 DIAGNOSIS — M54.50 CHRONIC BILATERAL LOW BACK PAIN WITHOUT SCIATICA: ICD-10-CM

## 2024-12-14 RX ORDER — OXYCODONE AND ACETAMINOPHEN 10; 325 MG/1; MG/1
1 TABLET ORAL EVERY 4 HOURS PRN
Qty: 180 TABLET | Refills: 0 | Status: CANCELLED | OUTPATIENT
Start: 2024-12-14 | End: 2025-01-13

## 2024-12-15 DIAGNOSIS — M54.50 CHRONIC BILATERAL LOW BACK PAIN WITHOUT SCIATICA: ICD-10-CM

## 2024-12-15 DIAGNOSIS — G89.29 CHRONIC BILATERAL LOW BACK PAIN WITHOUT SCIATICA: ICD-10-CM

## 2024-12-15 RX ORDER — OXYCODONE AND ACETAMINOPHEN 10; 325 MG/1; MG/1
1 TABLET ORAL EVERY 4 HOURS PRN
Qty: 180 TABLET | Refills: 0 | Status: CANCELLED | OUTPATIENT
Start: 2024-12-14 | End: 2025-01-13

## 2024-12-16 RX ORDER — OXYCODONE AND ACETAMINOPHEN 10; 325 MG/1; MG/1
1 TABLET ORAL EVERY 4 HOURS PRN
Qty: 180 TABLET | Refills: 0 | Status: SHIPPED | OUTPATIENT
Start: 2024-12-18 | End: 2025-01-17

## 2025-01-04 ENCOUNTER — ON-DEMAND VIRTUAL (OUTPATIENT)
Dept: URGENT CARE | Facility: CLINIC | Age: 35
End: 2025-01-04
Payer: MEDICARE

## 2025-01-04 DIAGNOSIS — N30.00 ACUTE CYSTITIS WITHOUT HEMATURIA: Primary | ICD-10-CM

## 2025-01-04 PROCEDURE — 98005 SYNCH AUDIO-VIDEO EST LOW 20: CPT | Mod: 95,,, | Performed by: NURSE PRACTITIONER

## 2025-01-04 RX ORDER — FLUCONAZOLE 150 MG/1
150 TABLET ORAL
Qty: 2 TABLET | Refills: 0 | Status: SHIPPED | OUTPATIENT
Start: 2025-01-04

## 2025-01-04 RX ORDER — NITROFURANTOIN 25; 75 MG/1; MG/1
100 CAPSULE ORAL 2 TIMES DAILY
Qty: 10 CAPSULE | Refills: 0 | Status: SHIPPED | OUTPATIENT
Start: 2025-01-04 | End: 2025-01-09

## 2025-01-04 NOTE — PATIENT INSTRUCTIONS
Increase fluids and rest,  Continue medications as prescribed  Take all antibiotics as directed  Wipe from front to back, limit bubble bath, limit use of thong under ware, void after intercourse  F/u with PCP in 2-3 days,   Go to Urgent Care or ER if symptoms worsening or not improved.

## 2025-01-04 NOTE — PROGRESS NOTES
Subjective:      Patient ID: Kendra Russell is a 34 y.o. female.    Vitals:  vitals were not taken for this visit.     Chief Complaint: Urinary Tract Infection (X1 week)      Visit Type: TELE AUDIOVISUAL    Present with the patient at the time of consultation: TELEMED PRESENT WITH PATIENT: None Nathalie castellanos     Past Medical History:   Diagnosis Date    FHx: bowel obstruction     HA (headache)     Interstitial cystitis     Ovarian cyst     Ulcer      Past Surgical History:   Procedure Laterality Date    BACK SURGERY  2010    had the scar from this surgery fixed recently    CHOLECYSTECTOMY N/A 05/2015    COLON SURGERY  2012    bowel obstruction    MINI-LAPAROTOMY      NOSE SURGERY      RHINOPLASTY TIP      tracheal polyp removed      UPPER GASTROINTESTINAL ENDOSCOPY  09/2017     Review of patient's allergies indicates:   Allergen Reactions    Bismuth subcit [id number] Nausea And Vomiting, Other (See Comments) and Shortness Of Breath     Pt reports dizziness with Pylera    Other Reaction(s): Not available, Other    Pt reports dizziness with Pylera   Pt reports dizziness with Pylera    Ciprofloxacin Rash     Other Reaction(s): Other (See Comments)    Muscle and joint pain (severe).  Very painful x 1 month    Sulfamethoxazole-trimethoprim Rash     Fever, joint muscle pain-was hospilized    Other Reaction(s): Other (See Comments)    muscle and joint pains for few weeks    6-iodomethylnorcholesterol i-131 Other (See Comments)    Adhesive tape-silicones     Ciprofloxacin hcl     Escitalopram     Flagyl [metronidazole hcl]     Hydrocodone-acetaminophen      Bad migraines; dizzy, nausea    Ketamine     Metoclopramide      Other reaction(s): Rash, Unspecified    Peanut     Scopolamine      Other reaction(s): Visual alteration (finding)    Toradol [ketorolac]     Zofran [ondansetron hcl (pf)] Other (See Comments)     Pt reports migraine with Zofran    Adhesive Rash     Plastic tape    Compazine [prochlorperazine] Rash     Fosfomycin tromethamine Rash    Gabapentin Nausea And Vomiting     Other Reaction(s): Not available    Meperidine Nausea And Vomiting     Other Reaction(s): Not available    Moxifloxacin Nausea And Vomiting     Other Reaction(s): Not available    Tramadol Nausea And Vomiting     Other Reaction(s): Not available     Current Outpatient Medications on File Prior to Visit   Medication Sig Dispense Refill    cyanocobalamin, vitamin B-12, (VITAMIN B-12) 5,000 mcg/mL Drop Take by mouth once daily.       esomeprazole (NEXIUM) 40 MG capsule Take 1 tablet by mouth once daily 30 capsule 2    esomeprazole (NEXIUM) 40 MG capsule Take 1 capsule by mouth once a day 30 capsule 3    etonogestreL-ethinyl estradioL (NUVARING) 0.12-0.015 mg/24 hr vaginal ring Place 1 each vaginally every 28 days. 3 each 2    oxyCODONE-acetaminophen (PERCOCET)  mg per tablet Take 1 tablet by mouth every 4 (four) hours as needed for Pain. 180 tablet 0    promethazine (PHENERGAN) 25 MG tablet Take 1 tablet (25 mg total) by mouth every 6 (six) hours as needed for Nausea. 20 tablet 0     No current facility-administered medications on file prior to visit.     Family History   Problem Relation Name Age of Onset    Diabetes Mother      Stroke Mother          51 yo    Diabetes Father      Hypertension Father      GI problems Father      Hyperlipidemia Father      Diabetes Maternal Aunt      Diabetes Maternal Uncle      Diabetes Maternal Grandmother      Diabetes Maternal Grandfather      Cancer Paternal Grandmother          bladder    Bladder Cancer Paternal Grandmother      Colon cancer Paternal Grandfather      Breast cancer Neg Hx      Heart disease Neg Hx      Ovarian cancer Neg Hx      Cervical cancer Neg Hx      Vaginal cancer Neg Hx      Endometrial cancer Neg Hx         Medications Ordered                [facility] [place] - [address]   [address] [place] [address]    Telephone: [phone]   Fax: [phone]    Hours: Not open 24 hours                         E-Prescribed (2 of 2)              fluconazole (DIFLUCAN) 150 MG Tab    Sig: Take 1 tablet (150 mg total) by mouth every 72 hours as needed (for vaginal yeast infection).       Start: 1/4/25     Quantity: 2 tablet Refills: 0                         nitrofurantoin, macrocrystal-monohydrate, (MACROBID) 100 MG capsule    Sig: Take 1 capsule (100 mg total) by mouth 2 (two) times daily. for 5 days       Start: 1/4/25     Quantity: 10 capsule Refills: 0                           Ohs Peq Odvv Intake    1/4/2025  7:33 AM CST - Filed by Patient   What is your current physical address in the event of a medical emergency? [address] [place] [address]   Are you able to take your vital signs? No   Please attach any relevant images or files    Is your employer contracted with [facility]? No         Pt presents with odor in urine, urgency, frequecy, lower abdominal discomfort at end of stream, with associated low grade temp of 100.0 X 1 week. Noted to have been taking Tylenol and pyridium, not helping. Denies any blood in urine, back pain, ha, CP, SOB.         Constitution: Positive for fever.   Gastrointestinal:  Positive for abdominal pain.   Genitourinary:  Positive for dysuria, frequency and urgency. Negative for flank pain and hematuria.   Neurological:  Negative for dizziness and headaches.        Objective:   The physical exam was conducted virtually.  Physical Exam   Constitutional: She is oriented to person, place, and time. No distress.   HENT:   Head: Normocephalic.   Ears:   Right Ear: External ear normal.   Left Ear: External ear normal.   Neck: Neck supple.   Pulmonary/Chest: Effort normal. No respiratory distress.   Neurological: She is alert and oriented to person, place, and time.   Psychiatric: Mood normal.       Assessment:     1. Acute cystitis without hematuria        Plan:       Acute cystitis without hematuria  -     nitrofurantoin,  macrocrystal-monohydrate, (MACROBID) 100 MG capsule; Take 1 capsule (100 mg total) by mouth 2 (two) times daily. for 5 days  Dispense: 10 capsule; Refill: 0  -     fluconazole (DIFLUCAN) 150 MG Tab; Take 1 tablet (150 mg total) by mouth every 72 hours as needed (for vaginal yeast infection).  Dispense: 2 tablet; Refill: 0      We appreciate you trusting us with your medical care. We hope you feel better soon. We will be happy to take care of you for all of your future medical needs.     You must understand that you've received Virtual treatment only and that you may be released before all your medical problems are known or treated. You, the patient, will arrange for follow up care as instructed.     Follow up with your PCP or specialty clinic as directed in the next 3-5 days if not improved or as needed. You can call [phone] to schedule an appointment with the appropriate provider.     If your condition worsens we recommend that you receive another evaluation in person, with your primary care provider, urgent care or at the emergency room immediately or contact your primary medical clinics after hours call service to discuss your concerns.

## 2025-01-12 DIAGNOSIS — M54.50 CHRONIC BILATERAL LOW BACK PAIN WITHOUT SCIATICA: ICD-10-CM

## 2025-01-12 DIAGNOSIS — G89.29 CHRONIC BILATERAL LOW BACK PAIN WITHOUT SCIATICA: ICD-10-CM

## 2025-01-13 RX ORDER — OXYCODONE AND ACETAMINOPHEN 10; 325 MG/1; MG/1
1 TABLET ORAL EVERY 4 HOURS PRN
Qty: 180 TABLET | Refills: 0 | Status: SHIPPED | OUTPATIENT
Start: 2025-01-17 | End: 2025-02-16

## 2025-01-28 ENCOUNTER — ON-DEMAND VIRTUAL (OUTPATIENT)
Dept: URGENT CARE | Facility: CLINIC | Age: 35
End: 2025-01-28
Payer: MEDICARE

## 2025-01-28 DIAGNOSIS — B37.31 VAGINAL CANDIDA: Primary | ICD-10-CM

## 2025-01-28 DIAGNOSIS — B37.0 ORAL CANDIDA: ICD-10-CM

## 2025-01-28 PROCEDURE — 98005 SYNCH AUDIO-VIDEO EST LOW 20: CPT | Mod: 95,,, | Performed by: NURSE PRACTITIONER

## 2025-01-28 RX ORDER — FLUCONAZOLE 150 MG/1
150 TABLET ORAL DAILY
Qty: 7 TABLET | Refills: 0 | Status: SHIPPED | OUTPATIENT
Start: 2025-01-28 | End: 2025-02-04

## 2025-01-28 NOTE — PROGRESS NOTES
Subjective:      Patient ID: Kendra Russell is a 34 y.o. female.    Vitals:  vitals were not taken for this visit.     Chief Complaint: Vaginitis (Has vaginal yeast infection after taking course of abx; also has oral thrush)      Visit Type: TELE AUDIOVISUAL    Past Medical History:   Diagnosis Date    FHx: bowel obstruction     HA (headache)     Interstitial cystitis     Ovarian cyst     Ulcer      Past Surgical History:   Procedure Laterality Date    BACK SURGERY  2010    had the scar from this surgery fixed recently    CHOLECYSTECTOMY N/A 05/2015    COLON SURGERY  2012    bowel obstruction    MINI-LAPAROTOMY      NOSE SURGERY      RHINOPLASTY TIP      tracheal polyp removed      UPPER GASTROINTESTINAL ENDOSCOPY  09/2017     Review of patient's allergies indicates:   Allergen Reactions    Bismuth subcit [id number] Nausea And Vomiting, Other (See Comments) and Shortness Of Breath     Pt reports dizziness with Pylera    Other Reaction(s): Not available, Other    Pt reports dizziness with Pylera   Pt reports dizziness with Pylera    Ciprofloxacin Rash     Other Reaction(s): Other (See Comments)    Muscle and joint pain (severe).  Very painful x 1 month    Sulfamethoxazole-trimethoprim Rash     Fever, joint muscle pain-was hospilized    Other Reaction(s): Other (See Comments)    muscle and joint pains for few weeks    6-iodomethylnorcholesterol i-131 Other (See Comments)    Adhesive tape-silicones     Ciprofloxacin hcl     Escitalopram     Flagyl [metronidazole hcl]     Hydrocodone-acetaminophen      Bad migraines; dizzy, nausea    Ketamine     Metoclopramide      Other reaction(s): Rash, Unspecified    Peanut     Scopolamine      Other reaction(s): Visual alteration (finding)    Toradol [ketorolac]     Zofran [ondansetron hcl (pf)] Other (See Comments)     Pt reports migraine with Zofran    Adhesive Rash     Plastic tape    Compazine [prochlorperazine] Rash    Fosfomycin tromethamine Rash    Gabapentin Nausea  And Vomiting     Other Reaction(s): Not available    Meperidine Nausea And Vomiting     Other Reaction(s): Not available    Moxifloxacin Nausea And Vomiting     Other Reaction(s): Not available    Tramadol Nausea And Vomiting     Other Reaction(s): Not available     Current Outpatient Medications on File Prior to Visit   Medication Sig Dispense Refill    cyanocobalamin, vitamin B-12, (VITAMIN B-12) 5,000 mcg/mL Drop Take by mouth once daily.       esomeprazole (NEXIUM) 40 MG capsule Take 1 tablet by mouth once daily 30 capsule 2    esomeprazole (NEXIUM) 40 MG capsule Take 1 capsule by mouth once a day 30 capsule 3    etonogestreL-ethinyl estradioL (NUVARING) 0.12-0.015 mg/24 hr vaginal ring Place 1 each vaginally every 28 days. 3 each 2    oxyCODONE-acetaminophen (PERCOCET)  mg per tablet Take 1 tablet by mouth every 4 (four) hours as needed for Pain. 180 tablet 0    promethazine (PHENERGAN) 25 MG tablet Take 1 tablet (25 mg total) by mouth every 6 (six) hours as needed for Nausea. 20 tablet 0    [DISCONTINUED] fluconazole (DIFLUCAN) 150 MG Tab Take 1 tablet (150 mg total) by mouth every 72 hours as needed (for vaginal yeast infection). 2 tablet 0     No current facility-administered medications on file prior to visit.     Family History   Problem Relation Name Age of Onset    Diabetes Mother      Stroke Mother          53 yo    Diabetes Father      Hypertension Father      GI problems Father      Hyperlipidemia Father      Diabetes Maternal Aunt      Diabetes Maternal Uncle      Diabetes Maternal Grandmother      Diabetes Maternal Grandfather      Cancer Paternal Grandmother          bladder    Bladder Cancer Paternal Grandmother      Colon cancer Paternal Grandfather      Breast cancer Neg Hx      Heart disease Neg Hx      Ovarian cancer Neg Hx      Cervical cancer Neg Hx      Vaginal cancer Neg Hx      Endometrial cancer Neg Hx         Medications Ordered                [facility] [place]  [address]   [address] [place] [address]    Telephone: [phone]   Fax: [phone]   Hours: Not open 24 hours                         E-Prescribed (1 of 1)              fluconazole (DIFLUCAN) 150 MG Tab    Sig: Take 1 tablet (150 mg total) by mouth once daily. for 7 days       Start: 1/28/25     Quantity: 7 tablet Refills: 0                           Ohs Peq Odvv Intake    1/28/2025  4:16 PM CST - Filed by Patient   What is your current physical address in the event of a medical emergency? [address] [place] [address]   Are you able to take your vital signs? No   Please attach any relevant images or files    Is your employer contracted with [facility]? No         HPI     Vaginitis     Additional comments: Has vaginal yeast infection after taking course of abx; also has oral thrush  Two patient identifiers were used-name was repeated verbally as well as date of birth.  The patient was located in their home in the [facility].          HENT:  Positive for tongue pain.    Genitourinary:  Positive for vaginal discharge.        Objective:   The physical exam was conducted virtually.  Physical Exam   Constitutional: She is oriented to person, place, and time. No distress.   HENT:   Head: Normocephalic and atraumatic.   Neck: Neck supple.   Pulmonary/Chest: Effort normal. No respiratory distress.   Abdominal: Normal appearance.   Musculoskeletal: Normal range of motion.         General: Normal range of motion.   Neurological: no focal deficit. She is alert and oriented to person, place, and time.   Skin: Skin is not pale.   Psychiatric: Her behavior is normal. Mood, judgment and thought content normal.       Assessment:     1. Vaginal candida    2. Oral candida        Plan:       Vaginal candida    Oral candida    Other orders  -     fluconazole (DIFLUCAN) 150 MG Tab; Take 1 tablet (150 mg total) by mouth once daily. for 7 days  Dispense: 7 tablet; Refill: 0    Meds as  directed above.  F/u in clinic if symptoms fail to resolve.    You must understand that you've received a virtual Care treatment only and that you may be released before all your medical problems are known or treated. You, the patient, will arrange for follow up care as instructed.  If your condition worsens we recommend that you receive another evaluation at an urgent care in person, the emergency room or contact your primary medical clinics after hours call service to discuss your concerns.              Present with the patient at the time of consultation: TELEMED PRESENT WITH PATIENT: None

## 2025-02-12 DIAGNOSIS — G89.29 CHRONIC BILATERAL LOW BACK PAIN WITHOUT SCIATICA: ICD-10-CM

## 2025-02-12 DIAGNOSIS — M54.50 CHRONIC BILATERAL LOW BACK PAIN WITHOUT SCIATICA: ICD-10-CM

## 2025-02-12 RX ORDER — OXYCODONE AND ACETAMINOPHEN 10; 325 MG/1; MG/1
1 TABLET ORAL EVERY 4 HOURS PRN
Qty: 180 TABLET | Refills: 0 | Status: SHIPPED | OUTPATIENT
Start: 2025-02-16 | End: 2025-03-18

## 2025-02-21 DIAGNOSIS — Z00.00 ENCOUNTER FOR MEDICARE ANNUAL WELLNESS EXAM: ICD-10-CM

## 2025-02-25 NOTE — ADDENDUM NOTE
Addended by: CHAPARRITA URRUTIA on: 7/26/2024 09:50 AM     Modules accepted: Orders    
Abdominal Pain, N/V/D

## 2025-03-04 PROCEDURE — 98005 SYNCH AUDIO-VIDEO EST LOW 20: CPT | Mod: 95,,, | Performed by: NURSE PRACTITIONER

## 2025-03-14 DIAGNOSIS — G89.29 CHRONIC BILATERAL LOW BACK PAIN WITHOUT SCIATICA: ICD-10-CM

## 2025-03-14 DIAGNOSIS — M54.50 CHRONIC BILATERAL LOW BACK PAIN WITHOUT SCIATICA: ICD-10-CM

## 2025-03-14 RX ORDER — OXYCODONE AND ACETAMINOPHEN 10; 325 MG/1; MG/1
1 TABLET ORAL EVERY 4 HOURS PRN
Qty: 180 TABLET | Refills: 0 | Status: SHIPPED | OUTPATIENT
Start: 2025-03-18 | End: 2025-04-17

## 2025-03-20 ENCOUNTER — TELEPHONE (OUTPATIENT)
Dept: PHYSICAL MEDICINE AND REHAB | Facility: CLINIC | Age: 35
End: 2025-03-20
Payer: MEDICARE

## 2025-04-10 ENCOUNTER — TELEPHONE (OUTPATIENT)
Dept: OBSTETRICS AND GYNECOLOGY | Facility: CLINIC | Age: 35
End: 2025-04-10

## 2025-04-10 NOTE — TELEPHONE ENCOUNTER
Attempted to contact pt regarding her phone call to the clinic. I left a voicemail for the pt to return the phone call.

## 2025-04-11 RX ORDER — ETONOGESTREL AND ETHINYL ESTRADIOL VAGINAL RING .015; .12 MG/D; MG/D
1 RING VAGINAL
Qty: 3 EACH | Refills: 0 | Status: SHIPPED | OUTPATIENT
Start: 2025-04-11

## 2025-04-11 NOTE — TELEPHONE ENCOUNTER
Spoke with pharmacist patient is requesting 90-day supply.    Medication:  etonogestreL-ethinyl estradioL (NUVARING) 0.12-0.015 mg/24 hr vaginal ring     Pharmacy:  [id number] [place]      Appointment:  05/29- Annual exam

## 2025-04-11 NOTE — TELEPHONE ENCOUNTER
----- Message from Ibis sent at 4/11/2025 11:13 AM CDT -----  Type: RX Refill Request Who Called: [facility]: Refill RX Name and Strength: etonogestreL-ethinyl estradioL (NUVARING) 0.12-0.015 mg/24 hr vaginal ringPreferred Pharmacy with phone number: [facility] [id number] [facility] [place] [address] [place] [address]: [phone] Fax: [phone] or Mail Order:local Would the patient rather a call back or a response via [facility]? [facility] Number: Telephone Information:Mobile          [phone] Information: Pt is requesting a 90 day supply.Thank you.   CYSTOSCOPY    ACTIVITY   · As tolerated and as directed by your doctor. · Bathe or shower as directed by your doctor. DIET  · Clear liquids until no nausea or vomiting; then light diet for the first day. · Drink plenty of liquids. At least 8 glasses of water to help flush out bladder. Limit amount of caffeine. · Advance to regular diet on second day, unless your doctor orders otherwise. · If nausea and vomiting continues, call your doctor. PAIN  · Take pain medication as directed by your doctor. · Call your doctor if pain is NOT relieved by medication. MAY RESUME ELOQUIS IN 2 OR 3 DAYS    CALL YOUR DOCTOR IF  · Expect blood-tinged urine. Call your doctor if it lasts more than 72 hours or if you cannot see through the urine. · Temperature of 101 degrees or above. · Unable to empty bladder. AFTER ANESTHESIA  · For the next 24 hours: DO NOT Drive, drink alcoholic beverages, or Make important decisions. · Be aware of dizziness following anesthesia and while taking pain medications    APPOINTMENT DATE/ TIME Thursday January 2 at 9:00    [address] PHONE NUMBER  [phone]      DISCHARGE SUMMARY from Nurse    PATIENT INSTRUCTIONS:    After general anesthesia or intravenous sedation, for 24 hours or while taking prescription Narcotics:  · Limit your activities  · Do not drive and operate hazardous machinery  · Do not make important personal or business decisions  · Do  not drink alcoholic beverages  · If you have not urinated within 8 hours after discharge, please contact your surgeon on call. *  Please give a list of your current medications to your Primary Care Provider. *  Please update this list whenever your medications are discontinued, doses are      changed, or new medications (including over-the-counter products) are added. *  Please carry medication information at all times in case of emergency situations.       These are general instructions for a healthy lifestyle:    No smoking/ No tobacco products/ Avoid exposure to second hand smoke    Surgeon General's Warning:  Quitting smoking now greatly reduces serious risk to your health. Obesity, smoking, and sedentary lifestyle greatly increases your risk for illness    A healthy diet, regular physical exercise & weight monitoring are important for maintaining a healthy lifestyle    You may be retaining fluid if you have a history of heart failure or if you experience any of the following symptoms:  Weight gain of 3 pounds or more overnight or 5 pounds in a week, increased swelling in our hands or feet or shortness of breath while lying flat in bed. Please call your doctor as soon as you notice any of these symptoms; do not wait until your next office visit. Recognize signs and symptoms of STROKE:    F-face looks uneven    A-arms unable to move or move unevenly    S-speech slurred or non-existent    T-time-call 911 as soon as signs and symptoms begin-DO NOT go       Back to bed or wait to see if you get better-TIME IS BRAIN.

## 2025-04-14 DIAGNOSIS — G89.29 CHRONIC BILATERAL LOW BACK PAIN WITHOUT SCIATICA: ICD-10-CM

## 2025-04-14 DIAGNOSIS — M54.50 CHRONIC BILATERAL LOW BACK PAIN WITHOUT SCIATICA: ICD-10-CM

## 2025-04-14 RX ORDER — OXYCODONE AND ACETAMINOPHEN 10; 325 MG/1; MG/1
1 TABLET ORAL EVERY 4 HOURS PRN
Qty: 180 TABLET | Refills: 0 | Status: SHIPPED | OUTPATIENT
Start: 2025-04-17 | End: 2025-05-18

## 2025-05-13 DIAGNOSIS — M54.50 CHRONIC BILATERAL LOW BACK PAIN WITHOUT SCIATICA: ICD-10-CM

## 2025-05-13 DIAGNOSIS — G89.29 CHRONIC BILATERAL LOW BACK PAIN WITHOUT SCIATICA: ICD-10-CM

## 2025-05-13 RX ORDER — OXYCODONE AND ACETAMINOPHEN 10; 325 MG/1; MG/1
1 TABLET ORAL EVERY 4 HOURS PRN
Qty: 180 TABLET | Refills: 0 | Status: SHIPPED | OUTPATIENT
Start: 2025-05-18 | End: 2025-06-17

## 2025-05-13 RX ORDER — OXYCODONE AND ACETAMINOPHEN 10; 325 MG/1; MG/1
1 TABLET ORAL EVERY 4 HOURS PRN
Qty: 180 TABLET | Refills: 0 | Status: CANCELLED | OUTPATIENT
Start: 2025-05-13 | End: 2025-06-12

## 2025-05-13 NOTE — TELEPHONE ENCOUNTER
Last ordered:04/14/2025     Last seen:06/02/2023  Upcoming appt:05/16/2025     (2) more than 100 beats/min

## 2025-05-14 ENCOUNTER — ON-DEMAND VIRTUAL (OUTPATIENT)
Dept: URGENT CARE | Facility: CLINIC | Age: 35
End: 2025-05-14
Payer: MEDICARE

## 2025-05-14 DIAGNOSIS — B37.9 ANTIBIOTIC-INDUCED YEAST INFECTION: Primary | ICD-10-CM

## 2025-05-14 DIAGNOSIS — T36.95XA ANTIBIOTIC-INDUCED YEAST INFECTION: Primary | ICD-10-CM

## 2025-05-14 DIAGNOSIS — N30.00 ACUTE CYSTITIS WITHOUT HEMATURIA: ICD-10-CM

## 2025-05-14 RX ORDER — NITROFURANTOIN 25; 75 MG/1; MG/1
100 CAPSULE ORAL 2 TIMES DAILY
Qty: 14 CAPSULE | Refills: 0 | Status: SHIPPED | OUTPATIENT
Start: 2025-05-14 | End: 2025-05-21

## 2025-05-14 RX ORDER — FLUCONAZOLE 150 MG/1
150 TABLET ORAL DAILY
Qty: 2 TABLET | Refills: 0 | Status: SHIPPED | OUTPATIENT
Start: 2025-05-14 | End: 2025-05-16

## 2025-05-14 NOTE — PROGRESS NOTES
Subjective:      Patient ID: Kendra Russell is a 34 y.o. female.    Vitals:  vitals were not taken for this visit.     Chief Complaint: Urinary Tract Infection      Visit Type: TELE AUDIOVISUAL - This visit was conducted virtually based on  subjective information and limited objective exam    Present with the patient at the time of consultation: TELEMED PRESENT WITH PATIENT: None  LOCATION OF PATIENT [place]  Two patient identifiers used to verify patient- saying out date of birth and full name.       Past Medical History:   Diagnosis Date    FHx: bowel obstruction     HA (headache)     Interstitial cystitis     Ovarian cyst     Ulcer      Past Surgical History:   Procedure Laterality Date    BACK SURGERY  2010    had the scar from this surgery fixed recently    CHOLECYSTECTOMY N/A 05/2015    COLON SURGERY  2012    bowel obstruction    MINI-LAPAROTOMY      NOSE SURGERY      RHINOPLASTY TIP      tracheal polyp removed      UPPER GASTROINTESTINAL ENDOSCOPY  09/2017     Review of patient's allergies indicates:   Allergen Reactions    Bismuth subcit [id number] Nausea And Vomiting, Other (See Comments) and Shortness Of Breath     Pt reports dizziness with Pylera    Other Reaction(s): Not available, Other    Pt reports dizziness with Pylera   Pt reports dizziness with Pylera    Ciprofloxacin Rash     Other Reaction(s): Other (See Comments)    Muscle and joint pain (severe).  Very painful x 1 month    Sulfamethoxazole-trimethoprim Rash     Fever, joint muscle pain-was hospilized    Other Reaction(s): Other (See Comments)    muscle and joint pains for few weeks    6-iodomethylnorcholesterol i-131 Other (See Comments)    Adhesive tape-silicones     Ciprofloxacin hcl     Escitalopram     Flagyl [metronidazole hcl]     Hydrocodone-acetaminophen      Bad migraines; dizzy, nausea    Ketamine     Metoclopramide      Other reaction(s): Rash, Unspecified    Peanut     Scopolamine      Other reaction(s): Visual alteration  (finding)    Toradol [ketorolac]     Zofran [ondansetron hcl (pf)] Other (See Comments)     Pt reports migraine with Zofran    Adhesive Rash     Plastic tape    Compazine [prochlorperazine] Rash    Fosfomycin tromethamine Rash    Gabapentin Nausea And Vomiting     Other Reaction(s): Not available    Meperidine Nausea And Vomiting     Other Reaction(s): Not available    Moxifloxacin Nausea And Vomiting     Other Reaction(s): Not available    Tramadol Nausea And Vomiting     Other Reaction(s): Not available     Medications Ordered Prior to Encounter[1]  Family History   Problem Relation Name Age of Onset    Diabetes Mother      Stroke Mother          53 yo    Diabetes Father      Hypertension Father      GI problems Father      Hyperlipidemia Father      Diabetes Maternal Aunt      Diabetes Maternal Uncle      Diabetes Maternal Grandmother      Diabetes Maternal Grandfather      Cancer Paternal Grandmother          bladder    Bladder Cancer Paternal Grandmother      Colon cancer Paternal Grandfather      Breast cancer Neg Hx      Heart disease Neg Hx      Ovarian cancer Neg Hx      Cervical cancer Neg Hx      Vaginal cancer Neg Hx      Endometrial cancer Neg Hx         Medications Ordered                [id number] [place] - [address]   [address] [place] [address]    Telephone: [phone]   Fax: [phone]   Hours: Not open 24 hours                         E-Prescribed (2 of 2)              fluconazole (DIFLUCAN) 150 MG Tab    Sig: Take 1 tablet (150 mg total) by mouth once daily. for 2 doses       Start: 5/14/25     Quantity: 2 tablet Refills: 0                         nitrofurantoin, macrocrystal-monohydrate, (MACROBID) 100 MG capsule    Sig: Take 1 capsule (100 mg total) by mouth 2 (two) times daily. for 7 days       Start: 5/14/25     Quantity: 14 capsule Refills: 0                           Ohs Peq Odvv Intake    5/14/2025  3:08 PM CDT - Filed by Patient   What is your  current physical address in the event of a medical emergency? [address] [place] [address]   Are you able to take your vital signs? No   Please attach any relevant images or files    Is your employer contracted with [facility]? No         33 yo female with interstitial cystitis c/o cloudy urine with odor, frequency, urgency, dysuria for the past 5 days.   Has pyridium that helped with pain.   Has seen a urologist for the past 10 years but retiring, waiting on another urologist.   Has an appt with gyn on 5/29, reports having some hormonal changes.   Reports wanting diflucan due to hx of yeast infection with abx use.   Denies hematuria, fever, chills, N/V, abd/pelvic pain, flank pain, vaginal discharge.     Urinary Tract Infection   Associated symptoms include frequency and urgency. Pertinent negatives include no chills, flank pain, nausea, vomiting or constipation.       Constitution: Negative for chills and fever.   Gastrointestinal:  Negative for abdominal pain, nausea, vomiting, constipation and diarrhea.   Genitourinary:  Positive for dysuria, frequency, urgency and vaginal odor. Negative for urine decreased, flank pain, vaginal pain, vaginal discharge, vaginal bleeding and pelvic pain.        Objective:   The physical exam was conducted virtually.    AAO x 3 ; no acute distress noted; appearance normal; mood and behavior normal; thought process normal  Head- normocephalic  Nose- appears normal, no discharge or erythema  Eyes- pupils appear normal in size, no drainage, no erythema  Ears- normal appearing; no discharge, no erythema  Mouth- appears normal  Oropharynx- no erythema, lesions  Lungs- breathing at a normal rate, no acute distress noted  Heart- no reports of tachycardia, palpitations, chest pain  Abdomen- non distended, non tender reported by patient  Skin- warm and dry, no erythema or edema noted by patient or visualized  Psych- as above; no si/hi      Assessment:     1.  Antibiotic-induced yeast infection    2. Acute cystitis without hematuria        Plan:     PLEASE READ YOUR DISCHARGE INSTRUCTIONS ENTIRELY AS IT CONTAINS IMPORTANT INFORMATION.        Take the antibiotics to completion.      Drink plenty of fluids, wipe front to back, take showers not baths, no scented soaps, wear breathable cotton underwear, urinate after sexual intercourse.      Please go to the ER for worsening symptoms including fever, worsening flank pain, vomiting, etc.         Please return or see your primary care doctor if you develop new or worsening symptoms.        Thank you for choosing [facility]!    Our goal in the [facility] is to always provide outstanding medical care. You may receive a survey by mail or e-mail in the next week regarding your experience today. We would greatly appreciate you completing and returning the survey. Your feedback provides us with a way to recognize our staff who provide very good care, and it helps us learn how to improve when your experience was below our aspiration of excellence.         We appreciate you trusting us with your medical care. We hope you feel better soon. We will be happy to take care of you for all of your future medical needs.    You must understand that you've received an Urgent Care treatment only and that you may be released before all your medical problems are known or treated. You, the patient, will arrange for follow up care as instructed.    Follow up with your PCP or specialty clinic as directed in the next 1-2 weeks if not improved or as needed.  You can call [phone] to schedule an appointment with the appropriate provider.    If your condition worsens we recommend that you receive another evaluation in person, with your primary care provider, urgent care or at the emergency room immediately or contact your primary medical clinics after hours call service to discuss your concerns.          Antibiotic-induced yeast infection  -     fluconazole (DIFLUCAN) 150 MG Tab; Take 1 tablet (150 mg total) by mouth once daily. for 2 doses  Dispense: 2 tablet; Refill: 0    Acute cystitis without hematuria  -     nitrofurantoin, macrocrystal-monohydrate, (MACROBID) 100 MG capsule; Take 1 capsule (100 mg total) by mouth 2 (two) times daily. for 7 days  Dispense: 14 capsule; Refill: 0                         [1]   Current Outpatient Medications on File Prior to Visit   Medication Sig Dispense Refill    cyanocobalamin, vitamin B-12, (VITAMIN B-12) 5,000 mcg/mL Drop Take by mouth once daily.       esomeprazole (NEXIUM) 40 MG capsule Take 1 tablet by mouth once daily 30 capsule 2    esomeprazole (NEXIUM) 40 MG capsule Take 1 capsule by mouth once a day 30 capsule 2    etonogestreL-ethinyl estradioL (NUVARING) 0.12-0.015 mg/24 hr vaginal ring Place 1 each vaginally every 28 days. 3 each 0    [START ON 5/18/2025] oxyCODONE-acetaminophen (PERCOCET)  mg per tablet Take 1 tablet by mouth every 4 (four) hours as needed for Pain. 180 tablet 0    promethazine (PHENERGAN) 25 MG tablet Take 1 tablet (25 mg total) by mouth every 6 (six) hours as needed for Nausea. 20 tablet 0     No current facility-administered medications on file prior to visit.

## 2025-06-12 DIAGNOSIS — M54.50 CHRONIC BILATERAL LOW BACK PAIN WITHOUT SCIATICA: ICD-10-CM

## 2025-06-12 DIAGNOSIS — G89.29 CHRONIC BILATERAL LOW BACK PAIN WITHOUT SCIATICA: ICD-10-CM

## 2025-06-12 RX ORDER — OXYCODONE AND ACETAMINOPHEN 10; 325 MG/1; MG/1
1 TABLET ORAL EVERY 4 HOURS PRN
Qty: 180 TABLET | Refills: 0 | Status: CANCELLED | OUTPATIENT
Start: 2025-06-12 | End: 2025-07-12

## 2025-06-12 RX ORDER — OXYCODONE AND ACETAMINOPHEN 10; 325 MG/1; MG/1
1 TABLET ORAL EVERY 4 HOURS PRN
Qty: 180 TABLET | Refills: 0 | Status: SHIPPED | OUTPATIENT
Start: 2025-06-17 | End: 2025-07-17

## 2025-07-13 DIAGNOSIS — G89.29 CHRONIC BILATERAL LOW BACK PAIN WITHOUT SCIATICA: ICD-10-CM

## 2025-07-13 DIAGNOSIS — M54.50 CHRONIC BILATERAL LOW BACK PAIN WITHOUT SCIATICA: ICD-10-CM

## 2025-07-14 RX ORDER — OXYCODONE AND ACETAMINOPHEN 10; 325 MG/1; MG/1
1 TABLET ORAL EVERY 4 HOURS PRN
Qty: 180 TABLET | Refills: 0 | Status: SHIPPED | OUTPATIENT
Start: 2025-07-17 | End: 2025-08-16

## 2025-07-27 NOTE — TELEPHONE ENCOUNTER
Refill Routing Note   Medication(s) are not appropriate for processing by [facility] for the following reason(s):        Patient not seen by provider within 15 months  Required vitals outdated    [facility] action(s):  Defer             Appointments  past 12m or future 3m with PCP    Date Provider   Last Visit   Visit date not found Antionette Preciado MD   Next Visit   9/11/2025 Antionette Preciado MD   ED visits in past 90 days: 0        Note composed:5:15 PM 07/27/2025

## 2025-07-28 RX ORDER — ETONOGESTREL AND ETHINYL ESTRADIOL .12; .015 MG/D; MG/D
RING VAGINAL
Qty: 3 EACH | Refills: 0 | Status: SHIPPED | OUTPATIENT
Start: 2025-07-28

## 2025-08-12 DIAGNOSIS — G89.29 CHRONIC BILATERAL LOW BACK PAIN WITHOUT SCIATICA: ICD-10-CM

## 2025-08-12 DIAGNOSIS — M54.50 CHRONIC BILATERAL LOW BACK PAIN WITHOUT SCIATICA: ICD-10-CM

## 2025-08-12 RX ORDER — OXYCODONE AND ACETAMINOPHEN 10; 325 MG/1; MG/1
1 TABLET ORAL EVERY 4 HOURS PRN
Qty: 180 TABLET | Refills: 0 | Status: SHIPPED | OUTPATIENT
Start: 2025-08-16 | End: 2025-09-15

## 2025-08-12 RX ORDER — OXYCODONE AND ACETAMINOPHEN 10; 325 MG/1; MG/1
1 TABLET ORAL EVERY 4 HOURS PRN
Qty: 180 TABLET | Refills: 0 | Status: CANCELLED | OUTPATIENT
Start: 2025-08-12 | End: 2025-09-11